# Patient Record
Sex: FEMALE | Race: WHITE | URBAN - METROPOLITAN AREA
[De-identification: names, ages, dates, MRNs, and addresses within clinical notes are randomized per-mention and may not be internally consistent; named-entity substitution may affect disease eponyms.]

---

## 2018-03-01 ENCOUNTER — HOSPITAL ENCOUNTER (EMERGENCY)
Facility: CLINIC | Age: 63
Discharge: HOME | End: 2018-03-01
Attending: EMERGENCY MEDICINE | Admitting: EMERGENCY MEDICINE
Payer: COMMERCIAL

## 2018-03-01 ENCOUNTER — APPOINTMENT (OUTPATIENT)
Dept: GENERAL RADIOLOGY | Facility: CLINIC | Age: 63
End: 2018-03-01
Attending: EMERGENCY MEDICINE
Payer: COMMERCIAL

## 2018-03-01 VITALS
HEART RATE: 72 BPM | OXYGEN SATURATION: 98 % | TEMPERATURE: 97 F | DIASTOLIC BLOOD PRESSURE: 90 MMHG | SYSTOLIC BLOOD PRESSURE: 132 MMHG | RESPIRATION RATE: 20 BRPM | WEIGHT: 210 LBS | BODY MASS INDEX: 34.95 KG/M2

## 2018-03-01 DIAGNOSIS — S92.514A CLOSED NONDISPLACED FRACTURE OF PROXIMAL PHALANX OF LESSER TOE OF RIGHT FOOT, INITIAL ENCOUNTER: ICD-10-CM

## 2018-03-01 PROCEDURE — 28470 CLTX METATARSAL FX WO MNP EA: CPT | Mod: RT

## 2018-03-01 PROCEDURE — 99284 EMERGENCY DEPT VISIT MOD MDM: CPT | Mod: 25

## 2018-03-01 PROCEDURE — 73660 X-RAY EXAM OF TOE(S): CPT | Mod: RT

## 2018-03-01 PROCEDURE — 25000132 ZZH RX MED GY IP 250 OP 250 PS 637: Performed by: EMERGENCY MEDICINE

## 2018-03-01 RX ORDER — ACETAMINOPHEN 500 MG
1000 TABLET ORAL EVERY 4 HOURS PRN
Status: DISCONTINUED | OUTPATIENT
Start: 2018-03-01 | End: 2018-03-01 | Stop reason: HOSPADM

## 2018-03-01 RX ADMIN — ACETAMINOPHEN 1000 MG: 500 TABLET, FILM COATED ORAL at 06:20

## 2018-03-01 ASSESSMENT — ENCOUNTER SYMPTOMS
JOINT SWELLING: 1
ARTHRALGIAS: 1

## 2018-03-01 NOTE — DISCHARGE INSTRUCTIONS
Closed Toe Fracture  Your toe is broken (fractured). This causes local pain, swelling, and sometimes bruising. This injury usually takes about 4 to 6 weeks to heal, but can sometimes take longer. Toe injuries are often treated by taping the injured toe to the next one (buddy taping). This protects the injured toe and holds it in position.     If the toenail has been severely injured, it may fall off in 1 to 2 weeks. It takes up to 12 months for a new toenail to grow back.  Home care  Follow these guidelines when caring for yourself at home:    You may be given a cast shoe to wear to keep your toe from moving. If not, you can use a sandal or any shoe that doesn t put pressure on the injured toe until the swelling and pain go away. If using a sandal, be careful not to strike your foot against anything. Another injury could make the fracture worse. If you were given crutches, don t put full weight on the injured foot until you can do so without pain, or as directed by your healthcare provider.    Keep your foot elevated to reduce pain and swelling. When sleeping, put a pillow under the injured leg. When sitting, support the injured leg so it is above your waist. This is very important during the first 2 days (48 hours).    Put an ice pack on the injured area. Do this for 20 minutes every 1 to 2 hours the first day for pain relief. You can make an ice pack by wrapping a plastic bag of ice cubes in a thin towel. As the ice melts, be careful that any cloth or paper tape doesn t get wet. Continue using the ice pack 3 to 4 times a day for the next 2 days. Then use the ice pack as needed to ease pain and swelling.    If buddy tape was used and it becomes wet or dirty, change it. You may replace it with paper, plastic, or cloth tape. Cloth tape and paper tapes must be kept dry.    You may use acetaminophen or ibuprofen to control pain, unless another pain medicine was prescribed. If you have chronic liver or kidney disease,  talk with your healthcare provider before using these medicines. Also talk with your provider if you ve had a stomach ulcer or gastrointestinal bleeding.    You may return to sports or physical education activities after 4 weeks when you can run without pain, or as directed by your healthcare provider.  Follow-up care  Follow up with your healthcare provider in 1 week, or as advised. This is to make sure the bone is healing the way it should.  X-rays may be taken. You will be told of any new findings that may affect your care.  When to seek medical advice  Call your healthcare provider right away if any of these occur:    Pain or swelling gets worse    The cast/splint cracks    The cast and padding get wet and stays wet more than 24 hours    Bad odor from the cast/splint or wound fluid stains the cast    Tightness or pressure under the cast/splint gets worse    Toe becomes cold, blue, numb, or tingly    You can t move the toe    Signs of infection: fever, redness, warmth, swelling, or drainage from the wound or cast    Fever of 100.4 F (38 C) or higher, or as directed by your healthcare provider  Date Last Reviewed: 2/1/2017 2000-2017 [employer]. [address] [place] [address]. All rights reserved. This information is not intended as a substitute for professional medical care. Always follow your healthcare professional's instructions.

## 2018-03-01 NOTE — ED PROVIDER NOTES
History     Chief Complaint:  Toe Injury    The history is provided by the patient.      Jessie Mccollum is a 62 year old female who presents with a toe injury. Patient stubbed her right little toe on the bath tub last night. 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 Currently rates pain at 9/10 in severity. She denies numbness in the toe, other injury or other complaint.     Allergies:  Perfume  Sulfa Drugs     Medications:    Robaxin  Xopenex neb  Benzonatate   Zyrtec    Past Medical History:    Allergy  Asthma     Past Surgical History:    Back surgery  Biopsy   Cholecystectomy   Gyn surgery   Orthopedic surgery   Soft tissue surgery     Family History:    History reviewed. No pertinent family history.      Social History:  Presents alone   Occupation:   Tobacco use: Never  Alcohol use: Denies   PCP: [facility]    Marital Status:       Review of Systems   Musculoskeletal: Positive for arthralgias and joint swelling.   All other systems reviewed and are negative.    Physical Exam     Patient Vitals for the past 24 hrs:   BP Temp Temp src Heart Rate Resp SpO2 Weight   03/01/18 0538 (!) 138/115 97  F (36.1  C) Temporal 76 18 97 % 95.3 kg (210 lb)      Physical Exam  Constitutional:  Appears well-developed and well-nourished. Alert. Conversant. Non toxic.       HENT:   Head: Atraumatic.   Nose: Nose unremarkable   Mouth/Throat: Oropharynx is clear and moist.   Eyes: Conjunctivae normal. EOM are grossly normal. Pupils are equal, round, and reactive to light. No scleral icterus.   Neck: Normal range of motion. No tracheal deviation present.   Cardiovascular: Normal rate, regular rhythm. Symmetric DP artery pulses.  Pulmonary/Chest: Effort normal. No stridor. No respiratory distress.  Musculoskeletal: Unremarkable except for right little toe. No other  abnormality noted.   Swelling, and ecchymosis of the little toe of the right foot.  No crepitus.  No rotational deformity.  Wiggling and range of motion are limited by pain.  She has intact digital nerve sensory function in that toe.  No tenderness more proximally in the metatarsal, or in the midfoot, hindfoot, or ankle.  Other toes are normal  Neurological: Alert and oriented to person, place, and time. Normal strength. CN II-VII intact. No sensory deficit. GCS eye subscore is 4. GCS verbal subscore is 5. GCS motor subscore is 6. Normal coordination . Intact distal sensory and motor function.  Skin: Skin is warm and dry. No rash noted. No pallor. Normal capillary refill.  Psychiatric:  normal mood and affect.      Emergency Department Course   Imaging:  Radiographic findings were communicated with the patient who voiced understanding of the findings.    XR Toe right, 2 views:  IMPRESSION: Minimally displaced fracture involving the mid to distal  aspect of the proximal phalanx of the fifth toe. No other fractures.  Slight degenerative changes at the first MTP joint.    Imaging independently reviewed and agree with radiologist interpretation.       Interventions:  0620: Tylenol 1000 mg PO      Emergency Department Course:  Past medical records, nursing notes, and vitals reviewed.  0600: I performed an exam of the patient and obtained history, as documented above.   Above interventions provided.  The patient was sent for a XR while in the emergency department, findings above.   0630: I rechecked the patient. Findings and plan explained to the Patient. Patient's toes rickie taped and she is placed in walking shoe.   Patient discharged home with instructions regarding supportive care, medications, and reasons to return. The importance of close follow-up was reviewed.      Impression & Plan    Medical Decision Making:  Jessie Mccollum is a 62 year old female who presents with toe pain after trauma.  XR reveals fx  as noted above.  Sensation intact, cap refill intact, no need for reduction as bones are in adequate alignment. No rotational deformity. No tarsal-metatarsal pain or evidence of fx.  No significant edema or erythema.  Analgesia was provided and toe was rickie-taped and walking shoe was also given.  Instructions to f/u with PMD in 1 week.  Elevation and ice x 48 hours.    Diagnosis:    ICD-10-CM    1. Closed nondisplaced fracture of proximal phalanx of lesser toe of right foot, initial encounter S92.514A     little toe, right foot       Disposition:  Discharged to home with plan as outlined.    Discharge Medications:  New Prescriptions    No medications on file         I, Micah Sellers am serving as a scribe at 6:07 AM on 3/1/2018 to document services personally performed by Ellis Morley MD based on my observations and the provider's statements to me.    3/1/2018   [facility] EMERGENCY DEPARTMENT       Ellis Morley MD  03/02/18 0515

## 2018-03-01 NOTE — ED AVS SNAPSHOT
[facility] Emergency Department    [address]    [facility] [address]    Phone:  [phone]    Fax:  [phone]                                       Jessie Mccollum   MRN: [id number]    Department:  [facility] Emergency Department   Date of Visit:  3/1/2018           Patient Information     Date Of Birth          1955        Your diagnoses for this visit were:     Closed nondisplaced fracture of proximal phalanx of lesser toe of right foot, initial encounter little toe, right foot       You were seen by Ellis Morley MD.      Follow-up Information     Follow up with [facility] In 7 days.    Why:  As needed    Contact information:    [address], [facility] [address]  [facility] [address]  [phone]          Discharge Instructions         Closed Toe Fracture  Your toe is broken (fractured). This causes local pain, swelling, and sometimes bruising. This injury usually takes about 4 to 6 weeks to heal, but can sometimes take longer. Toe injuries are often treated by taping the injured toe to the next one (buddy taping). This protects the injured toe and holds it in position.     If the toenail has been severely injured, it may fall off in 1 to 2 weeks. It takes up to 12 months for a new toenail to grow back.  Home care  Follow these guidelines when caring for yourself at home:    You may be given a cast shoe to wear to keep your toe from moving. If not, you can use a sandal or any shoe that doesn t put pressure on the injured toe until the swelling and pain go away. If using a sandal, be careful not to strike your foot against anything. Another injury could make the fracture worse. If you were given crutches, don t put full weight on the injured foot until you can do so without pain, or as directed by your healthcare provider.    Keep your foot elevated to reduce pain and swelling. When sleeping, put a pillow under the injured  leg. When sitting, support the injured leg so it is above your waist. This is very important during the first 2 days (48 hours).    Put an ice pack on the injured area. Do this for 20 minutes every 1 to 2 hours the first day for pain relief. You can make an ice pack by wrapping a plastic bag of ice cubes in a thin towel. As the ice melts, be careful that any cloth or paper tape doesn t get wet. Continue using the ice pack 3 to 4 times a day for the next 2 days. Then use the ice pack as needed to ease pain and swelling.    If buddy tape was used and it becomes wet or dirty, change it. You may replace it with paper, plastic, or cloth tape. Cloth tape and paper tapes must be kept dry.    You may use acetaminophen or ibuprofen to control pain, unless another pain medicine was prescribed. If you have chronic liver or kidney disease, talk with your healthcare provider before using these medicines. Also talk with your provider if you ve had a stomach ulcer or gastrointestinal bleeding.    You may return to sports or physical education activities after 4 weeks when you can run without pain, or as directed by your healthcare provider.  Follow-up care  Follow up with your healthcare provider in 1 week, or as advised. This is to make sure the bone is healing the way it should.  X-rays may be taken. You will be told of any new findings that may affect your care.  When to seek medical advice  Call your healthcare provider right away if any of these occur:    Pain or swelling gets worse    The cast/splint cracks    The cast and padding get wet and stays wet more than 24 hours    Bad odor from the cast/splint or wound fluid stains the cast    Tightness or pressure under the cast/splint gets worse    Toe becomes cold, blue, numb, or tingly    You can t move the toe    Signs of infection: fever, redness, warmth, swelling, or drainage from the wound or cast    Fever of 100.4 F (38 C) or higher, or as directed by your healthcare  provider  Date Last Reviewed: 2/1/2017 2000-2017 [employer]. [address] [place] [address], [place] [address]. All rights reserved. This information is not intended as a substitute for professional medical care. Always follow your healthcare professional's instructions.          24 Hour Appointment Hotline       To make an appointment at any [facility], call [phone] ([phone]). If you don't have a family doctor or clinic, we will help you find one. [facility] are conveniently located to serve the needs of you and your family.             Review of your medicines      Our records show that you are taking the medicines listed below. If these are incorrect, please call your family doctor or clinic.        Dose / Directions Last dose taken    BENADRYL PO        Refills:  0        BENZONATATE PO        Refills:  0        betamethasone dipropionate 0.05 % cream   Commonly known as:  DIPROSONE        Apply topically 2 times daily   Refills:  0        cetirizine 10 MG tablet   Commonly known as:  zyrTEC   Dose:  10 mg        Take 10 mg by mouth daily   Refills:  0        EPINEPHrine 0.15 MG/0.15ML Cristy        Inject  as directed.   Refills:  0        * levalbuterol 0.31 MG/3ML neb solution   Commonly known as:  XOPENEX   Dose:  1 ampule   Quantity:  225 mL        Take 3 mLs by nebulization every 6 hours as needed for shortness of breath / dyspnea.   Refills:  0        * levalbuterol 0.31 MG/3ML neb solution   Commonly known as:  XOPENEX   Dose:  1 ampule        Take 1 ampule by nebulization every 4 hours as needed.   Refills:  0        miconazole 2 % Aerp powder   Commonly known as:  MICATIN        Apply topically 2 times daily   Refills:  0        ROBAXIN PO        Refills:  0        * Notice:  This list has 2 medication(s) that are the same as other medications prescribed for you. Read the directions carefully, and ask your doctor or other care provider to review them with you.             Procedures and tests performed during your visit     XR Toe Right G/E 2 Views      Orders Needing Specimen Collection     None      Pending Results     No orders found from 2/27/2018 to 3/2/2018.            Pending Culture Results     No orders found from 2/27/2018 to 3/2/2018.            Pending Results Instructions     If you had any lab results that were not finalized at the time of your Discharge, you can call the ED Lab Result RN at [phone]. You will be contacted by this team for any positive Lab results or changes in treatment. The nurses are available 7 days a week from 10A to 6:30P.  You can leave a message 24 hours per day and they will return your call.        Test Results From Your Hospital Stay        3/1/2018  6:42 AM      Narrative     XR TOE RIGHT G/E 2 VIEWS   3/1/2018 6:22 AM     INDICATION: Right fifth toe pain after trauma.    COMPARISON: None.        Impression     IMPRESSION: Minimally displaced fracture involving the mid to distal  aspect of the proximal phalanx of the fifth toe. No other fractures.  Slight degenerative changes at the first MTP joint.    NIKIA LEROY MD                Clinical Quality Measure: Blood Pressure Screening     Your blood pressure was checked while you were in the emergency department today. The last reading we obtained was  BP: (!) 138/115 . Please read the guidelines below about what these numbers mean and what you should do about them.  If your systolic blood pressure (the top number) is less than 120 and your diastolic blood pressure (the bottom number) is less than 80, then your blood pressure is normal. There is nothing more that you need to do about it.  If your systolic blood pressure (the top number) is 120-139 or your diastolic blood pressure (the bottom number) is 80-89, your blood pressure may be higher than it should be. You should have your blood pressure rechecked within a year by a primary care provider.  If your systolic blood pressure  "(the top number) is 140 or greater or your diastolic blood pressure (the bottom number) is 90 or greater, you may have high blood pressure. High blood pressure is treatable, but if left untreated over time it can put you at risk for heart attack, stroke, or kidney failure. You should have your blood pressure rechecked by a primary care provider within the next 4 weeks.  If your provider in the emergency department today gave you specific instructions to follow-up with your doctor or provider even sooner than that, you should follow that instruction and not wait for up to 4 weeks for your follow-up visit.        Thank you for choosing [facility]       Thank you for choosing [facility] for your care. Our goal is always to provide you with excellent care. Hearing back from our patients is one way we can continue to improve our services. Please take a few minutes to complete the written survey that you may receive in the mail after you visit with us. Thank you!        [employer] Information     [employer] lets you send messages to your doctor, view your test results, renew your prescriptions, schedule appointments and more. To sign up, go to [email or web address] . Click on \"Log in\" on the left side of the screen, which will take you to the Welcome page. Then click on \"Sign up Now\" on the right side of the page.     You will be asked to enter the access code listed below, as well as some personal information. Please follow the directions to create your username and password.     Your access code is: [email or web address]  Expires: 2018  6:48 AM     Your access code will  in 90 days. If you need help or a new code, please call your [facility] clinic or [phone].        Care EveryWhere ID     This is your Care EveryWhere ID. This could be used by other organizations to access your [facility] medical records  [id number]        Equal Access to Services     CANDICE BRITO : yvonne Sainz qaybta " venus taylor ah. So [facility] [phone].    ATENCIÓN: Si habla español, tiene a caban disposición servicios gratuitos de asistencia lingüística. Llame al [phone].    We comply with applicable federal civil rights laws and [place] laws. We do not discriminate on the basis of race, color, national origin, age, disability, sex, sexual orientation, or gender identity.            After Visit Summary       This is your record. Keep this with you and show to your community pharmacist(s) and doctor(s) at your next visit.

## 2018-03-01 NOTE — ED NOTES
Alert and oriented x 3 airway,breathing and circulation intact, rt little toe injury from stubbing toe on bath tub last pm

## 2018-03-01 NOTE — ED AVS SNAPSHOT
[facility] Emergency Department    [address]    [facility] [address]    Phone:  [phone]    Fax:  [phone]                                       Jessie Mccollum   MRN: [id number]    Department:  [facility] Emergency Department   Date of Visit:  3/1/2018           After Visit Summary Signature Page     I have received my discharge instructions, and my questions have been answered. I have discussed any challenges I see with this plan with the nurse or doctor.    ..........................................................................................................................................  Patient/Patient Representative Signature      ..........................................................................................................................................  Patient Representative Print Name and Relationship to Patient    ..................................................               ................................................  Date                                            Time    ..........................................................................................................................................  Reviewed by Signature/Title    ...................................................              ..............................................  Date                                                            Time

## 2022-10-25 ENCOUNTER — OFFICE VISIT (OUTPATIENT)
Dept: FAMILY MEDICINE CLINIC | Facility: CLINIC | Age: 67
End: 2022-10-25

## 2022-10-25 VITALS
WEIGHT: 206.6 LBS | OXYGEN SATURATION: 96 % | BODY MASS INDEX: 34.42 KG/M2 | HEART RATE: 78 BPM | HEIGHT: 65 IN | TEMPERATURE: 97.7 F | DIASTOLIC BLOOD PRESSURE: 78 MMHG | SYSTOLIC BLOOD PRESSURE: 126 MMHG

## 2022-10-25 DIAGNOSIS — L02.91 ABSCESS: ICD-10-CM

## 2022-10-25 DIAGNOSIS — R53.83 FATIGUE, UNSPECIFIED TYPE: ICD-10-CM

## 2022-10-25 DIAGNOSIS — J45.40 MODERATE PERSISTENT ASTHMA WITHOUT COMPLICATION: ICD-10-CM

## 2022-10-25 DIAGNOSIS — Z51.81 MEDICATION MONITORING ENCOUNTER: ICD-10-CM

## 2022-10-25 DIAGNOSIS — C34.90 NON-SMALL CELL LUNG CANCER, UNSPECIFIED LATERALITY: Primary | ICD-10-CM

## 2022-10-25 PROCEDURE — 83735 ASSAY OF MAGNESIUM: CPT | Performed by: INTERNAL MEDICINE

## 2022-10-25 PROCEDURE — 99204 OFFICE O/P NEW MOD 45 MIN: CPT | Performed by: INTERNAL MEDICINE

## 2022-10-25 PROCEDURE — 93005 ELECTROCARDIOGRAM TRACING: CPT | Performed by: INTERNAL MEDICINE

## 2022-10-25 PROCEDURE — 80053 COMPREHEN METABOLIC PANEL: CPT | Performed by: INTERNAL MEDICINE

## 2022-10-25 PROCEDURE — 84443 ASSAY THYROID STIM HORMONE: CPT | Performed by: INTERNAL MEDICINE

## 2022-10-25 PROCEDURE — 85027 COMPLETE CBC AUTOMATED: CPT | Performed by: INTERNAL MEDICINE

## 2022-10-25 PROCEDURE — 36415 COLL VENOUS BLD VENIPUNCTURE: CPT | Performed by: INTERNAL MEDICINE

## 2022-10-25 RX ORDER — PHENOL 1.4 %
AEROSOL, SPRAY (ML) MUCOUS MEMBRANE
COMMUNITY
End: 2023-02-14

## 2022-10-25 RX ORDER — BENZONATATE 100 MG/1
100 CAPSULE ORAL 3 TIMES DAILY PRN
COMMUNITY

## 2022-10-25 RX ORDER — OXYCODONE HYDROCHLORIDE 5 MG/1
5 CAPSULE ORAL EVERY 4 HOURS PRN
COMMUNITY

## 2022-10-25 RX ORDER — PSEUDOEPHEDRINE HCL 120 MG/1
120 TABLET, FILM COATED, EXTENDED RELEASE ORAL DAILY PRN
COMMUNITY

## 2022-10-25 RX ORDER — SODIUM CHLORIDE/SODIUM BICARB
PACKET (EA) NASAL
COMMUNITY

## 2022-10-25 RX ORDER — SENNOSIDES 8.6 MG
1300 CAPSULE ORAL EVERY 8 HOURS PRN
COMMUNITY
End: 2022-12-19

## 2022-10-25 RX ORDER — ONDANSETRON 4 MG/1
4 TABLET, FILM COATED ORAL EVERY 8 HOURS PRN
COMMUNITY
End: 2022-11-22 | Stop reason: SDUPTHER

## 2022-10-25 RX ORDER — CEFADROXIL 500 MG/1
500 CAPSULE ORAL 2 TIMES DAILY
COMMUNITY
End: 2022-11-08

## 2022-10-25 RX ORDER — SODIUM CHLORIDE FOR INHALATION 3 %
4 VIAL, NEBULIZER (ML) INHALATION AS NEEDED
COMMUNITY

## 2022-10-25 RX ORDER — OXYCODONE HCL 10 MG/1
10 TABLET, FILM COATED, EXTENDED RELEASE ORAL EVERY 12 HOURS
COMMUNITY

## 2022-10-25 RX ORDER — POLYETHYLENE GLYCOL 3350 17 G/17G
17 POWDER, FOR SOLUTION ORAL DAILY
COMMUNITY

## 2022-10-25 RX ORDER — SALIVA STIMULANT COMB. NO.7
GEL (GRAM) MUCOUS MEMBRANE AS NEEDED
COMMUNITY

## 2022-10-25 RX ORDER — DOXYCYCLINE HYCLATE 100 MG/1
100 CAPSULE ORAL 2 TIMES DAILY
Qty: 20 CAPSULE | Refills: 0 | Status: SHIPPED | OUTPATIENT
Start: 2022-10-25 | End: 2022-11-08

## 2022-10-25 RX ORDER — GUAIFENESIN AND DEXTROMETHORPHAN HYDROBROMIDE 1200; 60 MG/1; MG/1
TABLET, EXTENDED RELEASE ORAL AS NEEDED
COMMUNITY

## 2022-10-25 RX ORDER — ALBUTEROL SULFATE 2.5 MG/3ML
2.5 SOLUTION RESPIRATORY (INHALATION) 3 TIMES WEEKLY
COMMUNITY

## 2022-10-25 RX ORDER — CHLORPHENIRAMINE MALEATE 4 MG/1
4 TABLET ORAL NIGHTLY
COMMUNITY
End: 2023-01-19

## 2022-10-25 RX ORDER — AMOXICILLIN 250 MG
1 CAPSULE ORAL 2 TIMES DAILY
COMMUNITY

## 2022-10-25 NOTE — PROGRESS NOTES
Venipuncture Blood Specimen Collection  Venipuncture performed in RIGHT HAND by Viry Maldonado RN with good hemostasis. Patient tolerated the procedure well without complications.   10/25/22   Viry Maldonado RN

## 2022-10-25 NOTE — PROGRESS NOTES
Patient Name: Savi Villalpando Today's Date: 10/26/2022   Patient MRN / CSN: [id number] / [id number] Date of Encounter: 10/25/2022   Patient Age / : 66 y.o. / 1955 Encounter Provider: Camila Soto DO   Referring Physician: No ref. provider found          Savi is a 66 y.o. female who is being seen today for Establish Care      History of Present Illness  Savi presents today to \[facility]\"" care. She has recently moved to the area from [place]. She was diagnosed with Stage IV Non-Small Cell Lung Ca in 2022. She is following with her oncologist at the [facility] and is part of a research study there. She had 6 days of radiation then started Retevmo on 22 with great results so far. She is also seeing Palliative Care at [facility] and is in a research study with Palliative Care there but she is not sure she is going to continue that study. She needs to have labs done locally and sent to her oncologist at [place].    Savi has a history of asthma for many years.  She reports shortness of air has drastically improved since she started Retevmo.  She reports doing well with albuterol nebulizers as needed.    Allergies include:Metronidazole, Other, Shellfish-derived products, Sulfa antibiotics, and Sertraline  Current Outpatient Medications   Medication Sig Dispense Refill   • acetaminophen (TYLENOL) 650 MG 8 hr tablet Take 2 tablets by mouth Every 8 (Eight) Hours As Needed for Mild Pain.     • albuterol (PROVENTIL) (2.5 MG/3ML) 0.083% nebulizer solution Take 2.5 mg by nebulization Every 4 (Four) Hours As Needed for Wheezing.     • benzonatate (TESSALON) 100 MG capsule Take 1 capsule by mouth 3 (Three) Times a Day As Needed for Cough.     • chlorpheniramine (CHLOR-TRIMETON) 4 MG tablet Take 1 tablet by mouth Every Night.     • Dextromethorphan-guaiFENesin (Mucinex DM Maximum Strength)  MG tablet sustained-release 12 hour Take  by mouth As Needed.     • dry mouth gel (BIOTENE ORALBALANCE) gel  Apply  to the mouth or throat As Needed for Dry Mouth.     • Hypertonic Nasal Wash (Sinus Rinse) pack into the nostril(s) as directed by provider.     • LEVALBUTEROL TARTRATE HFA IN Inhale.     • Melatonin 10 MG tablet Take  by mouth.     • mometasone-formoterol (DULERA 200) 200-5 MCG/ACT inhaler Inhale 2 puffs 2 (Two) Times a Day.     • ondansetron (ZOFRAN) 4 MG tablet Take 1 tablet by mouth Every 8 (Eight) Hours As Needed for Nausea or Vomiting.     • oxyCODONE (OXY-IR) 5 MG capsule Take 1 capsule by mouth Every 4 (Four) Hours As Needed for Moderate Pain.     • oxyCODONE (oxyCONTIN) 10 MG 12 hr tablet Take 1 tablet by mouth Every 12 (Twelve) Hours.     • polyethylene glycol (MiraLax Mix-In [place]) 17 g packet Take 17 g by mouth Daily.     • pseudoephedrine (SUDAFED) 120 MG 12 hr tablet Take 1 tablet by mouth Daily As Needed for Congestion.     • Selpercatinib (Retevmo) 80 MG capsule Take 2 capsules by mouth 2 (Two) Times a Day. 2 tablets, twice per day     • sennosides-docusate (senna-docusate sodium) 8.6-50 MG per tablet Take 1 tablet by mouth 2 (Two) Times a Day.     • sodium chloride 3 % nebulizer solution Take 4 mL by nebulization As Needed for Cough.     • vitamin E 10909 units external oil Apply  topically to the appropriate area as directed Daily.     • Wound Cleansers (WOUND WASH EX) Apply  topically.     • cefadroxil (DURICEF) 500 MG capsule Take 1 capsule by mouth 2 (Two) Times a Day.     • doxycycline (VIBRAMYCIN) 100 MG capsule Take 1 capsule by mouth 2 (Two) Times a Day. 20 capsule 0     No current facility-administered medications for this visit.     Past Medical History:   Diagnosis Date   • Allergies    • Lung cancer (HCC)     stage 4   • Rib fracture      History reviewed. No pertinent family history.  Past Surgical History:   Procedure Laterality Date   • BILATERAL BREAST REDUCTION      3 lb removal   • CERVICAL FUSION      C567 partial 8   • CHOLECYSTECTOMY     • HAND SURGERY      x3   •  "HYSTERECTOMY Bilateral     total   • LYMPH NODE DISSECTION      back of neck   • TRIGGER FINGER RELEASE       Social History     Substance and Sexual Activity   Alcohol Use Never     Social History     Tobacco Use   Smoking Status Never   Smokeless Tobacco Never     Social History     Substance and Sexual Activity   Drug Use Never     Review of Systems   Constitutional: Negative for fever.   Respiratory:        SOA much improved with current regimen.    Cardiovascular: Negative for chest pain.   Gastrointestinal: Positive for diarrhea.        Patient has noted diarrhea as a side effect of Retevmo but she feels that she is tolerating this well.   Skin: Positive for wound.        The mailPatient had an abscess develop on her right posterior thigh recently.  She had an ultrasound done of this while she was at the [facility] recently.  She was started on a cephalosporin which she completed.  She reports this has helped the skin lesion but it is still red and somewhat tender.  The skin lesion is opened up and is no longer draining.  She was told clinic that she would need to have a follow-up ultrasound done of this lesion.        Depression Assessment Review:  PHQ-9 Total Score: 0  Vital Signs & Measurements Taken This Encounter  /78 (BP Location: Left arm, Patient Position: Sitting, Cuff Size: Large Adult)   Pulse 78   Temp 97.7 °F (36.5 °C) (Temporal)   Ht 165.1 cm (65\")   Wt 93.7 kg (206 lb 9.6 oz)   SpO2 96%   BMI 34.38 kg/m²    SpO2 Percentage    10/25/22 1121   SpO2: 96%          Physical Exam  Vitals reviewed.   Constitutional:       General: She is not in acute distress.  HENT:      Head: Normocephalic and atraumatic.   Eyes:      General: No scleral icterus.     Extraocular Movements: Extraocular movements intact.      Conjunctiva/sclera: Conjunctivae normal.      Pupils: Pupils are equal, round, and reactive to light.   Cardiovascular:      Rate and Rhythm: Normal rate and regular rhythm. "   Pulmonary:      Effort: Pulmonary effort is normal. No respiratory distress.      Breath sounds: Normal breath sounds. No wheezing or rhonchi.   Abdominal:      Palpations: Abdomen is soft.      Tenderness: There is no abdominal tenderness. There is no guarding or rebound.   Musculoskeletal:         General: No swelling.      Cervical back: Neck supple. No tenderness.   Lymphadenopathy:      Cervical: No cervical adenopathy.   Skin:     General: Skin is warm and dry.      Coloration: Skin is not jaundiced.      Comments: Annular abscess with open center on the right posterior thigh.  There is no drainage or odor from this wound.  There is surrounding erythema and warmth.   Neurological:      Mental Status: She is alert.   Psychiatric:         Mood and Affect: Mood normal.         Behavior: Behavior normal.              Assessment & Plan  There is no problem list on file for this patient.      ICD-10-CM ICD-9-CM   1. Non-small cell lung cancer, unspecified laterality (HCC)  C34.90 162.9   2. Moderate persistent asthma without complication  J45.40 493.90   3. Abscess  L02.91 682.9   4. Fatigue, unspecified type  R53.83 780.79   5. Medication monitoring encounter  Z51.81 V58.83     Orders Placed This Encounter   Procedures   • US soft tissue     Standing Status:   Future     Standing Expiration Date:   10/25/2023     Scheduling Instructions:      Schedule in 1-2 weeks     Order Specific Question:   Reason for Exam:     Answer:   Abscess     Order Specific Question:   Release to patient     Answer:   Routine Release   • Comprehensive Metabolic Panel     Order Specific Question:   Release to patient     Answer:   Routine Release   • TSH     Order Specific Question:   Release to patient     Answer:   Routine Release   • CBC (No Diff)     Order Specific Question:   Release to patient     Answer:   Routine Release   • Magnesium   • ECG 12 Lead     Order Specific Question:   Reason for Exam:     Answer:   medicine  monitoring       Meds Ordered During Visit:  New Medications Ordered This Visit   Medications   • doxycycline (VIBRAMYCIN) 100 MG capsule     Sig: Take 1 capsule by mouth 2 (Two) Times a Day.     Dispense:  20 capsule     Refill:  0     ECG in office today shows sinus rhythm, rate at 82 bpm, normal intervals, minimal voltage criteria met for LVH, without evidence of strain pattern.  This could be a normal variant for patient.  There were no acute ST findings.  There was no previous ECG available for comparison.    We will update labs today as above.  We will also request records from the [facility].  I encouraged patient to continue her specialty follow-ups as planned.  Also, I encouraged her to continue her current medicine regimen.  I discussed doxycycline therapy with patient today.  We will follow-up with an ultrasound of the soft tissue of the right thigh as requested.    Return in about 2 weeks (around 11/8/2022), or if symptoms worsen or fail to improve, for Please obtain records from [facility].          Referring Provider (if known): No ref. provider found      This document has been electronically signed by Camila Soto DO  October 26, 2022 10:13 EDT    Camila Soto DO, FACOI  [address] [place] [address]  [phone] (office)    Part of this note may be an electronic transcription/translation of spoken language to printed text using the Dragon Dictation System.

## 2022-10-26 LAB
ALBUMIN SERPL-MCNC: 4.1 G/DL (ref 3.5–5.2)
ALBUMIN/GLOB SERPL: 1.4 G/DL
ALP SERPL-CCNC: 133 U/L (ref 39–117)
ALT SERPL W P-5'-P-CCNC: 117 U/L (ref 1–33)
ANION GAP SERPL CALCULATED.3IONS-SCNC: 14.7 MMOL/L (ref 5–15)
AST SERPL-CCNC: 104 U/L (ref 1–32)
BILIRUB SERPL-MCNC: 0.7 MG/DL (ref 0–1.2)
BUN SERPL-MCNC: 8 MG/DL (ref 8–23)
BUN/CREAT SERPL: 9.5 (ref 7–25)
CALCIUM SPEC-SCNC: 9.7 MG/DL (ref 8.6–10.5)
CHLORIDE SERPL-SCNC: 103 MMOL/L (ref 98–107)
CO2 SERPL-SCNC: 20.3 MMOL/L (ref 22–29)
CREAT SERPL-MCNC: 0.84 MG/DL (ref 0.57–1)
DEPRECATED RDW RBC AUTO: 45.6 FL (ref 37–54)
EGFRCR SERPLBLD CKD-EPI 2021: 77.2 ML/MIN/1.73
ERYTHROCYTE [DISTWIDTH] IN BLOOD BY AUTOMATED COUNT: 15.2 % (ref 12.3–15.4)
GLOBULIN UR ELPH-MCNC: 3 GM/DL
GLUCOSE SERPL-MCNC: 82 MG/DL (ref 65–99)
HCT VFR BLD AUTO: 41.4 % (ref 34–46.6)
HGB BLD-MCNC: 13.2 G/DL (ref 12–15.9)
MAGNESIUM SERPL-MCNC: 2.4 MG/DL (ref 1.6–2.4)
MCH RBC QN AUTO: 26.4 PG (ref 26.6–33)
MCHC RBC AUTO-ENTMCNC: 31.9 G/DL (ref 31.5–35.7)
MCV RBC AUTO: 82.8 FL (ref 79–97)
PLATELET # BLD AUTO: 308 10*3/MM3 (ref 140–450)
PMV BLD AUTO: 9.6 FL (ref 6–12)
POTASSIUM SERPL-SCNC: 4 MMOL/L (ref 3.5–5.2)
PROT SERPL-MCNC: 7.1 G/DL (ref 6–8.5)
RBC # BLD AUTO: 5 10*6/MM3 (ref 3.77–5.28)
SODIUM SERPL-SCNC: 138 MMOL/L (ref 136–145)
TSH SERPL DL<=0.05 MIU/L-ACNC: 0.8 UIU/ML (ref 0.27–4.2)
WBC NRBC COR # BLD: 5.14 10*3/MM3 (ref 3.4–10.8)

## 2022-10-27 ENCOUNTER — PATIENT ROUNDING (BHMG ONLY) (OUTPATIENT)
Dept: FAMILY MEDICINE CLINIC | Facility: CLINIC | Age: 67
End: 2022-10-27

## 2022-10-31 ENCOUNTER — TELEPHONE (OUTPATIENT)
Dept: FAMILY MEDICINE CLINIC | Facility: CLINIC | Age: 67
End: 2022-10-31

## 2022-10-31 NOTE — TELEPHONE ENCOUNTER
Pt has requested to have her most recent labs sent to [facility] Oncology department, the patient sees Dr. Raheel Guillen.   She has a tele health visit tomorrow and needs these labs sent ASAP.

## 2022-11-02 ENCOUNTER — TELEPHONE (OUTPATIENT)
Dept: FAMILY MEDICINE CLINIC | Facility: CLINIC | Age: 67
End: 2022-11-02

## 2022-11-02 NOTE — TELEPHONE ENCOUNTER
Called pt and she states that this provider was suppose to fax an order for these labs. Please let me know when these are received. Once received, please call the patient to set her up for lab appointment

## 2022-11-02 NOTE — TELEPHONE ENCOUNTER
Caller: Savi Villalpando    Relationship: Self    Best call back number:[phone]      What orders are you requesting (i.e. lab or imaging): LAB TO HAVE LIVER  BLOOD DRAW    In what timeframe would the patient need to come in: ASAP  Where will you receive your lab/imaging services:OFFICE    Additional notes: PT STATED THAT HER ONCOLOGIST  REQUEST FOR PT TO HAVE LAB WORK DOWN BECAUSE LAST TIME LIVER NUMBERS WERE HIGH

## 2022-11-03 ENCOUNTER — CLINICAL SUPPORT (OUTPATIENT)
Dept: FAMILY MEDICINE CLINIC | Facility: CLINIC | Age: 67
End: 2022-11-03

## 2022-11-03 DIAGNOSIS — C34.92 MALIGNANT NEOPLASM OF LEFT LUNG, UNSPECIFIED PART OF LUNG: Primary | ICD-10-CM

## 2022-11-03 LAB
ALBUMIN SERPL-MCNC: 4.3 G/DL (ref 3.5–5.2)
ALBUMIN/GLOB SERPL: 2 G/DL
ALP SERPL-CCNC: 190 U/L (ref 39–117)
ALT SERPL W P-5'-P-CCNC: 384 U/L (ref 1–33)
ANION GAP SERPL CALCULATED.3IONS-SCNC: 10 MMOL/L (ref 5–15)
AST SERPL-CCNC: 262 U/L (ref 1–32)
BASOPHILS # BLD AUTO: 0.07 10*3/MM3 (ref 0–0.2)
BASOPHILS NFR BLD AUTO: 1.7 % (ref 0–1.5)
BILIRUB CONJ SERPL-MCNC: <0.2 MG/DL (ref 0–0.3)
BILIRUB SERPL-MCNC: 0.7 MG/DL (ref 0–1.2)
BUN SERPL-MCNC: 15 MG/DL (ref 8–23)
BUN/CREAT SERPL: 18.3 (ref 7–25)
CALCIUM SPEC-SCNC: 10.1 MG/DL (ref 8.6–10.5)
CHLORIDE SERPL-SCNC: 102 MMOL/L (ref 98–107)
CO2 SERPL-SCNC: 29 MMOL/L (ref 22–29)
CREAT SERPL-MCNC: 0.82 MG/DL (ref 0.57–1)
DEPRECATED RDW RBC AUTO: 44.5 FL (ref 37–54)
EGFRCR SERPLBLD CKD-EPI 2021: 79 ML/MIN/1.73
EOSINOPHIL # BLD AUTO: 0.3 10*3/MM3 (ref 0–0.4)
EOSINOPHIL NFR BLD AUTO: 7.5 % (ref 0.3–6.2)
ERYTHROCYTE [DISTWIDTH] IN BLOOD BY AUTOMATED COUNT: 15.5 % (ref 12.3–15.4)
GLOBULIN UR ELPH-MCNC: 2.1 GM/DL
GLUCOSE SERPL-MCNC: 87 MG/DL (ref 65–99)
HCT VFR BLD AUTO: 43.3 % (ref 34–46.6)
HGB BLD-MCNC: 14.1 G/DL (ref 12–15.9)
IMM GRANULOCYTES # BLD AUTO: 0.01 10*3/MM3 (ref 0–0.05)
IMM GRANULOCYTES NFR BLD AUTO: 0.2 % (ref 0–0.5)
LYMPHOCYTES # BLD AUTO: 1.12 10*3/MM3 (ref 0.7–3.1)
LYMPHOCYTES NFR BLD AUTO: 27.9 % (ref 19.6–45.3)
MCH RBC QN AUTO: 26.4 PG (ref 26.6–33)
MCHC RBC AUTO-ENTMCNC: 32.6 G/DL (ref 31.5–35.7)
MCV RBC AUTO: 81.1 FL (ref 79–97)
MONOCYTES # BLD AUTO: 0.41 10*3/MM3 (ref 0.1–0.9)
MONOCYTES NFR BLD AUTO: 10.2 % (ref 5–12)
NEUTROPHILS NFR BLD AUTO: 2.1 10*3/MM3 (ref 1.7–7)
NEUTROPHILS NFR BLD AUTO: 52.5 % (ref 42.7–76)
NRBC BLD AUTO-RTO: 0 /100 WBC (ref 0–0.2)
PLATELET # BLD AUTO: 242 10*3/MM3 (ref 140–450)
PMV BLD AUTO: 9.3 FL (ref 6–12)
POTASSIUM SERPL-SCNC: 4.2 MMOL/L (ref 3.5–5.2)
PROT SERPL-MCNC: 6.4 G/DL (ref 6–8.5)
RBC # BLD AUTO: 5.34 10*6/MM3 (ref 3.77–5.28)
SODIUM SERPL-SCNC: 141 MMOL/L (ref 136–145)
WBC NRBC COR # BLD: 4.01 10*3/MM3 (ref 3.4–10.8)

## 2022-11-03 PROCEDURE — 36415 COLL VENOUS BLD VENIPUNCTURE: CPT | Performed by: INTERNAL MEDICINE

## 2022-11-03 PROCEDURE — 82248 BILIRUBIN DIRECT: CPT | Performed by: INTERNAL MEDICINE

## 2022-11-03 PROCEDURE — 80053 COMPREHEN METABOLIC PANEL: CPT | Performed by: INTERNAL MEDICINE

## 2022-11-03 PROCEDURE — 85025 COMPLETE CBC W/AUTO DIFF WBC: CPT | Performed by: INTERNAL MEDICINE

## 2022-11-03 NOTE — PROGRESS NOTES
I will order the labs requested from [facility] physician and we can send results to them once available.   Answers for HPI/ROS submitted by the patient on 11/2/2022  Please describe your symptoms.: Blood draw.  Have you had these symptoms before?: Yes  How long have you been having these symptoms?: Greater than 2 weeks  Please list any medications you are currently taking for this condition.: Oncology patient  What is the primary reason for your visit?: Other

## 2022-11-03 NOTE — PROGRESS NOTES
Venipuncture Blood Specimen Collection  Venipuncture performed in LEFT ARM by Viry Maldonado RN with good hemostasis. Patient tolerated the procedure well without complications.   11/03/22   Viry Maldonado RN

## 2022-11-07 ENCOUNTER — HOSPITAL ENCOUNTER (EMERGENCY)
Facility: HOSPITAL | Age: 67
Discharge: HOME | End: 2022-11-08
Attending: STUDENT IN AN ORGANIZED HEALTH CARE EDUCATION/TRAINING PROGRAM | Admitting: STUDENT IN AN ORGANIZED HEALTH CARE EDUCATION/TRAINING PROGRAM

## 2022-11-07 ENCOUNTER — APPOINTMENT (OUTPATIENT)
Dept: CT IMAGING | Facility: HOSPITAL | Age: 67
End: 2022-11-07

## 2022-11-07 DIAGNOSIS — K57.92 ACUTE DIVERTICULITIS: ICD-10-CM

## 2022-11-07 DIAGNOSIS — T50.905A DRUG-INDUCED HEPATOTOXICITY: Primary | ICD-10-CM

## 2022-11-07 DIAGNOSIS — K71.6 DRUG-INDUCED HEPATOTOXICITY: Primary | ICD-10-CM

## 2022-11-07 LAB
ALBUMIN SERPL-MCNC: 4.07 G/DL (ref 3.5–5.2)
ALBUMIN/GLOB SERPL: 1.3 G/DL
ALP SERPL-CCNC: 182 U/L (ref 39–117)
ALT SERPL W P-5'-P-CCNC: 317 U/L (ref 1–33)
ANION GAP SERPL CALCULATED.3IONS-SCNC: 12.6 MMOL/L (ref 5–15)
APTT PPP: 33.1 SECONDS (ref 26.5–34.5)
AST SERPL-CCNC: 256 U/L (ref 1–32)
BACTERIA UR QL AUTO: ABNORMAL /HPF
BASOPHILS # BLD AUTO: 0.07 10*3/MM3 (ref 0–0.2)
BASOPHILS NFR BLD AUTO: 1 % (ref 0–1.5)
BILIRUB SERPL-MCNC: 0.8 MG/DL (ref 0–1.2)
BILIRUB UR QL STRIP: NEGATIVE
BUN SERPL-MCNC: 14 MG/DL (ref 8–23)
BUN/CREAT SERPL: 14.4 (ref 7–25)
CALCIUM SPEC-SCNC: 9.7 MG/DL (ref 8.6–10.5)
CHLORIDE SERPL-SCNC: 97 MMOL/L (ref 98–107)
CLARITY UR: CLEAR
CO2 SERPL-SCNC: 27.4 MMOL/L (ref 22–29)
COLOR UR: YELLOW
CREAT SERPL-MCNC: 0.97 MG/DL (ref 0.57–1)
CRP SERPL-MCNC: 8.31 MG/DL (ref 0–0.5)
DEPRECATED RDW RBC AUTO: 46.5 FL (ref 37–54)
EGFRCR SERPLBLD CKD-EPI 2021: 64.6 ML/MIN/1.73
EOSINOPHIL # BLD AUTO: 0.24 10*3/MM3 (ref 0–0.4)
EOSINOPHIL NFR BLD AUTO: 3.5 % (ref 0.3–6.2)
ERYTHROCYTE [DISTWIDTH] IN BLOOD BY AUTOMATED COUNT: 15.4 % (ref 12.3–15.4)
FLUAV RNA RESP QL NAA+PROBE: NOT DETECTED
FLUBV RNA RESP QL NAA+PROBE: NOT DETECTED
GLOBULIN UR ELPH-MCNC: 3 GM/DL
GLUCOSE SERPL-MCNC: 103 MG/DL (ref 65–99)
GLUCOSE UR STRIP-MCNC: NEGATIVE MG/DL
HAV IGM SERPL QL IA: NORMAL
HBV CORE IGM SERPL QL IA: NORMAL
HBV SURFACE AG SERPL QL IA: NORMAL
HCT VFR BLD AUTO: 45.1 % (ref 34–46.6)
HCV AB SER DONR QL: NORMAL
HGB BLD-MCNC: 14.3 G/DL (ref 12–15.9)
HGB UR QL STRIP.AUTO: NEGATIVE
HOLD SPECIMEN: NORMAL
HOLD SPECIMEN: NORMAL
HYALINE CASTS UR QL AUTO: ABNORMAL /LPF
IMM GRANULOCYTES # BLD AUTO: 0.02 10*3/MM3 (ref 0–0.05)
IMM GRANULOCYTES NFR BLD AUTO: 0.3 % (ref 0–0.5)
INR PPP: 1.01 (ref 0.9–1.1)
KETONES UR QL STRIP: NEGATIVE
LDH SERPL-CCNC: 313 U/L (ref 135–214)
LEUKOCYTE ESTERASE UR QL STRIP.AUTO: ABNORMAL
LIPASE SERPL-CCNC: 19 U/L (ref 13–60)
LYMPHOCYTES # BLD AUTO: 1.31 10*3/MM3 (ref 0.7–3.1)
LYMPHOCYTES NFR BLD AUTO: 19 % (ref 19.6–45.3)
MCH RBC QN AUTO: 26.4 PG (ref 26.6–33)
MCHC RBC AUTO-ENTMCNC: 31.7 G/DL (ref 31.5–35.7)
MCV RBC AUTO: 83.2 FL (ref 79–97)
MONOCYTES # BLD AUTO: 0.57 10*3/MM3 (ref 0.1–0.9)
MONOCYTES NFR BLD AUTO: 8.3 % (ref 5–12)
NEUTROPHILS NFR BLD AUTO: 4.68 10*3/MM3 (ref 1.7–7)
NEUTROPHILS NFR BLD AUTO: 67.9 % (ref 42.7–76)
NITRITE UR QL STRIP: NEGATIVE
NRBC BLD AUTO-RTO: 0 /100 WBC (ref 0–0.2)
PH UR STRIP.AUTO: 6 [PH] (ref 5–8)
PLATELET # BLD AUTO: 257 10*3/MM3 (ref 140–450)
PMV BLD AUTO: 9.5 FL (ref 6–12)
POTASSIUM SERPL-SCNC: 4 MMOL/L (ref 3.5–5.2)
PROT SERPL-MCNC: 7.1 G/DL (ref 6–8.5)
PROT UR QL STRIP: NEGATIVE
PROTHROMBIN TIME: 13.5 SECONDS (ref 12.1–14.7)
RBC # BLD AUTO: 5.42 10*6/MM3 (ref 3.77–5.28)
RBC # UR STRIP: ABNORMAL /HPF
REF LAB TEST METHOD: ABNORMAL
SARS-COV-2 RNA RESP QL NAA+PROBE: NOT DETECTED
SODIUM SERPL-SCNC: 137 MMOL/L (ref 136–145)
SP GR UR STRIP: 1.02 (ref 1–1.03)
SQUAMOUS #/AREA URNS HPF: ABNORMAL /HPF
URATE SERPL-MCNC: 5.3 MG/DL (ref 2.4–5.7)
UROBILINOGEN UR QL STRIP: ABNORMAL
WBC # UR STRIP: ABNORMAL /HPF
WBC NRBC COR # BLD: 6.89 10*3/MM3 (ref 3.4–10.8)
WHOLE BLOOD HOLD COAG: NORMAL
WHOLE BLOOD HOLD SPECIMEN: NORMAL

## 2022-11-07 PROCEDURE — 85025 COMPLETE CBC W/AUTO DIFF WBC: CPT | Performed by: PHYSICIAN ASSISTANT

## 2022-11-07 PROCEDURE — 74176 CT ABD & PELVIS W/O CONTRAST: CPT

## 2022-11-07 PROCEDURE — C9803 HOPD COVID-19 SPEC COLLECT: HCPCS | Performed by: PHYSICIAN ASSISTANT

## 2022-11-07 PROCEDURE — 83690 ASSAY OF LIPASE: CPT | Performed by: PHYSICIAN ASSISTANT

## 2022-11-07 PROCEDURE — 36415 COLL VENOUS BLD VENIPUNCTURE: CPT

## 2022-11-07 PROCEDURE — 80053 COMPREHEN METABOLIC PANEL: CPT | Performed by: PHYSICIAN ASSISTANT

## 2022-11-07 PROCEDURE — 80074 ACUTE HEPATITIS PANEL: CPT | Performed by: PHYSICIAN ASSISTANT

## 2022-11-07 PROCEDURE — 83615 LACTATE (LD) (LDH) ENZYME: CPT | Performed by: PHYSICIAN ASSISTANT

## 2022-11-07 PROCEDURE — 86140 C-REACTIVE PROTEIN: CPT | Performed by: PHYSICIAN ASSISTANT

## 2022-11-07 PROCEDURE — 87636 SARSCOV2 & INF A&B AMP PRB: CPT | Performed by: PHYSICIAN ASSISTANT

## 2022-11-07 PROCEDURE — 81003 URINALYSIS AUTO W/O SCOPE: CPT | Performed by: PHYSICIAN ASSISTANT

## 2022-11-07 PROCEDURE — 85610 PROTHROMBIN TIME: CPT | Performed by: PHYSICIAN ASSISTANT

## 2022-11-07 PROCEDURE — 84550 ASSAY OF BLOOD/URIC ACID: CPT | Performed by: PHYSICIAN ASSISTANT

## 2022-11-07 PROCEDURE — 81001 URINALYSIS AUTO W/SCOPE: CPT | Performed by: PHYSICIAN ASSISTANT

## 2022-11-07 PROCEDURE — 99283 EMERGENCY DEPT VISIT LOW MDM: CPT

## 2022-11-07 PROCEDURE — 85730 THROMBOPLASTIN TIME PARTIAL: CPT | Performed by: PHYSICIAN ASSISTANT

## 2022-11-08 ENCOUNTER — OFFICE VISIT (OUTPATIENT)
Dept: FAMILY MEDICINE CLINIC | Facility: CLINIC | Age: 67
End: 2022-11-08

## 2022-11-08 VITALS
HEIGHT: 65 IN | SYSTOLIC BLOOD PRESSURE: 102 MMHG | WEIGHT: 204 LBS | BODY MASS INDEX: 33.99 KG/M2 | TEMPERATURE: 97.8 F | HEART RATE: 98 BPM | DIASTOLIC BLOOD PRESSURE: 82 MMHG | OXYGEN SATURATION: 96 %

## 2022-11-08 VITALS
WEIGHT: 200 LBS | TEMPERATURE: 97.8 F | DIASTOLIC BLOOD PRESSURE: 80 MMHG | BODY MASS INDEX: 33.32 KG/M2 | RESPIRATION RATE: 14 BRPM | OXYGEN SATURATION: 98 % | SYSTOLIC BLOOD PRESSURE: 138 MMHG | HEIGHT: 65 IN | HEART RATE: 84 BPM

## 2022-11-08 DIAGNOSIS — R79.89 ELEVATED LFTS: ICD-10-CM

## 2022-11-08 DIAGNOSIS — K57.92 ACUTE DIVERTICULITIS: ICD-10-CM

## 2022-11-08 DIAGNOSIS — L02.415 ABSCESS OF RIGHT LOWER EXTREMITY: ICD-10-CM

## 2022-11-08 DIAGNOSIS — C34.92 ADENOCARCINOMA OF LEFT LUNG: Primary | Chronic | ICD-10-CM

## 2022-11-08 PROBLEM — C34.90 ADENOCARCINOMA OF LUNG: Chronic | Status: ACTIVE | Noted: 2022-08-25

## 2022-11-08 PROBLEM — C34.90 ADENOCARCINOMA OF LUNG: Status: ACTIVE | Noted: 2022-08-25

## 2022-11-08 PROCEDURE — 99214 OFFICE O/P EST MOD 30 MIN: CPT | Performed by: INTERNAL MEDICINE

## 2022-11-08 RX ORDER — AMOXICILLIN AND CLAVULANATE POTASSIUM 875; 125 MG/1; MG/1
1 TABLET, FILM COATED ORAL 2 TIMES DAILY
Qty: 20 TABLET | Refills: 0 | Status: SHIPPED | OUTPATIENT
Start: 2022-11-08 | End: 2022-12-08

## 2022-11-08 RX ORDER — AMOXICILLIN AND CLAVULANATE POTASSIUM 875; 125 MG/1; MG/1
1 TABLET, FILM COATED ORAL ONCE
Status: DISCONTINUED | OUTPATIENT
Start: 2022-11-08 | End: 2022-11-08 | Stop reason: HOSPADM

## 2022-11-08 NOTE — PROGRESS NOTES
Answers for HPI/ROS submitted by the patient on 2022  Please describe your symptoms.: This is a check on the spot on the back of my right thigh behind the knee after 10 days of antibiotics & 5 days of anti bacterial? From [place].  Have you had these symptoms before?: No  How long have you been having these symptoms?: Greater than 2 weeks  Please list any medications you are currently taking for this condition.: I will have finished the prescription by the time I come in.  Please describe any probable cause for these symptoms. : No idea.  What is the primary reason for your visit?: Other      Patient Name: Savi Villalpando Today's Date: 2022   Patient MRN / CSN: [id number] / [id number] Date of Encounter: 2022   Patient Age / : 67 y.o. / 1955 Encounter Provider: Camila Soto DO   Referring Physician: No ref. provider found          Savi is a 67 y.o. female who is being seen today for Follow-up      History of Present Illness     Savi presents today for follow-up visit.  She has been taking Retevmo as prescribed by her oncologist at the [facility] for adenocarcinoma of the left lower lung.  She has had a dramatic improvement in her symptoms clinically as well as a dramatic decrease in the size of the left lung mass since starting Retevmo 6 weeks ago.  However, due to this medication, she requires close liver function monitoring.  Recently, her liver function test have been very elevated and she has developed diffuse itching.  Her oncologist received her labs yesterday that were done last week and recommended she go to the emergency department for further evaluation.  Her liver function test had improved slightly in the emergency department but ALT was still elevated at 317 U/L and AST still elevated at 256 U/L.  Her alkaline phosphatase was also elevated at 182 U/L.  Her oncologist has since contacted patient and told her to stop Retevmo.  She needs to have the liver function test repeated  weekly at this point.    While in the emergency department, patient underwent CT scan of the abdomen and pelvis due to elevated LFTs.  She was found to have acute diverticulitis.  She had noted left lower quadrant abdominal pain, worsening in nature.  She had also noted 1 day of dark stools and worsening diarrhea, which she attributed to her chemotherapy regimen.  She denies any fever.  She was prescribed Augmentin from the emergency department and reports tolerating her first dose of it well.    Allergies include:Metronidazole, Other, Sertraline, Shellfish-derived products, and Sulfa antibiotics  Current Outpatient Medications   Medication Sig Dispense Refill   • albuterol (PROVENTIL) (2.5 MG/3ML) 0.083% nebulizer solution Take 2.5 mg by nebulization Every 4 (Four) Hours As Needed for Wheezing.     • amoxicillin-clavulanate (AUGMENTIN) 875-125 MG per tablet Take 1 tablet by mouth 2 (Two) Times a Day. 20 tablet 0   • mometasone-formoterol (DULERA 200) 200-5 MCG/ACT inhaler Inhale 2 puffs 2 (Two) Times a Day. Pt currently taking once per day     • oxyCODONE (OXY-IR) 5 MG capsule Take 1 capsule by mouth Every 4 (Four) Hours As Needed for Moderate Pain. Pt taking every 8hrs     • sodium chloride 3 % nebulizer solution Take 4 mL by nebulization As Needed for Cough.     • acetaminophen (TYLENOL) 650 MG 8 hr tablet Take 2 tablets by mouth Every 8 (Eight) Hours As Needed for Mild Pain.     • benzonatate (TESSALON) 100 MG capsule Take 1 capsule by mouth 3 (Three) Times a Day As Needed for Cough.     • chlorpheniramine (CHLOR-TRIMETON) 4 MG tablet Take 1 tablet by mouth Every Night.     • Dextromethorphan-guaiFENesin (Mucinex DM Maximum Strength)  MG tablet sustained-release 12 hour Take  by mouth As Needed.     • dry mouth gel (BIOTENE ORALBALANCE) gel Apply  to the mouth or throat As Needed for Dry Mouth.     • Hypertonic Nasal Wash (Sinus Rinse) pack into the nostril(s) as directed by provider.     • LEVALBUTEROL  TARTRATE HFA IN Inhale.     • Melatonin 10 MG tablet Take  by mouth.     • ondansetron (ZOFRAN) 4 MG tablet Take 1 tablet by mouth Every 8 (Eight) Hours As Needed for Nausea or Vomiting.     • oxyCODONE (oxyCONTIN) 10 MG 12 hr tablet Take 1 tablet by mouth Every 12 (Twelve) Hours.     • polyethylene glycol (MIRALAX) 17 g packet Take 17 g by mouth Daily.     • pseudoephedrine (SUDAFED) 120 MG 12 hr tablet Take 1 tablet by mouth Daily As Needed for Congestion.     • Selpercatinib (RETEVMO) 80 MG capsule Take 2 capsules by mouth 2 (Two) Times a Day. 2 tablets, twice per day     • sennosides-docusate (PERICOLACE) 8.6-50 MG per tablet Take 1 tablet by mouth 2 (Two) Times a Day.     • vitamin E 20488 units external oil Apply  topically to the appropriate area as directed Daily.     • Wound Cleansers (WOUND WASH EX) Apply  topically.       No current facility-administered medications for this visit.     Past Medical History:   Diagnosis Date   • Allergies    • Diverticulitis    • Elevated liver enzymes    • Lung cancer (HCC)     stage 4   • Rib fracture      History reviewed. No pertinent family history.  Past Surgical History:   Procedure Laterality Date   • BILATERAL BREAST REDUCTION      3 lb removal   • CERVICAL FUSION      C567 partial 8   • CHOLECYSTECTOMY     • HAND SURGERY      x3   • HYSTERECTOMY Bilateral     total   • LYMPH NODE DISSECTION      back of neck   • TRIGGER FINGER RELEASE       Social History     Substance and Sexual Activity   Alcohol Use Never     Social History     Tobacco Use   Smoking Status Never   Smokeless Tobacco Never     Social History     Substance and Sexual Activity   Drug Use Never     Review of Systems   Constitutional: Negative for fever.   Respiratory: Negative for shortness of breath.    Cardiovascular: Negative for chest pain.   Gastrointestinal: Positive for abdominal pain and diarrhea.   Skin:        Small abscess on right posterior thigh which patient reports has improved, is  "no longer red or draining.  She reports some itching around this wound.        Depression Assessment Review:  PHQ-9 Total Score:    Vital Signs & Measurements Taken This Encounter  /82 (BP Location: Left arm, Patient Position: Sitting, Cuff Size: Large Adult)   Pulse 98   Temp 97.8 °F (36.6 °C) (Temporal)   Ht 165.1 cm (65\")   Wt 92.5 kg (204 lb)   SpO2 96%   BMI 33.95 kg/m²    SpO2 Percentage    11/08/22 1514   SpO2: 96%          Physical Exam  Vitals reviewed.   Constitutional:       General: She is not in acute distress.  HENT:      Head: Normocephalic and atraumatic.   Eyes:      General: No scleral icterus.     Extraocular Movements: Extraocular movements intact.      Conjunctiva/sclera: Conjunctivae normal.      Pupils: Pupils are equal, round, and reactive to light.   Cardiovascular:      Rate and Rhythm: Normal rate and regular rhythm.   Pulmonary:      Effort: Pulmonary effort is normal. No respiratory distress.      Breath sounds: Normal breath sounds.   Abdominal:      Palpations: Abdomen is soft.      Tenderness: There is abdominal tenderness.      Comments: Tenderness to palpation in left lower quadrant without guarding or rebound.   Musculoskeletal:         General: No swelling.      Cervical back: Neck supple. No tenderness.   Lymphadenopathy:      Cervical: No cervical adenopathy.   Skin:     General: Skin is warm and dry.      Coloration: Skin is not jaundiced.   Neurological:      Mental Status: She is alert.   Psychiatric:         Mood and Affect: Mood normal.         Behavior: Behavior normal.              Assessment & Plan  Patient Active Problem List   Diagnosis   • Adenocarcinoma of lung (HCC)   • Acute diverticulitis   • Elevated LFTs   • Abscess of right lower extremity       ICD-10-CM ICD-9-CM   1. Adenocarcinoma of left lung (HCC)  C34.92 162.9   2. Acute diverticulitis  K57.92 562.11   3. Elevated LFTs  R79.89 790.6   4. Abscess of right lower extremity  L02.415 682.6     No " orders of the defined types were placed in this encounter.      Meds Ordered During Visit:  No orders of the defined types were placed in this encounter.      I reviewed emergency department records and discussed with patient today.  I also reviewed her CT scan report and images with her today.  I encouraged her to continue her current regimen, including Augmentin therapy and Tylenol avoidance.  She is also going to continue off Retevmo at this time.  I encouraged her to follow-up closely with oncology, as she is planning to do.  I encouraged patient to have her ultrasound of the right lower extremity tomorrow as planned.    Return in about 2 weeks (around 11/22/2022), or if symptoms worsen or fail to improve, for Recheck.          Referring Provider (if known): No ref. provider found      This document has been electronically signed by Camila Soto DO  November 8, 2022 16:57 EST    Camila Soto DO, FACOI  [address] [place] [address]  [phone] (office)    Part of this note may be an electronic transcription/translation of spoken language to printed text using the Dragon Dictation System.

## 2022-11-08 NOTE — ED NOTES
MEDICAL SCREENING:    Reason for Visit: stage 4 lung cancer; elevated LFTs; itching, abdominal pain     Patient initially seen in triage.  The patient was advised further evaluation and diagnostic testing will be needed, some of the treatment and testing will be initiated in the lobby in order to begin the process.  The patient will be returned to the waiting area for the time being and possibly be re-assessed by a subsequent ED provider.  The patient will be brought back to the treatment area in as timely manner as possible.       Chastity Yarbrough PA  11/07/22 1945

## 2022-11-08 NOTE — ED PROVIDER NOTES
Subjective   History of Present Illness  67-year-old female presents to the ER with complaints of abnormal lab work.  Patient verbalized that she had labs obtained and was noted to have elevated liver enzymes.  Patient's care team told her to come to the ER for further work-up.  Patient does have a history of stage IV lung cancer in which she is on treatment right now of Retevmo.  Patient has been having her liver enzymes monitored secondary to hepatotoxicity and elevated AST and ALT that can be caused by her treatment.  Patient also states that she has been using acetaminophen for pain however she has stopped this secondary to the rise of her liver enzymes.  Patient also complains that she has been itching, and has been having some generalized abdominal pain as well.        Review of Systems   Constitutional: Negative.  Negative for fever.   HENT: Negative.    Respiratory: Negative.    Cardiovascular: Negative.  Negative for chest pain.   Gastrointestinal: Positive for abdominal pain.   Endocrine: Negative.    Genitourinary: Negative.  Negative for dysuria.   Skin: Negative.    Neurological: Negative.    Psychiatric/Behavioral: Negative.    All other systems reviewed and are negative.      Past Medical History:   Diagnosis Date   • Allergies    • Lung cancer (HCC)     stage 4   • Rib fracture        Allergies   Allergen Reactions   • Metronidazole Cough   • Other Anaphylaxis     Cough uncontrollably, all fragrances   • Shellfish-Derived Products Anaphylaxis   • Sulfa Antibiotics Anaphylaxis   • Sertraline GI Intolerance       Past Surgical History:   Procedure Laterality Date   • BILATERAL BREAST REDUCTION      3 lb removal   • CERVICAL FUSION      C567 partial 8   • CHOLECYSTECTOMY     • HAND SURGERY      x3   • HYSTERECTOMY Bilateral     total   • LYMPH NODE DISSECTION      back of neck   • TRIGGER FINGER RELEASE         No family history on file.    Social History     Socioeconomic History   • Marital status:     Tobacco Use   • Smoking status: Never   • Smokeless tobacco: Never   Vaping Use   • Vaping Use: Never used   Substance and Sexual Activity   • Alcohol use: Never   • Drug use: Never   • Sexual activity: Defer           Objective   Physical Exam  Vitals and nursing note reviewed.   Constitutional:       General: She is not in acute distress.     Appearance: She is well-developed. She is not diaphoretic.   HENT:      Head: Normocephalic and atraumatic.      Right Ear: External ear normal.      Left Ear: External ear normal.      Nose: Nose normal.   Eyes:      Conjunctiva/sclera: Conjunctivae normal.   Neck:      Vascular: No JVD.      Trachea: No tracheal deviation.   Cardiovascular:      Rate and Rhythm: Normal rate.      Heart sounds: No murmur heard.  Pulmonary:      Effort: Pulmonary effort is normal. No respiratory distress.      Breath sounds: No wheezing.   Abdominal:      Palpations: Abdomen is soft.      Tenderness: There is abdominal tenderness (generalized).   Musculoskeletal:         General: No deformity. Normal range of motion.      Cervical back: Normal range of motion and neck supple.   Skin:     General: Skin is warm and dry.      Coloration: Skin is not pale.      Findings: No erythema or rash.   Neurological:      Mental Status: She is alert and oriented to person, place, and time.      Cranial Nerves: No cranial nerve deficit.   Psychiatric:         Behavior: Behavior normal.         Thought Content: Thought content normal.         Procedures           ED Course  ED Course as of 11/08/22 0123   Mon Nov 07, 2022   2332 CT abd pelvis rad interpreted: 1. Acute diverticulitis. No drainable fluid collection bowel obstruction or free air.  2. Surgical absence of the gallbladder with dilatation of the extrahepatic and intrahepatic biliary tree, favored to be related to reservoir effect.  3. Noncalcified nodule in the right lower lobe measuring 4 mm. Indeterminate masslike consolidation in the  left lower lobe with both benign and malignant etiologies in the differential. Short-term follow-up CT in 3 months recommended for reassessment.  4. Postoperative changes. [RB]   Tue Nov 08, 2022   0112 Discussed patient with attending Dr. Gregg.  Went over findings of laboratory values.  Patient is trending downward with her liver enzymes, lipase is normal.  White count is normal very low concerns for cholangitis.  Patient is on chemotherapy Retevmo.  Hepatotoxicity and elevated AST and ALT are common reaction to this medication.  Patient's oncology team did tell her to stop the medication today.  Patient does have strict follow-up with her oncology team.  Patient also has stopped her acetaminophen use.  Incidental findings of acute diverticulitis will be treated with Augmentin. [RB]      ED Course User Index  [RB] Patrice Degroot II, PA                                           [facility]  Number of Diagnoses or Management Options  Acute diverticulitis: new and requires workup  Drug-induced hepatotoxicity: new and requires workup     Amount and/or Complexity of Data Reviewed  Clinical lab tests: ordered and reviewed  Tests in the radiology section of CPT®: ordered and reviewed  Decide to obtain previous medical records or to obtain history from someone other than the patient: yes  Discuss the patient with other providers: yes    Risk of Complications, Morbidity, and/or Mortality  Presenting problems: moderate  Diagnostic procedures: moderate  Management options: low    Patient Progress  Patient progress: stable      Final diagnoses:   Drug-induced hepatotoxicity   Acute diverticulitis       ED Disposition  ED Disposition     ED Disposition   Discharge    Condition   Stable    Comment   --             Camila Soto DO  [address]  [facility] [address]  [phone]    Schedule an appointment as soon as possible for a visit       Dr. Raheel Guillen  [address] [place] [address]  [id number]  [phone]  Schedule an appointment as soon as possible for a visit            Medication List      New Prescriptions    amoxicillin-clavulanate 875-125 MG per tablet  Commonly known as: AUGMENTIN  Take 1 tablet by mouth 2 (Two) Times a Day.        Stop    cefadroxil 500 MG capsule  Commonly known as: DURICEF           Where to Get Your Medications      These medications were sent to [facility] [address] [place] [address] - [phone] [place] [phone] [address] [address]    Phone: [phone]   · amoxicillin-clavulanate 875-125 MG per tablet          Patrice Degroot II, PA  11/08/22 0123

## 2022-11-08 NOTE — ED NOTES
Pt requested her scan results to be sent by fax to her oncologist. I spoke with radiology, and they said her doctor can call our medical records and have the scan faxed. I relayed the information to the patient and provided her a scanned disc of her CT image.

## 2022-11-09 ENCOUNTER — HOSPITAL ENCOUNTER (OUTPATIENT)
Dept: ULTRASOUND IMAGING | Facility: HOSPITAL | Age: 67
Discharge: HOME | End: 2022-11-09
Admitting: INTERNAL MEDICINE

## 2022-11-09 DIAGNOSIS — L02.91 ABSCESS: ICD-10-CM

## 2022-11-09 PROCEDURE — 76882 US LMTD JT/FCL EVL NVASC XTR: CPT | Performed by: RADIOLOGY

## 2022-11-09 PROCEDURE — 76999 ECHO EXAMINATION PROCEDURE: CPT

## 2022-11-14 ENCOUNTER — CLINICAL SUPPORT (OUTPATIENT)
Dept: FAMILY MEDICINE CLINIC | Facility: CLINIC | Age: 67
End: 2022-11-14

## 2022-11-14 ENCOUNTER — TRANSCRIBE ORDERS (OUTPATIENT)
Dept: FAMILY MEDICINE CLINIC | Facility: CLINIC | Age: 67
End: 2022-11-14

## 2022-11-14 DIAGNOSIS — C34.92 MALIGNANT NEOPLASM OF LEFT LUNG, UNSPECIFIED PART OF LUNG: Primary | ICD-10-CM

## 2022-11-14 PROCEDURE — 80053 COMPREHEN METABOLIC PANEL: CPT | Performed by: STUDENT IN AN ORGANIZED HEALTH CARE EDUCATION/TRAINING PROGRAM

## 2022-11-14 PROCEDURE — 36415 COLL VENOUS BLD VENIPUNCTURE: CPT | Performed by: INTERNAL MEDICINE

## 2022-11-14 NOTE — PROGRESS NOTES
Venipuncture Blood Specimen Collection  Venipuncture performed in RIGHT ARM by Viry Maldonado RN with good hemostasis. Patient tolerated the procedure well without complications.   11/14/22   Viry Maldonado RN

## 2022-11-15 LAB
ALBUMIN SERPL-MCNC: 3.9 G/DL (ref 3.5–5.2)
ALBUMIN/GLOB SERPL: 1.3 G/DL
ALP SERPL-CCNC: 136 U/L (ref 39–117)
ALT SERPL W P-5'-P-CCNC: 162 U/L (ref 1–33)
ANION GAP SERPL CALCULATED.3IONS-SCNC: 10.2 MMOL/L (ref 5–15)
AST SERPL-CCNC: 126 U/L (ref 1–32)
BILIRUB SERPL-MCNC: 0.6 MG/DL (ref 0–1.2)
BUN SERPL-MCNC: 11 MG/DL (ref 8–23)
BUN/CREAT SERPL: 13.6 (ref 7–25)
CALCIUM SPEC-SCNC: 9.8 MG/DL (ref 8.6–10.5)
CHLORIDE SERPL-SCNC: 102 MMOL/L (ref 98–107)
CO2 SERPL-SCNC: 27.8 MMOL/L (ref 22–29)
CREAT SERPL-MCNC: 0.81 MG/DL (ref 0.57–1)
EGFRCR SERPLBLD CKD-EPI 2021: 80.2 ML/MIN/1.73
GLOBULIN UR ELPH-MCNC: 3 GM/DL
GLUCOSE SERPL-MCNC: 83 MG/DL (ref 65–99)
POTASSIUM SERPL-SCNC: 4.3 MMOL/L (ref 3.5–5.2)
PROT SERPL-MCNC: 6.9 G/DL (ref 6–8.5)
SODIUM SERPL-SCNC: 140 MMOL/L (ref 136–145)

## 2022-11-21 ENCOUNTER — CLINICAL SUPPORT (OUTPATIENT)
Dept: FAMILY MEDICINE CLINIC | Facility: CLINIC | Age: 67
End: 2022-11-21

## 2022-11-21 DIAGNOSIS — C34.92 MALIGNANT NEOPLASM OF LEFT LUNG, UNSPECIFIED PART OF LUNG: ICD-10-CM

## 2022-11-21 PROCEDURE — 36415 COLL VENOUS BLD VENIPUNCTURE: CPT | Performed by: INTERNAL MEDICINE

## 2022-11-21 NOTE — PROGRESS NOTES
Venipuncture Blood Specimen Collection  Venipuncture performed in LEFT ARM by Viry Maldonado RN with good hemostasis. Patient tolerated the procedure well without complications.   11/21/22   Viry Maldoando RN

## 2022-11-22 ENCOUNTER — OFFICE VISIT (OUTPATIENT)
Dept: FAMILY MEDICINE CLINIC | Facility: CLINIC | Age: 67
End: 2022-11-22

## 2022-11-22 VITALS
DIASTOLIC BLOOD PRESSURE: 82 MMHG | SYSTOLIC BLOOD PRESSURE: 138 MMHG | BODY MASS INDEX: 34.61 KG/M2 | WEIGHT: 208 LBS | TEMPERATURE: 98 F | HEART RATE: 90 BPM | OXYGEN SATURATION: 99 %

## 2022-11-22 DIAGNOSIS — C34.92 ADENOCARCINOMA OF LEFT LUNG: Primary | Chronic | ICD-10-CM

## 2022-11-22 DIAGNOSIS — R11.0 NAUSEA: ICD-10-CM

## 2022-11-22 DIAGNOSIS — C34.92 MALIGNANT NEOPLASM OF LEFT LUNG, UNSPECIFIED PART OF LUNG: ICD-10-CM

## 2022-11-22 PROCEDURE — 80053 COMPREHEN METABOLIC PANEL: CPT | Performed by: STUDENT IN AN ORGANIZED HEALTH CARE EDUCATION/TRAINING PROGRAM

## 2022-11-22 PROCEDURE — 36415 COLL VENOUS BLD VENIPUNCTURE: CPT | Performed by: INTERNAL MEDICINE

## 2022-11-22 PROCEDURE — 99214 OFFICE O/P EST MOD 30 MIN: CPT | Performed by: INTERNAL MEDICINE

## 2022-11-22 RX ORDER — ONDANSETRON 4 MG/1
4 TABLET, FILM COATED ORAL EVERY 8 HOURS PRN
Qty: 40 TABLET | Refills: 2 | Status: SHIPPED | OUTPATIENT
Start: 2022-11-22 | End: 2023-01-11 | Stop reason: SDUPTHER

## 2022-11-22 NOTE — PROGRESS NOTES
Patient Name: Savi Villalpando Today's Date: 2022   Patient MRN / CSN: [id number] / [id number] Date of Encounter: 2022   Patient Age / : 67 y.o. / 1955 Encounter Provider: Camila Soto DO   Referring Physician: No ref. provider found          Savi is a 67 y.o. female who is being seen today for Follow-up      History of Present Illness    Savi presents today for a follow-up on adenocarcinoma of the left lower lung.  She had a wonderful response to treatment but has been off Retevmo for 2 weeks due to elevated LFTs.  Her liver function test have improved since being off of the medication.  She is planning to have labs redrawn today.  She is concerned that the left lung mass is growing since being off Retevmo.  She has noted more pain in the left lower lung/epigastric region.  She is also noticed an increase in coughing.  She denies any recent fever.  She takes oxycodone as prescribed by palliative care as needed and recalls taking it twice over the past 5 days with some improvement in her pain.  She is also noted increased nausea recently.  She currently is out of Zofran but would like to have some on hand.    Savi had a recent episode of acute diverticulitis.  She has finished Augmentin therapy since her last visit with me.  She reports the left lower quadrant pain has resolved and she is feeling much better in that regard.    Savi has plans to go back to the [facility] for repeat scans and oncology appointments next week.    Allergies include:Metronidazole, Other, Sertraline, Shellfish-derived products, and Sulfa antibiotics  Current Outpatient Medications   Medication Sig Dispense Refill   • oxyCODONE (OXY-IR) 5 MG capsule Take 1 capsule by mouth Every 4 (Four) Hours As Needed for Moderate Pain. Pt taking every 8hrs     • acetaminophen (TYLENOL) 650 MG 8 hr tablet Take 2 tablets by mouth Every 8 (Eight) Hours As Needed for Mild Pain.     • albuterol (PROVENTIL) (2.5 MG/3ML) 0.083%  nebulizer solution Take 2.5 mg by nebulization Every 4 (Four) Hours As Needed for Wheezing.     • amoxicillin-clavulanate (AUGMENTIN) 875-125 MG per tablet Take 1 tablet by mouth 2 (Two) Times a Day. 20 tablet 0   • benzonatate (TESSALON) 100 MG capsule Take 1 capsule by mouth 3 (Three) Times a Day As Needed for Cough.     • chlorpheniramine (CHLOR-TRIMETON) 4 MG tablet Take 1 tablet by mouth Every Night.     • Dextromethorphan-guaiFENesin (Mucinex DM Maximum Strength)  MG tablet sustained-release 12 hour Take  by mouth As Needed.     • dry mouth gel (BIOTENE ORALBALANCE) gel Apply  to the mouth or throat As Needed for Dry Mouth.     • Hypertonic Nasal Wash (Sinus Rinse) pack into the nostril(s) as directed by provider.     • LEVALBUTEROL TARTRATE HFA IN Inhale.     • Melatonin 10 MG tablet Take  by mouth.     • mometasone-formoterol (DULERA 200) 200-5 MCG/ACT inhaler Inhale 2 puffs 2 (Two) Times a Day. Pt currently taking once per day     • ondansetron (Zofran) 4 MG tablet Take 1 tablet by mouth Every 8 (Eight) Hours As Needed for Nausea or Vomiting. 40 tablet 2   • oxyCODONE (oxyCONTIN) 10 MG 12 hr tablet Take 1 tablet by mouth Every 12 (Twelve) Hours.     • polyethylene glycol (MIRALAX) 17 g packet Take 17 g by mouth Daily.     • pseudoephedrine (SUDAFED) 120 MG 12 hr tablet Take 1 tablet by mouth Daily As Needed for Congestion.     • Selpercatinib (RETEVMO) 80 MG capsule Take 2 capsules by mouth 2 (Two) Times a Day. 2 tablets, twice per day     • sennosides-docusate (PERICOLACE) 8.6-50 MG per tablet Take 1 tablet by mouth 2 (Two) Times a Day.     • sodium chloride 3 % nebulizer solution Take 4 mL by nebulization As Needed for Cough.     • vitamin E 09716 units external oil Apply  topically to the appropriate area as directed Daily.     • Wound Cleansers (WOUND WASH EX) Apply  topically.       No current facility-administered medications for this visit.     Past Medical History:   Diagnosis Date   •  Allergies    • Diverticulitis    • Elevated liver enzymes    • Lung cancer (HCC)     stage 4   • Rib fracture      History reviewed. No pertinent family history.  Past Surgical History:   Procedure Laterality Date   • BILATERAL BREAST REDUCTION      3 lb removal   • CERVICAL FUSION      C567 partial 8   • CHOLECYSTECTOMY     • HAND SURGERY      x3   • HYSTERECTOMY Bilateral     total   • LYMPH NODE DISSECTION      back of neck   • TRIGGER FINGER RELEASE       Social History     Substance and Sexual Activity   Alcohol Use Never     Social History     Tobacco Use   Smoking Status Never   Smokeless Tobacco Never     Social History     Substance and Sexual Activity   Drug Use Never     Review of Systems   Constitutional: Negative for fever.   Respiratory: Positive for cough.    Gastrointestinal: Positive for nausea.   Musculoskeletal:        Left lower lung/epigastric pain. Patient reports this is where the lung cancer is and she is concerned the tumor is growing. She has scans planned for next week and then oncology follow up at the [facility].         Depression Assessment Review:  PHQ-9 Total Score:    Vital Signs & Measurements Taken This Encounter  /82 (BP Location: Left arm, Patient Position: Sitting, Cuff Size: Adult)   Pulse 90   Temp 98 °F (36.7 °C) (Temporal)   Wt 94.3 kg (208 lb)   SpO2 99%   BMI 34.61 kg/m²    SpO2 Percentage    11/22/22 1330   SpO2: 99%            Physical Exam  Vitals reviewed.   Constitutional:       General: She is not in acute distress.  HENT:      Head: Normocephalic and atraumatic.   Eyes:      General: No scleral icterus.     Extraocular Movements: Extraocular movements intact.      Conjunctiva/sclera: Conjunctivae normal.      Pupils: Pupils are equal, round, and reactive to light.   Cardiovascular:      Rate and Rhythm: Normal rate and regular rhythm.   Pulmonary:      Effort: Pulmonary effort is normal. No respiratory distress.      Breath sounds: Normal breath sounds.  No wheezing or rhonchi.   Abdominal:      Palpations: Abdomen is soft.      Tenderness: There is no abdominal tenderness. There is no guarding or rebound.   Musculoskeletal:         General: No swelling.      Cervical back: Neck supple. No tenderness.   Lymphadenopathy:      Cervical: No cervical adenopathy.   Skin:     General: Skin is warm and dry.      Coloration: Skin is not jaundiced.      Comments: Right posterior thigh wound healing without erythema, drainage or odor.    Neurological:      Mental Status: She is alert.   Psychiatric:         Mood and Affect: Mood normal.         Behavior: Behavior normal.              Assessment & Plan  Patient Active Problem List   Diagnosis   • Adenocarcinoma of lung (HCC)   • Acute diverticulitis   • Elevated LFTs   • Abscess of right lower extremity       ICD-10-CM ICD-9-CM   1. Adenocarcinoma of left lung (HCC)  C34.92 162.9   2. Malignant neoplasm of left lung, unspecified part of lung (HCC)  C34.92 162.9   3. Nausea  R11.0 787.02     No orders of the defined types were placed in this encounter.      Meds Ordered During Visit:  New Medications Ordered This Visit   Medications   • ondansetron (Zofran) 4 MG tablet     Sig: Take 1 tablet by mouth Every 8 (Eight) Hours As Needed for Nausea or Vomiting.     Dispense:  40 tablet     Refill:  2     I discussed Zofran therapy with patient today.  She is up-to-date on her other prescriptions at this time.  We will repeat CMP today as ordered by patient's oncologist.  I encouraged patient to follow-up with oncology next week as planned.    Return in about 2 weeks (around 12/6/2022), or if symptoms worsen or fail to improve, for Recheck.          Referring Provider (if known): No ref. provider found      This document has been electronically signed by Camila Soto DO  November 22, 2022 14:25 EST    Camila Soto DO, FACOI  [address] [place] [address]  [phone] (office)    Part of this note may be an  electronic transcription/translation of spoken language to printed text using the Dragon Dictation System.  Answers for HPI/ROS submitted by the patient on 11/15/2022  Please describe your symptoms.: Recheck of diverticulitis and and of antibiotics.  Have you had these symptoms before?: Yes  How long have you been having these symptoms?: 1-2 weeks  Please list any medications you are currently taking for this condition.: November 18, 2022 is the last day of the prescription.  Please describe any probable cause for these symptoms. : Diverticulitis as diagnosed at emergency room on November 7th, 2022.  What is the primary reason for your visit?: Other

## 2022-11-22 NOTE — PROGRESS NOTES
Venipuncture Blood Specimen Collection  Venipuncture performed in RIGHT ARM by Viry Maldonado RN with good hemostasis. Patient tolerated the procedure well without complications. This lab had to be redrawn due to hemolysis of specimen collected yesterday.    11/22/22   Viry Maldonado RN

## 2022-11-23 LAB
ALBUMIN SERPL-MCNC: 4.1 G/DL (ref 3.5–5.2)
ALBUMIN/GLOB SERPL: 1.8 G/DL
ALP SERPL-CCNC: 95 U/L (ref 39–117)
ALT SERPL W P-5'-P-CCNC: 69 U/L (ref 1–33)
ANION GAP SERPL CALCULATED.3IONS-SCNC: 10.5 MMOL/L (ref 5–15)
AST SERPL-CCNC: 54 U/L (ref 1–32)
BILIRUB SERPL-MCNC: 0.3 MG/DL (ref 0–1.2)
BUN SERPL-MCNC: 15 MG/DL (ref 8–23)
BUN/CREAT SERPL: 19.5 (ref 7–25)
CALCIUM SPEC-SCNC: 9.2 MG/DL (ref 8.6–10.5)
CHLORIDE SERPL-SCNC: 104 MMOL/L (ref 98–107)
CO2 SERPL-SCNC: 25.5 MMOL/L (ref 22–29)
CREAT SERPL-MCNC: 0.77 MG/DL (ref 0.57–1)
EGFRCR SERPLBLD CKD-EPI 2021: 85.2 ML/MIN/1.73
GLOBULIN UR ELPH-MCNC: 2.3 GM/DL
GLUCOSE SERPL-MCNC: 80 MG/DL (ref 65–99)
POTASSIUM SERPL-SCNC: 4.2 MMOL/L (ref 3.5–5.2)
PROT SERPL-MCNC: 6.4 G/DL (ref 6–8.5)
SODIUM SERPL-SCNC: 140 MMOL/L (ref 136–145)

## 2022-12-05 ENCOUNTER — CLINICAL SUPPORT (OUTPATIENT)
Dept: FAMILY MEDICINE CLINIC | Facility: CLINIC | Age: 67
End: 2022-12-05

## 2022-12-05 DIAGNOSIS — C34.92 MALIGNANT NEOPLASM OF LEFT LUNG, UNSPECIFIED PART OF LUNG: ICD-10-CM

## 2022-12-05 PROCEDURE — 80053 COMPREHEN METABOLIC PANEL: CPT | Performed by: STUDENT IN AN ORGANIZED HEALTH CARE EDUCATION/TRAINING PROGRAM

## 2022-12-05 PROCEDURE — 36415 COLL VENOUS BLD VENIPUNCTURE: CPT | Performed by: INTERNAL MEDICINE

## 2022-12-05 NOTE — PROGRESS NOTES
Venipuncture Blood Specimen Collection  Venipuncture performed in RIGHT ARM by Viry Maldonado RN with good hemostasis. Patient tolerated the procedure well without complications.   12/05/22   Viry Maldonado RN

## 2022-12-06 LAB
ALBUMIN SERPL-MCNC: 4.2 G/DL (ref 3.5–5.2)
ALBUMIN/GLOB SERPL: 1.7 G/DL
ALP SERPL-CCNC: 109 U/L (ref 39–117)
ALT SERPL W P-5'-P-CCNC: 77 U/L (ref 1–33)
ANION GAP SERPL CALCULATED.3IONS-SCNC: 10.4 MMOL/L (ref 5–15)
AST SERPL-CCNC: 61 U/L (ref 1–32)
BILIRUB SERPL-MCNC: 0.7 MG/DL (ref 0–1.2)
BUN SERPL-MCNC: 12 MG/DL (ref 8–23)
BUN/CREAT SERPL: 13.3 (ref 7–25)
CALCIUM SPEC-SCNC: 9.4 MG/DL (ref 8.6–10.5)
CHLORIDE SERPL-SCNC: 101 MMOL/L (ref 98–107)
CO2 SERPL-SCNC: 27.6 MMOL/L (ref 22–29)
CREAT SERPL-MCNC: 0.9 MG/DL (ref 0.57–1)
EGFRCR SERPLBLD CKD-EPI 2021: 70.7 ML/MIN/1.73
GLOBULIN UR ELPH-MCNC: 2.5 GM/DL
GLUCOSE SERPL-MCNC: 80 MG/DL (ref 65–99)
POTASSIUM SERPL-SCNC: 4.2 MMOL/L (ref 3.5–5.2)
PROT SERPL-MCNC: 6.7 G/DL (ref 6–8.5)
SODIUM SERPL-SCNC: 139 MMOL/L (ref 136–145)

## 2022-12-08 ENCOUNTER — OFFICE VISIT (OUTPATIENT)
Dept: FAMILY MEDICINE CLINIC | Facility: CLINIC | Age: 67
End: 2022-12-08

## 2022-12-08 VITALS
DIASTOLIC BLOOD PRESSURE: 88 MMHG | BODY MASS INDEX: 34.58 KG/M2 | OXYGEN SATURATION: 97 % | TEMPERATURE: 97.7 F | WEIGHT: 207.8 LBS | HEART RATE: 84 BPM | SYSTOLIC BLOOD PRESSURE: 122 MMHG

## 2022-12-08 DIAGNOSIS — Z12.31 ENCOUNTER FOR SCREENING MAMMOGRAM FOR MALIGNANT NEOPLASM OF BREAST: ICD-10-CM

## 2022-12-08 DIAGNOSIS — C34.92 ADENOCARCINOMA OF LEFT LUNG: Primary | Chronic | ICD-10-CM

## 2022-12-08 DIAGNOSIS — Z78.0 POSTMENOPAUSAL: ICD-10-CM

## 2022-12-08 PROCEDURE — 99214 OFFICE O/P EST MOD 30 MIN: CPT | Performed by: INTERNAL MEDICINE

## 2022-12-08 RX ORDER — DOXYCYCLINE HYCLATE 100 MG/1
100 CAPSULE ORAL
COMMUNITY
Start: 2022-10-25 | End: 2022-12-08

## 2022-12-08 NOTE — PROGRESS NOTES
Patient Name: Savi Villalpando Today's Date: 2022   Patient MRN / CSN: [id number] / [id number] Date of Encounter: 2022   Patient Age / : 67 y.o. / 1955 Encounter Provider: Camila Soto DO   Referring Physician: No ref. provider found          Savi is a 67 y.o. female who is being seen today for Follow-up      History of Present Illness     Savi presents today for follow-up on adenocarcinoma of the lung.  Since last visit with me, she followed up with her oncologist and palliative care specialist at the [facility].  She was restarted on Retevmo at a lower dose and reports tolerating this dose well.  She is still planning to have weekly labs drawn here for her oncologist.  She reports her pain is well controlled with the current regimen but she would like to establish with palliative care locally.    aSvi reports having rib fracture since last visit.  She has had 3 rib fractures within the last year that she knows of with minimal trauma.  She has not had a DEXA scan in many years but is agreeable to updating this soon.  She is also due for mammogram and agrees to updating this as well.    Allergies include:Fluad quadrivalent [influenza vac a&b sa adj quad], Metronidazole, Multi complete-iron [anti-oxidant], Other, Sertraline, Shellfish-derived products, and Sulfa antibiotics  Current Outpatient Medications   Medication Sig Dispense Refill   • ondansetron (Zofran) 4 MG tablet Take 1 tablet by mouth Every 8 (Eight) Hours As Needed for Nausea or Vomiting. 40 tablet 2   • oxyCODONE (OXY-IR) 5 MG capsule Take 1 capsule by mouth Every 4 (Four) Hours As Needed for Moderate Pain. Pt taking every 8hrs     • polyethylene glycol (MIRALAX) 17 g packet Take 17 g by mouth Daily.     • Selpercatinib (RETEVMO) 80 MG capsule Take 80 mg by mouth 2 (Two) Times a Day.     • sennosides-docusate (PERICOLACE) 8.6-50 MG per tablet Take 1 tablet by mouth 2 (Two) Times a Day.     • acetaminophen (TYLENOL) 650 MG 8  hr tablet Take 2 tablets by mouth Every 8 (Eight) Hours As Needed for Mild Pain.     • albuterol (PROVENTIL) (2.5 MG/3ML) 0.083% nebulizer solution Take 2.5 mg by nebulization Every 4 (Four) Hours As Needed for Wheezing.     • benzonatate (TESSALON) 100 MG capsule Take 1 capsule by mouth 3 (Three) Times a Day As Needed for Cough.     • chlorpheniramine (CHLOR-TRIMETON) 4 MG tablet Take 1 tablet by mouth Every Night.     • Dextromethorphan-guaiFENesin (Mucinex DM Maximum Strength)  MG tablet sustained-release 12 hour Take  by mouth As Needed.     • dry mouth gel (BIOTENE ORALBALANCE) gel Apply  to the mouth or throat As Needed for Dry Mouth.     • Hypertonic Nasal Wash (Sinus Rinse) pack into the nostril(s) as directed by provider.     • LEVALBUTEROL TARTRATE HFA IN Inhale.     • Melatonin 10 MG tablet Take  by mouth.     • mometasone-formoterol (DULERA 200) 200-5 MCG/ACT inhaler Inhale 2 puffs 2 (Two) Times a Day. Pt currently taking once per day     • oxyCODONE (oxyCONTIN) 10 MG 12 hr tablet Take 1 tablet by mouth Every 12 (Twelve) Hours.     • pseudoephedrine (SUDAFED) 120 MG 12 hr tablet Take 1 tablet by mouth Daily As Needed for Congestion.     • sodium chloride 3 % nebulizer solution Take 4 mL by nebulization As Needed for Cough.     • vitamin E 27300 units external oil Apply  topically to the appropriate area as directed Daily.     • Wound Cleansers (WOUND WASH EX) Apply  topically.       No current facility-administered medications for this visit.     Past Medical History:   Diagnosis Date   • Allergic 1956   • Allergies    • Anxiety 1970   • Arthritis 2010   • Asthma 1956   • Cholelithiasis 1976   • Diverticulitis    • Elevated liver enzymes    • HL (hearing loss) 2011   • Lung cancer (HCC)     stage 4   • Obesity 1997   • Rib fracture      Family History   Problem Relation Age of Onset   • Arthritis Mother    • Mental illness Mother    • Heart disease Father    • Asthma Maternal Grandmother    •  Hearing loss Maternal Grandmother    • Alcohol abuse Sister    • Cancer Maternal Uncle    • Miscarriages / Stillbirths Daughter      Past Surgical History:   Procedure Laterality Date   • BILATERAL BREAST REDUCTION      3 lb removal   • BREAST SURGERY  2021   • CERVICAL FUSION      C567 partial 8   • CHOLECYSTECTOMY     • COSMETIC SURGERY  2021   • EYE SURGERY  2000   • HAND SURGERY      x3   • HYSTERECTOMY Bilateral     total   • LYMPH NODE DISSECTION      back of neck   • TRIGGER FINGER RELEASE       Social History     Substance and Sexual Activity   Alcohol Use Never     Social History     Tobacco Use   Smoking Status Never   Smokeless Tobacco Never   Tobacco Comments    Both parents smoked     Social History     Substance and Sexual Activity   Drug Use Never     Review of Systems   Constitutional: Negative for fever.   Gastrointestinal: Negative for abdominal pain.   Musculoskeletal: Positive for arthralgias.        Depression Assessment Review:  PHQ-9 Total Score:    Vital Signs & Measurements Taken This Encounter  /88 (BP Location: Right arm, Patient Position: Sitting, Cuff Size: Adult)   Pulse 84   Temp 97.7 °F (36.5 °C) (Temporal)   Wt 94.3 kg (207 lb 12.8 oz)   SpO2 97%   BMI 34.58 kg/m²    SpO2 Percentage    12/08/22 0920   SpO2: 97%            Physical Exam  Vitals reviewed.   Constitutional:       General: She is not in acute distress.  HENT:      Head: Normocephalic and atraumatic.   Eyes:      General: No scleral icterus.     Extraocular Movements: Extraocular movements intact.      Conjunctiva/sclera: Conjunctivae normal.      Pupils: Pupils are equal, round, and reactive to light.   Cardiovascular:      Rate and Rhythm: Normal rate and regular rhythm.   Pulmonary:      Effort: Pulmonary effort is normal. No respiratory distress.      Breath sounds: Normal breath sounds. No wheezing or rhonchi.   Abdominal:      Palpations: Abdomen is soft.      Tenderness: There is no abdominal tenderness.  There is no guarding or rebound.   Musculoskeletal:         General: No swelling.      Cervical back: Neck supple. No tenderness.   Lymphadenopathy:      Cervical: No cervical adenopathy.   Skin:     General: Skin is warm and dry.      Coloration: Skin is not jaundiced.   Neurological:      Mental Status: She is alert.   Psychiatric:         Mood and Affect: Mood normal.         Behavior: Behavior normal.              Assessment & Plan  Patient Active Problem List   Diagnosis   • Adenocarcinoma of lung (HCC)   • Acute diverticulitis   • Elevated LFTs   • Abscess of right lower extremity       ICD-10-CM ICD-9-CM   1. Adenocarcinoma of left lung (HCC)  C34.92 162.9   2. Encounter for screening mammogram for malignant neoplasm of breast  Z12.31 V76.12   3. Postmenopausal  Z78.0 V49.81     Orders Placed This Encounter   Procedures   • Mammo Screening Digital Tomosynthesis Bilateral With CAD     Standing Status:   Future     Standing Expiration Date:   12/8/2023     Order Specific Question:   Reason for Exam:     Answer:   screening for breast ca   • DEXA Bone Density Axial     Standing Status:   Future     Standing Expiration Date:   12/8/2023     Scheduling Instructions:      Schedule with mammogram     Order Specific Question:   Reason for Exam:     Answer:   postmenopausal   • Ambulatory Referral to Palliative Care     Referral Priority:   Routine     Referral Type:   Hospice     Referral Reason:   Specialty Services Required     Requested Specialty:   Hospice and Palliative Medicine     Number of Visits Requested:   1       Meds Ordered During Visit:  No orders of the defined types were placed in this encounter.    I reviewed oncology and palliative care notes from patient's recent visits to [facility]. She is planning to follow up with oncology in 3 months in person. She is planning to continue weekly labs. She is interested in establishing with Palliative Care locally and I will order this referral today.  We will also  update mammogram and bone density test soon.  Patient is going to check with her oncologist in regards to getting the pneumonia vaccine soon.    Return in about 1 month (around 1/8/2023), or if symptoms worsen or fail to improve, for Recheck.          Referring Provider (if known): No ref. provider found      This document has been electronically signed by Camila Soto DO  December 8, 2022 10:38 EST    Camila Soto DO, FACOI  [address] [place] [address]  [phone] (office)    Part of this note may be an electronic transcription/translation of spoken language to printed text using the Dragon Dictation System.  Answers for HPI/ROS submitted by the patient on 12/1/2022  Please describe your symptoms.: Weekly blood draw  Have you had these symptoms before?: Yes  How long have you been having these symptoms?: Greater than 2 weeks  Please list any medications you are currently taking for this condition.: Back on Retevmo, half dose.  What is the primary reason for your visit?: Other

## 2022-12-12 ENCOUNTER — CLINICAL SUPPORT (OUTPATIENT)
Dept: FAMILY MEDICINE CLINIC | Facility: CLINIC | Age: 67
End: 2022-12-12

## 2022-12-12 DIAGNOSIS — C34.92 MALIGNANT NEOPLASM OF LEFT LUNG, UNSPECIFIED PART OF LUNG: ICD-10-CM

## 2022-12-12 PROCEDURE — 80053 COMPREHEN METABOLIC PANEL: CPT | Performed by: STUDENT IN AN ORGANIZED HEALTH CARE EDUCATION/TRAINING PROGRAM

## 2022-12-12 PROCEDURE — 36415 COLL VENOUS BLD VENIPUNCTURE: CPT | Performed by: INTERNAL MEDICINE

## 2022-12-12 NOTE — PROGRESS NOTES
Venipuncture Blood Specimen Collection  Venipuncture performed in LEFT ARM by Viry Maldonado RN with good hemostasis. Patient tolerated the procedure well without complications.   12/12/22   Viry Maldonado RN

## 2022-12-13 LAB
ALBUMIN SERPL-MCNC: 4.2 G/DL (ref 3.5–5.2)
ALBUMIN/GLOB SERPL: 1.7 G/DL
ALP SERPL-CCNC: 100 U/L (ref 39–117)
ALT SERPL W P-5'-P-CCNC: 50 U/L (ref 1–33)
ANION GAP SERPL CALCULATED.3IONS-SCNC: 9 MMOL/L (ref 5–15)
AST SERPL-CCNC: 43 U/L (ref 1–32)
BILIRUB SERPL-MCNC: 0.6 MG/DL (ref 0–1.2)
BUN SERPL-MCNC: 8 MG/DL (ref 8–23)
BUN/CREAT SERPL: 8.6 (ref 7–25)
CALCIUM SPEC-SCNC: 9.7 MG/DL (ref 8.6–10.5)
CHLORIDE SERPL-SCNC: 104 MMOL/L (ref 98–107)
CO2 SERPL-SCNC: 28 MMOL/L (ref 22–29)
CREAT SERPL-MCNC: 0.93 MG/DL (ref 0.57–1)
EGFRCR SERPLBLD CKD-EPI 2021: 67.9 ML/MIN/1.73
GLOBULIN UR ELPH-MCNC: 2.5 GM/DL
GLUCOSE SERPL-MCNC: 83 MG/DL (ref 65–99)
POTASSIUM SERPL-SCNC: 4.4 MMOL/L (ref 3.5–5.2)
PROT SERPL-MCNC: 6.7 G/DL (ref 6–8.5)
SODIUM SERPL-SCNC: 141 MMOL/L (ref 136–145)

## 2022-12-19 ENCOUNTER — OFFICE VISIT (OUTPATIENT)
Dept: FAMILY MEDICINE CLINIC | Facility: CLINIC | Age: 67
End: 2022-12-19

## 2022-12-19 ENCOUNTER — CLINICAL SUPPORT (OUTPATIENT)
Dept: FAMILY MEDICINE CLINIC | Facility: CLINIC | Age: 67
End: 2022-12-19

## 2022-12-19 VITALS
OXYGEN SATURATION: 95 % | DIASTOLIC BLOOD PRESSURE: 82 MMHG | SYSTOLIC BLOOD PRESSURE: 110 MMHG | HEART RATE: 95 BPM | BODY MASS INDEX: 35.12 KG/M2 | HEIGHT: 65 IN | WEIGHT: 210.8 LBS | TEMPERATURE: 97.5 F

## 2022-12-19 DIAGNOSIS — K59.00 CONSTIPATION, UNSPECIFIED CONSTIPATION TYPE: ICD-10-CM

## 2022-12-19 DIAGNOSIS — C34.92 MALIGNANT NEOPLASM OF LEFT LUNG, UNSPECIFIED PART OF LUNG: ICD-10-CM

## 2022-12-19 DIAGNOSIS — K57.92 ACUTE DIVERTICULITIS: Primary | ICD-10-CM

## 2022-12-19 PROCEDURE — 36415 COLL VENOUS BLD VENIPUNCTURE: CPT | Performed by: NURSE PRACTITIONER

## 2022-12-19 PROCEDURE — 80053 COMPREHEN METABOLIC PANEL: CPT | Performed by: STUDENT IN AN ORGANIZED HEALTH CARE EDUCATION/TRAINING PROGRAM

## 2022-12-19 PROCEDURE — 99213 OFFICE O/P EST LOW 20 MIN: CPT | Performed by: NURSE PRACTITIONER

## 2022-12-19 RX ORDER — LACTULOSE 10 G/15ML
20 SOLUTION ORAL 2 TIMES DAILY PRN
Qty: 150 ML | Refills: 0 | Status: SHIPPED | OUTPATIENT
Start: 2022-12-19 | End: 2023-01-11

## 2022-12-19 RX ORDER — AMOXICILLIN AND CLAVULANATE POTASSIUM 875; 125 MG/1; MG/1
1 TABLET, FILM COATED ORAL 2 TIMES DAILY
Qty: 20 TABLET | Refills: 0 | Status: SHIPPED | OUTPATIENT
Start: 2022-12-19 | End: 2022-12-29

## 2022-12-19 NOTE — PROGRESS NOTES
Patient Name: Savi Villalpando Today's Date: 2022   Patient MRN / CSN: [id number] / [id number] Date of Encounter: 2022   Patient Age / : 67 y.o. / 1955 Encounter Provider: CHEIKH Mcgarry   Referring Physician: No ref. provider found          Savi is a 67 y.o. female who is being seen today for GI Problem      GI Problem  The primary symptoms include abdominal pain and nausea. The illness began 2 days ago. The onset was sudden.   The illness is also significant for constipation. Significant associated medical issues include diverticulitis.       Allergies include:Fluad quadrivalent [influenza vac a&b sa adj quad], Metronidazole, Multi complete-iron [anti-oxidant], Other, Sertraline, Shellfish-derived products, and Sulfa antibiotics  Current Outpatient Medications   Medication Sig Dispense Refill   • benzonatate (TESSALON) 100 MG capsule Take 1 capsule by mouth 3 (Three) Times a Day As Needed for Cough.     • mometasone-formoterol (DULERA 200) 200-5 MCG/ACT inhaler Inhale 2 puffs 2 (Two) Times a Day. Pt currently taking once per day     • ondansetron (Zofran) 4 MG tablet Take 1 tablet by mouth Every 8 (Eight) Hours As Needed for Nausea or Vomiting. 40 tablet 2   • oxyCODONE (OXY-IR) 5 MG capsule Take 1 capsule by mouth Every 4 (Four) Hours As Needed for Moderate Pain. Pt taking every 8hrs     • oxyCODONE (oxyCONTIN) 10 MG 12 hr tablet Take 1 tablet by mouth Every 12 (Twelve) Hours.     • Selpercatinib (RETEVMO) 80 MG capsule Take 80 mg by mouth 2 (Two) Times a Day.     • sennosides-docusate (PERICOLACE) 8.6-50 MG per tablet Take 1 tablet by mouth 2 (Two) Times a Day.     • acetaminophen (TYLENOL) 650 MG 8 hr tablet Take 2 tablets by mouth Every 8 (Eight) Hours As Needed for Mild Pain.     • albuterol (PROVENTIL) (2.5 MG/3ML) 0.083% nebulizer solution Take 2.5 mg by nebulization Every 4 (Four) Hours As Needed for Wheezing.     • amoxicillin-clavulanate (Augmentin) 875-125 MG per tablet Take 1  tablet by mouth 2 (Two) Times a Day for 10 days. 20 tablet 0   • chlorpheniramine (CHLOR-TRIMETON) 4 MG tablet Take 1 tablet by mouth Every Night.     • Dextromethorphan-guaiFENesin (Mucinex DM Maximum Strength)  MG tablet sustained-release 12 hour Take  by mouth As Needed.     • dry mouth gel (BIOTENE ORALBALANCE) gel Apply  to the mouth or throat As Needed for Dry Mouth.     • Hypertonic Nasal Wash (Sinus Rinse) pack into the nostril(s) as directed by provider.     • lactulose (CHRONULAC) 10 GM/15ML solution Take 30 mL by mouth 2 (Two) Times a Day As Needed (CONSTIPATION). 150 mL 0   • LEVALBUTEROL TARTRATE HFA IN Inhale.     • Melatonin 10 MG tablet Take  by mouth.     • polyethylene glycol (MIRALAX) 17 g packet Take 17 g by mouth Daily.     • pseudoephedrine (SUDAFED) 120 MG 12 hr tablet Take 1 tablet by mouth Daily As Needed for Congestion.     • sodium chloride 3 % nebulizer solution Take 4 mL by nebulization As Needed for Cough.     • vitamin E 49029 units external oil Apply  topically to the appropriate area as directed Daily.     • Wound Cleansers (WOUND WASH EX) Apply  topically.       No current facility-administered medications for this visit.     Past Medical History:   Diagnosis Date   • Allergic 1956   • Allergies    • Anxiety 1970   • Arthritis 2010   • Asthma 1956   • Cholelithiasis 1976   • Diverticulitis    • Elevated liver enzymes    • HL (hearing loss) 2011   • Lung cancer (HCC)     stage 4   • Obesity 1997   • Rib fracture      Family History   Problem Relation Age of Onset   • Arthritis Mother    • Mental illness Mother    • Heart disease Father    • Asthma Maternal Grandmother    • Hearing loss Maternal Grandmother    • Alcohol abuse Sister    • Cancer Maternal Uncle    • Miscarriages / Stillbirths Daughter      Past Surgical History:   Procedure Laterality Date   • BILATERAL BREAST REDUCTION      3 lb removal   • BREAST SURGERY  2021   • CERVICAL FUSION      C567 partial 8   •  "CHOLECYSTECTOMY     • COSMETIC SURGERY  2021   • EYE SURGERY  2000   • HAND SURGERY      x3   • HYSTERECTOMY Bilateral     total   • LYMPH NODE DISSECTION      back of neck   • TRIGGER FINGER RELEASE       Social History     Substance and Sexual Activity   Alcohol Use Never     Social History     Tobacco Use   Smoking Status Never   Smokeless Tobacco Never   Tobacco Comments    Both parents smoked     Social History     Substance and Sexual Activity   Drug Use Never     Review of Systems   Constitutional: Negative.    HENT: Negative.    Eyes: Negative.    Respiratory: Negative.    Cardiovascular: Negative.    Gastrointestinal: Positive for abdominal pain, constipation and nausea.   Endocrine: Negative.    Genitourinary: Negative.    Musculoskeletal: Negative.    Skin: Negative.    Allergic/Immunologic: Negative.    Neurological: Negative.    Hematological: Negative.    Psychiatric/Behavioral: Negative.         Depression Assessment Review:  PHQ-9 Total Score:    Vital Signs & Measurements Taken This Encounter  /82 (BP Location: Left arm, Patient Position: Sitting, Cuff Size: Adult)   Pulse 95   Temp 97.5 °F (36.4 °C) (Temporal)   Ht 165.1 cm (65\")   Wt 95.6 kg (210 lb 12.8 oz)   SpO2 95%   BMI 35.08 kg/m²    SpO2 Percentage    12/19/22 0820   SpO2: 95%              Physical Exam  Constitutional:       Appearance: Normal appearance.   HENT:      Nose: Nose normal.      Mouth/Throat:      Mouth: Mucous membranes are moist.   Cardiovascular:      Rate and Rhythm: Normal rate and regular rhythm.      Pulses: Normal pulses.      Heart sounds: Normal heart sounds.   Pulmonary:      Effort: Pulmonary effort is normal.      Breath sounds: Normal breath sounds.   Abdominal:      General: Bowel sounds are normal.      Palpations: Abdomen is soft.      Tenderness: There is abdominal tenderness. There is guarding.   Musculoskeletal:         General: Normal range of motion.      Cervical back: Normal range of motion. "   Skin:     General: Skin is warm and dry.   Neurological:      General: No focal deficit present.      Mental Status: She is alert and oriented to person, place, and time.   Psychiatric:         Mood and Affect: Mood normal.         Behavior: Behavior normal.          Fall Risk Assessment:  Fall Risk Assessment was completed, and patient is at low risk for falls.    Assessment & Plan    --She presents today with complaints of abdominal pain as well as constipation.  She states she started about 4 days.  She has a history of cancer and chemotherapy that she is currently taking.  She states she periodically has constipation anyway.  She is also recently been diagnosed with some diverticulitis.  She states she ate a meal of ground beef that she thinks triggered this episode of diverticulitis.  I will start her on a regimen of Augmentin twice daily for the next 10 days.  I will also send in some lactulose in case her constipation continues.  She currently takes Senokot as well as MiraLAX.  We also will draw labs for her oncologist at [facility] today's office.      Patient Active Problem List   Diagnosis   • Adenocarcinoma of lung (HCC)   • Acute diverticulitis   • Elevated LFTs   • Abscess of right lower extremity       ICD-10-CM ICD-9-CM   1. Acute diverticulitis  K57.92 562.11   2. Constipation, unspecified constipation type  K59.00 564.00     No orders of the defined types were placed in this encounter.      Meds Ordered During Visit:  New Medications Ordered This Visit   Medications   • amoxicillin-clavulanate (Augmentin) 875-125 MG per tablet     Sig: Take 1 tablet by mouth 2 (Two) Times a Day for 10 days.     Dispense:  20 tablet     Refill:  0   • lactulose (CHRONULAC) 10 GM/15ML solution     Sig: Take 30 mL by mouth 2 (Two) Times a Day As Needed (CONSTIPATION).     Dispense:  150 mL     Refill:  0       Follow up on file with Dr. Soto.             This document has been electronically signed by Amrit  CHEIKH Fontaine  December 19, 2022 09:25 EST    Amrit Fontaine, CHEIKH  [address] [place] [address] [phone] (office)    Part of this note may be an electronic transcription/translation of spoken language to printed text using the Dragon Dictation System.

## 2022-12-19 NOTE — PROGRESS NOTES
Venipuncture Blood Specimen Collection  Venipuncture performed in Left arm by Toña Deluca MA with good hemostasis. Patient tolerated the procedure well without complications.   12/19/22   Toña Deluca MA

## 2022-12-20 LAB
ALBUMIN SERPL-MCNC: 4 G/DL (ref 3.5–5.2)
ALBUMIN/GLOB SERPL: 1.7 G/DL
ALP SERPL-CCNC: 108 U/L (ref 39–117)
ALT SERPL W P-5'-P-CCNC: 32 U/L (ref 1–33)
ANION GAP SERPL CALCULATED.3IONS-SCNC: 7 MMOL/L (ref 5–15)
AST SERPL-CCNC: 33 U/L (ref 1–32)
BILIRUB SERPL-MCNC: 0.6 MG/DL (ref 0–1.2)
BUN SERPL-MCNC: 12 MG/DL (ref 8–23)
BUN/CREAT SERPL: 14.6 (ref 7–25)
CALCIUM SPEC-SCNC: 9.6 MG/DL (ref 8.6–10.5)
CHLORIDE SERPL-SCNC: 101 MMOL/L (ref 98–107)
CO2 SERPL-SCNC: 29 MMOL/L (ref 22–29)
CREAT SERPL-MCNC: 0.82 MG/DL (ref 0.57–1)
EGFRCR SERPLBLD CKD-EPI 2021: 79 ML/MIN/1.73
GLOBULIN UR ELPH-MCNC: 2.4 GM/DL
GLUCOSE SERPL-MCNC: 89 MG/DL (ref 65–99)
POTASSIUM SERPL-SCNC: 4.4 MMOL/L (ref 3.5–5.2)
PROT SERPL-MCNC: 6.4 G/DL (ref 6–8.5)
SODIUM SERPL-SCNC: 137 MMOL/L (ref 136–145)

## 2022-12-21 ENCOUNTER — CONSULT (OUTPATIENT)
Dept: ONCOLOGY | Facility: CLINIC | Age: 67
End: 2022-12-21

## 2022-12-21 VITALS
SYSTOLIC BLOOD PRESSURE: 139 MMHG | DIASTOLIC BLOOD PRESSURE: 85 MMHG | HEART RATE: 86 BPM | RESPIRATION RATE: 18 BRPM | WEIGHT: 212 LBS | BODY MASS INDEX: 35.32 KG/M2 | TEMPERATURE: 97.6 F | HEIGHT: 65 IN | OXYGEN SATURATION: 97 %

## 2022-12-21 DIAGNOSIS — C79.89 NON-SMALL CELL CARCINOMA OF LEFT LUNG METASTATIC TO ABDOMEN: Primary | ICD-10-CM

## 2022-12-21 DIAGNOSIS — C34.92 NON-SMALL CELL CARCINOMA OF LEFT LUNG METASTATIC TO ABDOMEN: Primary | ICD-10-CM

## 2022-12-21 PROCEDURE — 99205 OFFICE O/P NEW HI 60 MIN: CPT | Performed by: INTERNAL MEDICINE

## 2022-12-21 NOTE — PROGRESS NOTES
Subjective     Date: 12/21/2022    Referring Provider  Camila Soto DO    Chief Complaint  Non small cell lung adenocarcinoma, stage IV    Subjective          Savi Villalpando is a 67 y.o. female who presents today to [facility]EMAT[facility] for initial evaluation .    HPI:   67 year old F with history of Denovo metastatic lung adenocarcinoma, PD-L1 100%, guardant 360 with KIF5B-RET fusion, who has been started on selpercatinib at [facility].  She presents today to discuss ongoing pain medication and management.    Oncological history: Patient with persistent severe asthma, no improvement with inhalers, subacute decline in activity since July 2021.  On June 30, 2022 chest x-ray showed suspicious left lower lobe mass.  Follow-up CT chest on July 1, 2022 showed approximately 4.5 cm left lower lobe mass with mediastinal lymphadenopathy.  PET/CT July 29 showed FDG avid left lower lobe mass with contiguous involvement of hilar and contralateral mediastinal adenopathy.  Also noted to have FDG avid pericardial lymph nodes, mesenteric lymphadenopathy and left buttock lesion.  MRI brain was without any metastatic lesions.  Underwent biopsy of the left lower lobe, revealed invasive poorly differentiated adenocarcinoma consistent with lung primary.  Immunohistochemical stains demonstrated CK7, TTF-1 and Napsin a negative for p40, CK20, calretinin and WT1.  PD-L1 100%.  Guardant 360 was sent September 1, resulted in KIF5B-RET fusion postivity.  She was started on selpercatinib September 20, 2022 at the dose of 160 mg twice daily.  In the interim she has also undergone radiation therapy to the left lower lobe lung.  Her symptoms improved significantly, improvement in respiratory standard and breathing.  Medication was held for grade 3 LFT elevation. Dose reduced to 80 mg twice daily on 11/25/22.  In the interim she has moved to [place] farm.    She has also developed left lower quadrant  pain, CT abdomen pelvis which is done which shows acute diverticulitis.  She has been started on Augmentin and takes chronic pain medication to help tolerate with the abdominal pain.  Currently taking OxyContin 10 mg twice daily and oxycodone 5 mg every 4 hours.  She presents today for the management of pain medication, monthly refills.  She plans on following up with [facility] every 3 months, [facility] health appointments in between.    The following portions of the patient's history were reviewed and updated as appropriate: allergies, current medications, past family history, past medical history, past social history, past surgical history and problem list.    Objective     Objective     Allergy:   Allergies   Allergen Reactions   • Fluad Quadrivalent [Influenza Vac A&B Sa Adj Quad] Anaphylaxis   • Metronidazole Cough and Hives   • Multi Complete-Iron [Anti-Oxidant] Anaphylaxis     Cough uncontrollably, all fragrances   • Other Anaphylaxis     Cough uncontrollably, all fragrances   • Sertraline GI Intolerance, Nausea Only and Other (See Comments)     Severe diarrhea  Severe diarrhea  Severe diarrhea  Severe diarrhea     • Shellfish-Derived Products Anaphylaxis   • Sulfa Antibiotics Anaphylaxis        Current Medications:   Current Outpatient Medications   Medication Sig Dispense Refill   • albuterol (PROVENTIL) (2.5 MG/3ML) 0.083% nebulizer solution Take 2.5 mg by nebulization Every 4 (Four) Hours As Needed for Wheezing.     • amoxicillin-clavulanate (Augmentin) 875-125 MG per tablet Take 1 tablet by mouth 2 (Two) Times a Day for 10 days. 20 tablet 0   • benzonatate (TESSALON) 100 MG capsule Take 1 capsule by mouth 3 (Three) Times a Day As Needed for Cough.     • chlorpheniramine (CHLOR-TRIMETON) 4 MG tablet Take 1 tablet by mouth Every Night.     • Dextromethorphan-guaiFENesin (Mucinex DM Maximum Strength)  MG tablet sustained-release 12 hour Take  by mouth As Needed.     • dry mouth  gel (BIOTENE ORALBALANCE) gel Apply  to the mouth or throat As Needed for Dry Mouth.     • Hypertonic Nasal Wash (Sinus Rinse) pack into the nostril(s) as directed by provider.     • lactulose (CHRONULAC) 10 GM/15ML solution Take 30 mL by mouth 2 (Two) Times a Day As Needed (CONSTIPATION). 150 mL 0   • LEVALBUTEROL TARTRATE HFA IN Inhale.     • Melatonin 10 MG tablet Take  by mouth.     • mometasone-formoterol (DULERA 200) 200-5 MCG/ACT inhaler Inhale 2 puffs 2 (Two) Times a Day. Pt currently taking once per day     • ondansetron (Zofran) 4 MG tablet Take 1 tablet by mouth Every 8 (Eight) Hours As Needed for Nausea or Vomiting. 40 tablet 2   • oxyCODONE (OXY-IR) 5 MG capsule Take 1 capsule by mouth Every 4 (Four) Hours As Needed for Moderate Pain. Pt taking every 8hrs     • oxyCODONE (oxyCONTIN) 10 MG 12 hr tablet Take 1 tablet by mouth Every 12 (Twelve) Hours.     • polyethylene glycol (MIRALAX) 17 g packet Take 17 g by mouth Daily.     • pseudoephedrine (SUDAFED) 120 MG 12 hr tablet Take 1 tablet by mouth Daily As Needed for Congestion.     • Selpercatinib (RETEVMO) 80 MG capsule Take 80 mg by mouth 2 (Two) Times a Day.     • sennosides-docusate (PERICOLACE) 8.6-50 MG per tablet Take 1 tablet by mouth 2 (Two) Times a Day.     • sodium chloride 3 % nebulizer solution Take 4 mL by nebulization As Needed for Cough.     • vitamin E 72279 units external oil Apply  topically to the appropriate area as directed Daily.     • Wound Cleansers (WOUND WASH EX) Apply  topically.       No current facility-administered medications for this visit.       Past Medical History:  Past Medical History:   Diagnosis Date   • Allergic 1956   • Allergies    • Anxiety 1970   • Arthritis 2010   • Asthma 1956   • Cholelithiasis 1976   • Diverticulitis    • Elevated liver enzymes    • HL (hearing loss) 2011   • Lung cancer (HCC)     stage 4   • Obesity 1997   • Rib fracture        Past Surgical History:  Past Surgical History:   Procedure  "Laterality Date   • BILATERAL BREAST REDUCTION      3 lb removal   • BREAST SURGERY  2021   • CERVICAL FUSION      C567 partial 8   • CHOLECYSTECTOMY     • COSMETIC SURGERY  2021   • EYE SURGERY  2000   • HAND SURGERY      x3   • HYSTERECTOMY Bilateral     total   • LYMPH NODE DISSECTION      back of neck   • TRIGGER FINGER RELEASE         Social History:  Social History     Socioeconomic History   • Marital status:    Tobacco Use   • Smoking status: Never   • Smokeless tobacco: Never   • Tobacco comments:     Both parents smoked   Vaping Use   • Vaping Use: Never used   Substance and Sexual Activity   • Alcohol use: Never   • Drug use: Never   • Sexual activity: Not Currently     Partners: Male     Birth control/protection: Post-menopausal, Hysterectomy     Saiv Villalpando  reports that she has never smoked. She has never used smokeless tobacco.      Family History:  Family History   Problem Relation Age of Onset   • Arthritis Mother    • Mental illness Mother    • Heart disease Father    • Asthma Maternal Grandmother    • Hearing loss Maternal Grandmother    • Alcohol abuse Sister    • Cancer Maternal Uncle    • Miscarriages / Stillbirths Daughter        Review of Systems:  Review of Systems   Constitutional: Positive for fatigue.   HENT: Negative.    Eyes: Negative.    Respiratory: Negative.    Cardiovascular: Negative.    Gastrointestinal: Negative.    Endocrine: Negative.    Genitourinary: Negative.    Musculoskeletal: Negative.    Skin: Negative.    Neurological: Negative.    Hematological: Negative.    Psychiatric/Behavioral: Negative.        Vital Signs:   /85   Pulse 86   Temp 97.6 °F (36.4 °C)   Resp 18   Ht 165.1 cm (65\")   Wt 96.2 kg (212 lb)   SpO2 97%   BMI 35.28 kg/m²      Physical Exam:  Physical Exam  Constitutional:       Appearance: Normal appearance.   HENT:      Head: Normocephalic and atraumatic.      Mouth/Throat:      Mouth: Mucous membranes are moist.      Pharynx: " "Oropharynx is clear.   Eyes:      Extraocular Movements: Extraocular movements intact.      Pupils: Pupils are equal, round, and reactive to light.   Cardiovascular:      Rate and Rhythm: Normal rate and regular rhythm.   Pulmonary:      Effort: Pulmonary effort is normal. No respiratory distress.      Breath sounds: Normal breath sounds. No wheezing.   Abdominal:      General: Abdomen is flat.      Palpations: Abdomen is soft.   Musculoskeletal:         General: Normal range of motion.   Skin:     General: Skin is warm and dry.   Neurological:      General: No focal deficit present.      Mental Status: She is alert and oriented to person, place, and time.   Psychiatric:         Thought Content: Thought content normal.         Judgment: Judgment normal.      Comments: +intermittently teary during the exam          PHQ-9 Score  PHQ-9 Total Score:       Pain Score  Vitals:    12/21/22 0856   BP: 139/85   Pulse: 86   Resp: 18   Temp: 97.6 °F (36.4 °C)   SpO2: 97%   Weight: 96.2 kg (212 lb)   Height: 165.1 cm (65\")   PainSc: 0-No pain       ECOG score: 0             Lab Review  Lab Results   Component Value Date    WBC 7 11/28/2022    HGB 13.1 11/28/2022    HCT 42.6 11/28/2022    MCV 86.1 11/28/2022    RDW 14.8 11/28/2022     11/28/2022    NEUTRORELPCT 67.9 11/07/2022    LYMPHORELPCT 19.0 (L) 11/07/2022    MONORELPCT 8.3 11/07/2022    EOSRELPCT 3.5 11/07/2022    BASORELPCT 1.0 11/07/2022    NEUTROABS 5.04 11/28/2022    LYMPHSABS 1.21 11/28/2022       Lab Results   Component Value Date     12/19/2022    K 4.4 12/19/2022    CO2 29.0 12/19/2022     12/19/2022    BUN 12 12/19/2022    CREATININE 0.82 12/19/2022    GLUCOSE 89 12/19/2022    CALCIUM 9.6 12/19/2022    ALKPHOS 108 12/19/2022    AST 33 (H) 12/19/2022    ALT 32 12/19/2022    BILITOT 0.6 12/19/2022    ALBUMIN 4.00 12/19/2022    PROTEINTOT 6.4 12/19/2022    MG 2.4 10/25/2022       Radiology Results   PET/CT 11/28/2022:  FINDINGS:       Significant " interval response to therapy. Primary lesion in the left lower lobe has markedly   decreased in size measuring approximately 2.2 cm in greatest dimension compared to 5.3 cm   previously. There is also decreased FDG uptake with SUV max 7.1, previously 28.9, and decreased   metabolic volume.     Left hilar lymph nodes have essentially completely resolved. There is trace uptake within the left   hilum SUV max 4.5, and minimal FDG uptake within a right paratracheal node (image 87) with SUV max   3.7, not previously FDG avid.     New acute fracture of the left lateral 7th rib with a small adjacent hematoma and trace expected FDG   uptake. Healing fracture of the right lateral 5th rib.     Presumed inflammatory subcutaneous nodule in the left posterior soft tissues is no longer present.     Previously seen upper retroperitoneal lymph node has resolved.     No new FDG evidence of more distant metastatic disease.     Incidental findings on low-dose noncontrast CT: Cervical fusion. Fat-containing umbilical hernia.   Colonic diverticulosis. Hysterectomy. Degenerative changes of the spine. Lipoma in the left   abdominal wall.      Assessment / Plan         Assessment and Plan   67-year-old female with metastatic non-small cell lung adenocarcinoma,  PD-L1 100% and guardant 360 with KIF5B-RET fusion protein, who has been on selpercatinib since September 2022.  Currently being cared for and treated at [facility] in [place], [place].  She presented today to discuss pain management.    We discussed referring patient to pain management to handle the chronic pain medications and narcotics, including oxycodone and OxyContin.  If in the future patient requires additional assistant with oncological management, I would be happy to assist.  Patient is in agreement; I will refer patient to our local pain management clinic.    Discussed possible differential diagnoses, testing, treatment, recommended non-pharmacological interventions,  risks, warning signs to monitor for that would indicate need for follow-up in clinic or ER. If no improvement with these regimens or you have new or worsening symptoms follow-up. Patient verbalizes understanding and agreement with plan of care. Denies further needs or concerns.     Patient was given instructions and counseling regarding her condition and for health maintenance advised.    I spent 60 minutes with Savi Villalpando today.  In the office today, more than 50% of this time was spent face-to-face with her  in counseling / coordination of care, reviewing her interim medical history and counseling on the current treatment plan.  All questions were answered to her satisfaction.      Meds ordered during this visit  No orders of the defined types were placed in this encounter.      Visit Diagnoses    ICD-10-CM ICD-9-CM   1. Non-small cell carcinoma of left lung metastatic to abdomen (HCC)  C34.92 162.9    C79.89 198.89       Follow Up   PRN      This document has been electronically signed by Jena Saucedo MD   December 21, 2022 16:43 EST    Dictated Utilizing Dragon Dictation: Part of this note may be an electronic transcription/translation of spoken language to printed text using the Dragon Dictation System.

## 2022-12-27 ENCOUNTER — PATIENT ROUNDING (BHMG ONLY) (OUTPATIENT)
Dept: ONCOLOGY | Facility: CLINIC | Age: 67
End: 2022-12-27

## 2022-12-27 ENCOUNTER — CLINICAL SUPPORT (OUTPATIENT)
Dept: FAMILY MEDICINE CLINIC | Facility: CLINIC | Age: 67
End: 2022-12-27

## 2022-12-27 DIAGNOSIS — C34.92 MALIGNANT NEOPLASM OF LEFT LUNG, UNSPECIFIED PART OF LUNG: ICD-10-CM

## 2022-12-27 PROCEDURE — 36415 COLL VENOUS BLD VENIPUNCTURE: CPT | Performed by: INTERNAL MEDICINE

## 2022-12-27 PROCEDURE — 80053 COMPREHEN METABOLIC PANEL: CPT | Performed by: STUDENT IN AN ORGANIZED HEALTH CARE EDUCATION/TRAINING PROGRAM

## 2022-12-27 NOTE — PROGRESS NOTES
Venipuncture Blood Specimen Collection  Venipuncture performed in RIGHT ARM by Viry Maldonado RN with good hemostasis. Patient tolerated the procedure well without complications.   12/27/22   Viry Maldonado RN

## 2022-12-28 LAB
ALBUMIN SERPL-MCNC: 4.3 G/DL (ref 3.5–5.2)
ALBUMIN/GLOB SERPL: 1.7 G/DL
ALP SERPL-CCNC: 109 U/L (ref 39–117)
ALT SERPL W P-5'-P-CCNC: 27 U/L (ref 1–33)
ANION GAP SERPL CALCULATED.3IONS-SCNC: 9 MMOL/L (ref 5–15)
AST SERPL-CCNC: 29 U/L (ref 1–32)
BILIRUB SERPL-MCNC: 0.2 MG/DL (ref 0–1.2)
BUN SERPL-MCNC: 12 MG/DL (ref 8–23)
BUN/CREAT SERPL: 14.3 (ref 7–25)
CALCIUM SPEC-SCNC: 10 MG/DL (ref 8.6–10.5)
CHLORIDE SERPL-SCNC: 102 MMOL/L (ref 98–107)
CO2 SERPL-SCNC: 30 MMOL/L (ref 22–29)
CREAT SERPL-MCNC: 0.84 MG/DL (ref 0.57–1)
EGFRCR SERPLBLD CKD-EPI 2021: 76.8 ML/MIN/1.73
GLOBULIN UR ELPH-MCNC: 2.6 GM/DL
GLUCOSE SERPL-MCNC: 82 MG/DL (ref 65–99)
POTASSIUM SERPL-SCNC: 4.6 MMOL/L (ref 3.5–5.2)
PROT SERPL-MCNC: 6.9 G/DL (ref 6–8.5)
SODIUM SERPL-SCNC: 141 MMOL/L (ref 136–145)

## 2022-12-30 ENCOUNTER — HOSPITAL ENCOUNTER (OUTPATIENT)
Dept: MAMMOGRAPHY | Facility: HOSPITAL | Age: 67
Discharge: HOME | End: 2022-12-30

## 2022-12-30 ENCOUNTER — HOSPITAL ENCOUNTER (OUTPATIENT)
Dept: BONE DENSITY | Facility: HOSPITAL | Age: 67
Discharge: HOME | End: 2022-12-30

## 2022-12-30 DIAGNOSIS — Z12.31 ENCOUNTER FOR SCREENING MAMMOGRAM FOR MALIGNANT NEOPLASM OF BREAST: ICD-10-CM

## 2022-12-30 DIAGNOSIS — Z78.0 POSTMENOPAUSAL: ICD-10-CM

## 2022-12-30 PROCEDURE — 77063 BREAST TOMOSYNTHESIS BI: CPT

## 2022-12-30 PROCEDURE — 77080 DXA BONE DENSITY AXIAL: CPT | Performed by: RADIOLOGY

## 2022-12-30 PROCEDURE — 77067 SCR MAMMO BI INCL CAD: CPT

## 2022-12-30 PROCEDURE — 77067 SCR MAMMO BI INCL CAD: CPT | Performed by: RADIOLOGY

## 2022-12-30 PROCEDURE — 77080 DXA BONE DENSITY AXIAL: CPT

## 2022-12-30 PROCEDURE — 77063 BREAST TOMOSYNTHESIS BI: CPT | Performed by: RADIOLOGY

## 2023-01-03 ENCOUNTER — CLINICAL SUPPORT (OUTPATIENT)
Dept: FAMILY MEDICINE CLINIC | Facility: CLINIC | Age: 68
End: 2023-01-03
Payer: MEDICARE

## 2023-01-03 DIAGNOSIS — C34.92 MALIGNANT NEOPLASM OF LEFT LUNG, UNSPECIFIED PART OF LUNG: ICD-10-CM

## 2023-01-03 PROCEDURE — 36415 COLL VENOUS BLD VENIPUNCTURE: CPT | Performed by: INTERNAL MEDICINE

## 2023-01-03 PROCEDURE — 80053 COMPREHEN METABOLIC PANEL: CPT | Performed by: STUDENT IN AN ORGANIZED HEALTH CARE EDUCATION/TRAINING PROGRAM

## 2023-01-03 NOTE — PROGRESS NOTES
Venipuncture Blood Specimen Collection  Venipuncture performed in Right arm by Toña Deluca MA with good hemostasis. Patient tolerated the procedure well without complications.   01/03/23   Toña Deluca MA

## 2023-01-04 LAB
ALBUMIN SERPL-MCNC: 4 G/DL (ref 3.5–5.2)
ALBUMIN/GLOB SERPL: 1.4 G/DL
ALP SERPL-CCNC: 107 U/L (ref 39–117)
ALT SERPL W P-5'-P-CCNC: 26 U/L (ref 1–33)
ANION GAP SERPL CALCULATED.3IONS-SCNC: 11.6 MMOL/L (ref 5–15)
AST SERPL-CCNC: 29 U/L (ref 1–32)
BILIRUB SERPL-MCNC: 0.3 MG/DL (ref 0–1.2)
BUN SERPL-MCNC: 10 MG/DL (ref 8–23)
BUN/CREAT SERPL: 11.9 (ref 7–25)
CALCIUM SPEC-SCNC: 9.9 MG/DL (ref 8.6–10.5)
CHLORIDE SERPL-SCNC: 102 MMOL/L (ref 98–107)
CO2 SERPL-SCNC: 27.4 MMOL/L (ref 22–29)
CREAT SERPL-MCNC: 0.84 MG/DL (ref 0.57–1)
EGFRCR SERPLBLD CKD-EPI 2021: 76.8 ML/MIN/1.73
GLOBULIN UR ELPH-MCNC: 2.9 GM/DL
GLUCOSE SERPL-MCNC: 91 MG/DL (ref 65–99)
POTASSIUM SERPL-SCNC: 4.2 MMOL/L (ref 3.5–5.2)
PROT SERPL-MCNC: 6.9 G/DL (ref 6–8.5)
SODIUM SERPL-SCNC: 141 MMOL/L (ref 136–145)

## 2023-01-11 ENCOUNTER — OFFICE VISIT (OUTPATIENT)
Dept: FAMILY MEDICINE CLINIC | Facility: CLINIC | Age: 68
End: 2023-01-11
Payer: MEDICARE

## 2023-01-11 VITALS
BODY MASS INDEX: 35.06 KG/M2 | HEART RATE: 82 BPM | HEIGHT: 65 IN | WEIGHT: 210.4 LBS | TEMPERATURE: 97.7 F | SYSTOLIC BLOOD PRESSURE: 154 MMHG | DIASTOLIC BLOOD PRESSURE: 92 MMHG | OXYGEN SATURATION: 98 %

## 2023-01-11 DIAGNOSIS — Z00.00 HEALTH CARE MAINTENANCE: ICD-10-CM

## 2023-01-11 DIAGNOSIS — Z00.00 ENCOUNTER FOR WELLNESS EXAMINATION: Primary | ICD-10-CM

## 2023-01-11 DIAGNOSIS — R11.0 NAUSEA: ICD-10-CM

## 2023-01-11 DIAGNOSIS — C34.92 ADENOCARCINOMA OF LEFT LUNG: Chronic | ICD-10-CM

## 2023-01-11 DIAGNOSIS — R10.32 LLQ ABDOMINAL PAIN: ICD-10-CM

## 2023-01-11 PROCEDURE — 36415 COLL VENOUS BLD VENIPUNCTURE: CPT | Performed by: INTERNAL MEDICINE

## 2023-01-11 PROCEDURE — 90677 PCV20 VACCINE IM: CPT | Performed by: INTERNAL MEDICINE

## 2023-01-11 PROCEDURE — 80061 LIPID PANEL: CPT | Performed by: INTERNAL MEDICINE

## 2023-01-11 PROCEDURE — G0439 PPPS, SUBSEQ VISIT: HCPCS | Performed by: INTERNAL MEDICINE

## 2023-01-11 PROCEDURE — G0009 ADMIN PNEUMOCOCCAL VACCINE: HCPCS | Performed by: INTERNAL MEDICINE

## 2023-01-11 PROCEDURE — 85027 COMPLETE CBC AUTOMATED: CPT | Performed by: INTERNAL MEDICINE

## 2023-01-11 PROCEDURE — 99214 OFFICE O/P EST MOD 30 MIN: CPT | Performed by: INTERNAL MEDICINE

## 2023-01-11 PROCEDURE — 80053 COMPREHEN METABOLIC PANEL: CPT | Performed by: INTERNAL MEDICINE

## 2023-01-11 PROCEDURE — 1159F MED LIST DOCD IN RCRD: CPT | Performed by: INTERNAL MEDICINE

## 2023-01-11 PROCEDURE — 1170F FXNL STATUS ASSESSED: CPT | Performed by: INTERNAL MEDICINE

## 2023-01-11 PROCEDURE — 83735 ASSAY OF MAGNESIUM: CPT | Performed by: INTERNAL MEDICINE

## 2023-01-11 RX ORDER — SENNA PLUS 8.6 MG/1
8.6 TABLET ORAL
COMMUNITY

## 2023-01-11 RX ORDER — FLUTICASONE PROPIONATE AND SALMETEROL 250; 50 UG/1; UG/1
1 POWDER RESPIRATORY (INHALATION)
COMMUNITY
Start: 2022-12-29 | End: 2023-01-11

## 2023-01-11 RX ORDER — FLUTICASONE PROPIONATE AND SALMETEROL 250; 50 UG/1; UG/1
POWDER RESPIRATORY (INHALATION)
COMMUNITY
Start: 2022-12-30

## 2023-01-11 RX ORDER — ONDANSETRON 4 MG/1
4 TABLET, FILM COATED ORAL 4 TIMES DAILY PRN
Qty: 120 TABLET | Refills: 2 | Status: SHIPPED | OUTPATIENT
Start: 2023-01-11

## 2023-01-11 NOTE — PROGRESS NOTES
"The ABCs of the Annual Wellness Visit  Subsequent Medicare Wellness Visit    Subjective    {Wrapup  Review (Popup)  Advance Care Planning  Labs  CC  Problem List  Visit Diagnosis  Medications  Result Review  Imaging  [facility]  [facility]  [facility]  SnapShot  Encounters  Notes  Media  Procedures :23}  Savi Villalpando is a 67 y.o. female who presents for a Subsequent Medicare Wellness Visit.    The following portions of the patient's history were reviewed and   updated as appropriate: {history reviewed:20406::\"allergies\",\"current medications\",\"past family history\",\"past medical history\",\"past social history\",\"past surgical history\",\"problem list\"}.    Compared to one year ago, the patient feels her physical   health is {better worse same:90816}.    Compared to one year ago, the patient feels her mental   health is {better worse same:24702}.    Recent Hospitalizations:  {Hospital Admission Status in the last 365 days:43295}      Current Medical Providers:  Patient Care Team:  Camila Soto DO as PCP - General (Family Medicine)    Outpatient Medications Prior to Visit   Medication Sig Dispense Refill   • albuterol (PROVENTIL) (2.5 MG/3ML) 0.083% nebulizer solution Take 2.5 mg by nebulization 3 (Three) Times a Week.     • Fluticasone-Salmeterol (ADVAIR/WIXELA) 250-50 MCG/ACT DISKUS INHALE 1 PUFF BY MOUTH TWICE DAILY, RINSE MOUTH WITH WATER AFTER USE TO REDUCE AFTERTASTE AND INCIDENCE OF CANDIDIASIS, DO NOT SWALLOW     • Fluticasone-Salmeterol (ADVAIR/WIXELA) 250-50 MCG/ACT DISKUS Inhale 1 puff.     • ondansetron (Zofran) 4 MG tablet Take 1 tablet by mouth Every 8 (Eight) Hours As Needed for Nausea or Vomiting. 40 tablet 2   • oxyCODONE (OXY-IR) 5 MG capsule Take 1 capsule by mouth Every 4 (Four) Hours As Needed for Moderate Pain. Pt taking every 8hrs     • polyethylene glycol (MIRALAX) 17 g packet Take 17 g by mouth Daily.     • Selpercatinib (RETEVMO) 80 MG capsule Take 80 mg by " mouth 2 (Two) Times a Day.     • senna (SENOKOT) 8.6 MG tablet Take 8.6 mg by mouth.     • benzonatate (TESSALON) 100 MG capsule Take 1 capsule by mouth 3 (Three) Times a Day As Needed for Cough.     • chlorpheniramine (CHLOR-TRIMETON) 4 MG tablet Take 1 tablet by mouth Every Night.     • Dextromethorphan-guaiFENesin (Mucinex DM Maximum Strength)  MG tablet sustained-release 12 hour Take  by mouth As Needed.     • dry mouth gel (BIOTENE ORALBALANCE) gel Apply  to the mouth or throat As Needed for Dry Mouth.     • Hypertonic Nasal Wash (Sinus Rinse) pack into the nostril(s) as directed by provider.     • lactulose (CHRONULAC) 10 GM/15ML solution Take 30 mL by mouth 2 (Two) Times a Day As Needed (CONSTIPATION). 150 mL 0   • LEVALBUTEROL TARTRATE HFA IN Inhale.     • Melatonin 10 MG tablet Take  by mouth.     • mometasone-formoterol (DULERA 200) 200-5 MCG/ACT inhaler Inhale 2 puffs 2 (Two) Times a Day. Pt currently taking once per day     • oxyCODONE (oxyCONTIN) 10 MG 12 hr tablet Take 1 tablet by mouth Every 12 (Twelve) Hours.     • pseudoephedrine (SUDAFED) 120 MG 12 hr tablet Take 1 tablet by mouth Daily As Needed for Congestion.     • sennosides-docusate (PERICOLACE) 8.6-50 MG per tablet Take 1 tablet by mouth 2 (Two) Times a Day.     • sodium chloride 3 % nebulizer solution Take 4 mL by nebulization As Needed for Cough.     • vitamin E 99515 units external oil Apply  topically to the appropriate area as directed Daily.     • Wound Cleansers (WOUND WASH EX) Apply  topically.       No facility-administered medications prior to visit.       Opioid medication/s are on active medication list.  and I have evaluated her active treatment plan and pain score trends (see table).  Vitals:    01/11/23 0958   PainSc:   3   PainLoc: Abdomen     I have reviewed the chart for potential of high risk medication and harmful drug interactions in the elderly.            Aspirin is not on active medication list.  {ASPIRIN NOT ON  "MEDICATION LIST INDICATED/NOT INDICATED:17176}.    Patient Active Problem List   Diagnosis   • Adenocarcinoma of lung (HCC)   • Acute diverticulitis   • Elevated LFTs   • Abscess of right lower extremity     Advance Care Planning  Advance Directive is not on file.  {ACP Discussion, Advance Directive not in EMR:18651}     Objective    Vitals:    01/11/23 0958   BP: 154/92   BP Location: Right arm   Patient Position: Sitting   Cuff Size: Adult   Pulse: 82   Temp: 97.7 °F (36.5 °C)   TempSrc: Temporal   SpO2: 98%   Weight: 95.4 kg (210 lb 6.4 oz)   Height: 165.1 cm (65\")   PainSc:   3   PainLoc: Abdomen     Estimated body mass index is 35.01 kg/m² as calculated from the following:    Height as of this encounter: 165.1 cm (65\").    Weight as of this encounter: 95.4 kg (210 lb 6.4 oz).    {Class 2 Severe Obesity (BMI >=35 and <=39.9.:9770546672}      Does the patient have evidence of cognitive impairment?   {Yes/No:71020}            HEALTH RISK ASSESSMENT    Smoking Status:  Social History     Tobacco Use   Smoking Status Never   Smokeless Tobacco Never   Tobacco Comments    Both parents smoked     Alcohol Consumption:  Social History     Substance and Sexual Activity   Alcohol Use Never     Fall Risk Screen:    STEADI Fall Risk Assessment was completed, and patient is at LOW risk for falls.Assessment completed on:1/11/2023    Depression Screening:  PHQ-2/PHQ-9 Depression Screening 1/11/2023   Little Interest or Pleasure in Doing Things 0-->not at all   Feeling Down, Depressed or Hopeless 0-->not at all   PHQ-9: Brief Depression Severity Measure Score 0       Health Habits and Functional and Cognitive Screening:  Functional & Cognitive Status 1/11/2023   Do you have difficulty preparing food and eating? No   Do you have difficulty bathing yourself, getting dressed or grooming yourself? No   Do you have difficulty using the toilet? No   Do you have difficulty moving around from place to place? No   Do you have trouble " with steps or getting out of a bed or a chair? No   Current Diet Well Balanced Diet   Dental Exam Not up to date   Eye Exam Up to date   Exercise (times per week) 7 times per week   Current Exercises Include Walking   Do you need help using the phone?  No   Are you deaf or do you have serious difficulty hearing?  No   Do you need help with transportation? No   Do you need help shopping? No   Do you need help preparing meals?  No   Do you need help with housework?  No   Do you need help with laundry? No   Do you need help taking your medications? No   Do you need help managing money? No   Do you ever drive or ride in a car without wearing a seat belt? No   Have you felt unusual stress, anger or loneliness in the last month? Yes   Who do you live with? Spouse   If you need help, do you have trouble finding someone available to you? No   Have you been bothered in the last four weeks by sexual problems? No   Do you have difficulty concentrating, remembering or making decisions? No       Age-appropriate Screening Schedule:  Refer to the list below for future screening recommendations based on patient's age, sex and/or medical conditions. Orders for these recommended tests are listed in the plan section. The patient has been provided with a written plan.    Health Maintenance   Topic Date Due   • ZOSTER VACCINE (3 of 3) 12/20/2018   • TDAP/TD VACCINES (2 - Td or Tdap) 02/17/2021   • MAMMOGRAM  12/30/2024   • DXA SCAN  12/30/2024   • INFLUENZA VACCINE  Discontinued                CMS Preventative Services Quick Reference  Risk Factors Identified During Encounter:    {Medicare Wellness Risk Factors:11164}    The above risks/problems have been discussed with the patient.  Pertinent information has been shared with the patient in the After Visit Summary.    There are no diagnoses linked to this encounter.    Follow Up:   Next Medicare Wellness visit to be scheduled in 1 year.      An After Visit Summary and PPPS were made  available to the patient.

## 2023-01-11 NOTE — PROGRESS NOTES
The ABCs of the Annual Wellness Visit  Subsequent Medicare Wellness Visit    Subjective    Savi Villalpando is a 67 y.o. female who presents for a Subsequent Medicare Wellness Visit.    The following portions of the patient's history were reviewed and   updated as appropriate: allergies, current medications, past family history, past medical history, past social history, past surgical history and problem list.    Compared to one year ago, the patient feels her physical   health is better.    Compared to one year ago, the patient feels her mental   health is better.    Recent Hospitalizations:  She was not admitted to the hospital during the last year.       Current Medical Providers:  Patient Care Team:  Camila Soto DO as PCP - General (Family Medicine)    Outpatient Medications Prior to Visit   Medication Sig Dispense Refill   • albuterol (PROVENTIL) (2.5 MG/3ML) 0.083% nebulizer solution Take 2.5 mg by nebulization 3 (Three) Times a Week.     • Fluticasone-Salmeterol (ADVAIR/WIXELA) 250-50 MCG/ACT DISKUS INHALE 1 PUFF BY MOUTH TWICE DAILY, RINSE MOUTH WITH WATER AFTER USE TO REDUCE AFTERTASTE AND INCIDENCE OF CANDIDIASIS, DO NOT SWALLOW     • oxyCODONE (OXY-IR) 5 MG capsule Take 1 capsule by mouth Every 4 (Four) Hours As Needed for Moderate Pain. Pt taking every 8hrs     • polyethylene glycol (MIRALAX) 17 g packet Take 17 g by mouth Daily.     • Selpercatinib (RETEVMO) 80 MG capsule Take 80 mg by mouth 2 (Two) Times a Day.     • senna (SENOKOT) 8.6 MG tablet Take 8.6 mg by mouth.     • Fluticasone-Salmeterol (ADVAIR/WIXELA) 250-50 MCG/ACT DISKUS Inhale 1 puff.     • ondansetron (Zofran) 4 MG tablet Take 1 tablet by mouth Every 8 (Eight) Hours As Needed for Nausea or Vomiting. 40 tablet 2   • benzonatate (TESSALON) 100 MG capsule Take 1 capsule by mouth 3 (Three) Times a Day As Needed for Cough.     • chlorpheniramine (CHLOR-TRIMETON) 4 MG tablet Take 1 tablet by mouth Every Night.     •  Dextromethorphan-guaiFENesin (Mucinex DM Maximum Strength)  MG tablet sustained-release 12 hour Take  by mouth As Needed.     • dry mouth gel (BIOTENE ORALBALANCE) gel Apply  to the mouth or throat As Needed for Dry Mouth.     • Hypertonic Nasal Wash (Sinus Rinse) pack into the nostril(s) as directed by provider.     • LEVALBUTEROL TARTRATE HFA IN Inhale.     • Melatonin 10 MG tablet Take  by mouth.     • oxyCODONE (oxyCONTIN) 10 MG 12 hr tablet Take 1 tablet by mouth Every 12 (Twelve) Hours.     • pseudoephedrine (SUDAFED) 120 MG 12 hr tablet Take 1 tablet by mouth Daily As Needed for Congestion.     • sennosides-docusate (PERICOLACE) 8.6-50 MG per tablet Take 1 tablet by mouth 2 (Two) Times a Day.     • sodium chloride 3 % nebulizer solution Take 4 mL by nebulization As Needed for Cough.     • vitamin E 99835 units external oil Apply  topically to the appropriate area as directed Daily.     • Wound Cleansers (WOUND WASH EX) Apply  topically.     • lactulose (CHRONULAC) 10 GM/15ML solution Take 30 mL by mouth 2 (Two) Times a Day As Needed (CONSTIPATION). 150 mL 0   • mometasone-formoterol (DULERA 200) 200-5 MCG/ACT inhaler Inhale 2 puffs 2 (Two) Times a Day. Pt currently taking once per day       No facility-administered medications prior to visit.       Opioid medication/s are on active medication list.  and I have evaluated her active treatment plan and pain score trends (see table).  Vitals:    01/11/23 0958   PainSc:   3   PainLoc: Abdomen     I have reviewed the chart for potential of high risk medication and harmful drug interactions in the elderly.            Aspirin is not on active medication list.  Aspirin use is not indicated based on review of current medical condition/s. Risk of harm outweighs potential benefits.      Patient Active Problem List   Diagnosis   • Adenocarcinoma of lung (HCC)   • Acute diverticulitis   • Elevated LFTs   • Abscess of right lower extremity   • LLQ abdominal pain  "    Advance Care Planning  Advance Directive is not on file.  ACP discussion was held with the patient during this visit. Patient has an advance directive (not in EMR), copy requested.     Objective    Vitals:    01/11/23 0958   BP: 154/92   BP Location: Right arm   Patient Position: Sitting   Cuff Size: Adult   Pulse: 82   Temp: 97.7 °F (36.5 °C)   TempSrc: Temporal   SpO2: 98%   Weight: 95.4 kg (210 lb 6.4 oz)   Height: 165.1 cm (65\")   PainSc:   3   PainLoc: Abdomen     Estimated body mass index is 35.01 kg/m² as calculated from the following:    Height as of this encounter: 165.1 cm (65\").    Weight as of this encounter: 95.4 kg (210 lb 6.4 oz).      Does the patient have evidence of cognitive impairment? No          HEALTH RISK ASSESSMENT    Smoking Status:  Social History     Tobacco Use   Smoking Status Never   Smokeless Tobacco Never   Tobacco Comments    Both parents smoked     Alcohol Consumption:  Social History     Substance and Sexual Activity   Alcohol Use Never     Fall Risk Screen:    STEADI Fall Risk Assessment was completed, and patient is at LOW risk for falls.Assessment completed on:1/11/2023    Depression Screening:  PHQ-2/PHQ-9 Depression Screening 1/11/2023   Little Interest or Pleasure in Doing Things 0-->not at all   Feeling Down, Depressed or Hopeless 0-->not at all   PHQ-9: Brief Depression Severity Measure Score 0       Health Habits and Functional and Cognitive Screening:  Functional & Cognitive Status 1/11/2023   Do you have difficulty preparing food and eating? No   Do you have difficulty bathing yourself, getting dressed or grooming yourself? No   Do you have difficulty using the toilet? No   Do you have difficulty moving around from place to place? No   Do you have trouble with steps or getting out of a bed or a chair? No   Current Diet Well Balanced Diet   Dental Exam Not up to date   Eye Exam Up to date   Exercise (times per week) 7 times per week   Current Exercises Include " Walking   Do you need help using the phone?  No   Are you deaf or do you have serious difficulty hearing?  No   Do you need help with transportation? No   Do you need help shopping? No   Do you need help preparing meals?  No   Do you need help with housework?  No   Do you need help with laundry? No   Do you need help taking your medications? No   Do you need help managing money? No   Do you ever drive or ride in a car without wearing a seat belt? No   Have you felt unusual stress, anger or loneliness in the last month? Yes   Who do you live with? Spouse   If you need help, do you have trouble finding someone available to you? No   Have you been bothered in the last four weeks by sexual problems? No   Do you have difficulty concentrating, remembering or making decisions? No       Age-appropriate Screening Schedule:  Refer to the list below for future screening recommendations based on patient's age, sex and/or medical conditions. Orders for these recommended tests are listed in the plan section. The patient has been provided with a written plan.    Health Maintenance   Topic Date Due   • ZOSTER VACCINE (3 of 3) 12/20/2018   • TDAP/TD VACCINES (2 - Td or Tdap) 02/17/2021   • MAMMOGRAM  12/30/2024   • DXA SCAN  12/30/2024   • INFLUENZA VACCINE  Discontinued                CMS Preventative Services Quick Reference  Risk Factors Identified During Encounter  Immunizations Discussed/Encouraged: Tdap, Prevnar 20 (Pneumococcal 20-valent conjugate), Shingrix and COVID19. We will update Prevnar today. She will discuss getting Shingrix at the pharmacy. She had shortness of air with Covid vaccines.     Age appropriate screenings were discussed with patient today. She is up to date on mammogram. We will update metabolic screenings today. She is up to date on cologuard. She is continuing to follow closely with her oncologist at [facility].     The above risks/problems have been discussed with the patient.  Pertinent information has been  "shared with the patient in the After Visit Summary.  An After Visit Summary and PPPS were made available to the patient.    Follow Up:   Next Medicare Wellness visit to be scheduled in 1 year. We will plan on a 1 month office follow up.      Additional E&M Note during same encounter follows:  Patient has multiple medical problems which are significant and separately identifiable that require additional work above and beyond the Medicare Wellness Visit.      Chief Complaint  Follow-up and Medicare Wellness-subsequent    Subjective        HPI  Savi Villalpando is also being seen today for persistent diverticulitis.  Patient had an initial flareup of this in November 2022 and recurrent symptoms developed in December.  She saw Amrit and was treated with Augmentin.  She reports some relief with the Augmentin therapy but has continued to have left lower quadrant abdominal pain.  Her last CT scan was done November 7, 2022.  She denies fever but reports nausea and GI upset, worse with eating.  Patient is concerned that diverticulitis may be due to her current Retevmo treatment for adenocarcinoma of the lung.  She has seen reports of other patients describing recurrent diverticulitis with this medicine.  She is following closely with her oncologist at the [facility] and plans to discuss this with them at her next visit.    Review of Systems   Constitutional: Negative for fever.   Gastrointestinal: Positive for abdominal pain and nausea.       Objective   Vital Signs:  /92 (BP Location: Right arm, Patient Position: Sitting, Cuff Size: Adult)   Pulse 82   Temp 97.7 °F (36.5 °C) (Temporal)   Ht 165.1 cm (65\")   Wt 95.4 kg (210 lb 6.4 oz)   SpO2 98%   BMI 35.01 kg/m²     Physical Exam  Vitals reviewed.   Constitutional:       General: She is not in acute distress.  HENT:      Head: Normocephalic and atraumatic.   Eyes:      General: No scleral icterus.     Extraocular Movements: Extraocular movements intact.      " Conjunctiva/sclera: Conjunctivae normal.      Pupils: Pupils are equal, round, and reactive to light.   Cardiovascular:      Rate and Rhythm: Normal rate and regular rhythm.   Pulmonary:      Effort: Pulmonary effort is normal. No respiratory distress.      Breath sounds: Normal breath sounds. No wheezing or rhonchi.   Abdominal:      Palpations: Abdomen is soft.      Tenderness: There is abdominal tenderness. There is guarding. There is no rebound.      Comments: Tenderness to palpation in the left lower quadrant with mild guarding but no rebound or rigidity.   Musculoskeletal:         General: No swelling.      Cervical back: Neck supple. No tenderness.   Lymphadenopathy:      Cervical: No cervical adenopathy.   Skin:     General: Skin is warm and dry.      Coloration: Skin is not jaundiced.   Neurological:      Mental Status: She is alert.   Psychiatric:         Mood and Affect: Mood normal.         Behavior: Behavior normal.                     Assessment and Plan   Diagnoses and all orders for this visit:    1. Encounter for wellness examination (Primary)    2. Health care maintenance  -     Lipid Panel    3. LLQ abdominal pain  -     CT Abdomen Pelvis With Contrast; Future  -     ondansetron (Zofran) 4 MG tablet; Take 1 tablet by mouth 4 (Four) Times a Day As Needed for Nausea or Vomiting.  Dispense: 120 tablet; Refill: 2  -     CBC (No Diff)  -     Comprehensive Metabolic Panel  -     Magnesium    4. Nausea  -     CT Abdomen Pelvis With Contrast; Future  -     ondansetron (Zofran) 4 MG tablet; Take 1 tablet by mouth 4 (Four) Times a Day As Needed for Nausea or Vomiting.  Dispense: 120 tablet; Refill: 2  -     CBC (No Diff)  -     Comprehensive Metabolic Panel  -     Magnesium    5. Adenocarcinoma of left lung (HCC)  -     ondansetron (Zofran) 4 MG tablet; Take 1 tablet by mouth 4 (Four) Times a Day As Needed for Nausea or Vomiting.  Dispense: 120 tablet; Refill: 2  -     CBC (No Diff)  -     Comprehensive  Metabolic Panel    Other orders  -     Pneumococcal Conjugate Vaccine 20-Valent All      I recommended checking CBC, CMP, magnesium level today.  Also, I recommended updating CT scan of the abdomen and pelvis given persistent symptoms.  Patient has a GI referral pending, scheduled for March 1, 2023 at the [facility].  I encouraged her to follow-up with GI as planned.  In the interim, we will increase Zofran to 4 times daily as needed.       Follow Up   Return in about 1 month (around 2/11/2023), or if symptoms worsen or fail to improve, for Recheck.  Patient was given instructions and counseling regarding her condition or for health maintenance advice. Please see specific information pulled into the AVS if appropriate.       This document has been electronically signed by Camila Soto DO  January 11, 2023 11:24 EST

## 2023-01-11 NOTE — PROGRESS NOTES
Venipuncture Blood Specimen Collection  Venipuncture performed in RIGHT ARM by Viry Maldonado RN with good hemostasis. Patient tolerated the procedure well without complications.   01/11/23   Viry Maldonado RN    Immunization  Immunization performed in RIGHT DELTOID by Viry Maldonado RN. Patient tolerated the procedure well without complications.  01/11/23   Viry Maldonado RN

## 2023-01-12 ENCOUNTER — HOSPITAL ENCOUNTER (OUTPATIENT)
Dept: CT IMAGING | Facility: HOSPITAL | Age: 68
Discharge: HOME | End: 2023-01-12
Payer: MEDICARE

## 2023-01-12 DIAGNOSIS — R10.32 LLQ ABDOMINAL PAIN: ICD-10-CM

## 2023-01-12 DIAGNOSIS — R11.0 NAUSEA: ICD-10-CM

## 2023-01-12 LAB
ALBUMIN SERPL-MCNC: 4.5 G/DL (ref 3.5–5.2)
ALBUMIN/GLOB SERPL: 1.7 G/DL
ALP SERPL-CCNC: 94 U/L (ref 39–117)
ALT SERPL W P-5'-P-CCNC: 26 U/L (ref 1–33)
ANION GAP SERPL CALCULATED.3IONS-SCNC: 10.9 MMOL/L (ref 5–15)
AST SERPL-CCNC: 31 U/L (ref 1–32)
BILIRUB SERPL-MCNC: 0.6 MG/DL (ref 0–1.2)
BUN SERPL-MCNC: 14 MG/DL (ref 8–23)
BUN/CREAT SERPL: 15.9 (ref 7–25)
CALCIUM SPEC-SCNC: 9.8 MG/DL (ref 8.6–10.5)
CHLORIDE SERPL-SCNC: 102 MMOL/L (ref 98–107)
CHOLEST SERPL-MCNC: 247 MG/DL (ref 0–200)
CO2 SERPL-SCNC: 28.1 MMOL/L (ref 22–29)
CREAT SERPL-MCNC: 0.88 MG/DL (ref 0.57–1)
DEPRECATED RDW RBC AUTO: 44.7 FL (ref 37–54)
EGFRCR SERPLBLD CKD-EPI 2021: 72.6 ML/MIN/1.73
ERYTHROCYTE [DISTWIDTH] IN BLOOD BY AUTOMATED COUNT: 14.6 % (ref 12.3–15.4)
GLOBULIN UR ELPH-MCNC: 2.6 GM/DL
GLUCOSE SERPL-MCNC: 82 MG/DL (ref 65–99)
HCT VFR BLD AUTO: 44.2 % (ref 34–46.6)
HDLC SERPL-MCNC: 60 MG/DL (ref 40–60)
HGB BLD-MCNC: 14 G/DL (ref 12–15.9)
LDLC SERPL CALC-MCNC: 167 MG/DL (ref 0–100)
LDLC/HDLC SERPL: 2.74 {RATIO}
MAGNESIUM SERPL-MCNC: 2.1 MG/DL (ref 1.6–2.4)
MCH RBC QN AUTO: 26.6 PG (ref 26.6–33)
MCHC RBC AUTO-ENTMCNC: 31.7 G/DL (ref 31.5–35.7)
MCV RBC AUTO: 84 FL (ref 79–97)
PLATELET # BLD AUTO: 370 10*3/MM3 (ref 140–450)
PMV BLD AUTO: 10.1 FL (ref 6–12)
POTASSIUM SERPL-SCNC: 4.4 MMOL/L (ref 3.5–5.2)
PROT SERPL-MCNC: 7.1 G/DL (ref 6–8.5)
RBC # BLD AUTO: 5.26 10*6/MM3 (ref 3.77–5.28)
SODIUM SERPL-SCNC: 141 MMOL/L (ref 136–145)
TRIGL SERPL-MCNC: 112 MG/DL (ref 0–150)
VLDLC SERPL-MCNC: 20 MG/DL (ref 5–40)
WBC NRBC COR # BLD: 6.41 10*3/MM3 (ref 3.4–10.8)

## 2023-01-13 ENCOUNTER — HOSPITAL ENCOUNTER (OUTPATIENT)
Dept: CT IMAGING | Facility: HOSPITAL | Age: 68
Discharge: HOME | End: 2023-01-13
Payer: MEDICARE

## 2023-01-13 PROCEDURE — 25010000002 IOPAMIDOL 61 % SOLUTION: Performed by: INTERNAL MEDICINE

## 2023-01-13 PROCEDURE — 74177 CT ABD & PELVIS W/CONTRAST: CPT

## 2023-01-13 PROCEDURE — 74177 CT ABD & PELVIS W/CONTRAST: CPT | Performed by: RADIOLOGY

## 2023-01-13 RX ADMIN — IOPAMIDOL 100 ML: 612 INJECTION, SOLUTION INTRAVENOUS at 09:03

## 2023-01-19 ENCOUNTER — OFFICE VISIT (OUTPATIENT)
Dept: FAMILY MEDICINE CLINIC | Facility: CLINIC | Age: 68
End: 2023-01-19
Payer: MEDICARE

## 2023-01-19 VITALS
DIASTOLIC BLOOD PRESSURE: 86 MMHG | WEIGHT: 212.2 LBS | SYSTOLIC BLOOD PRESSURE: 142 MMHG | HEART RATE: 78 BPM | OXYGEN SATURATION: 98 % | BODY MASS INDEX: 35.31 KG/M2 | TEMPERATURE: 98 F

## 2023-01-19 DIAGNOSIS — K21.9 GASTROESOPHAGEAL REFLUX DISEASE WITHOUT ESOPHAGITIS: Primary | Chronic | ICD-10-CM

## 2023-01-19 DIAGNOSIS — R11.0 NAUSEA: Chronic | ICD-10-CM

## 2023-01-19 DIAGNOSIS — K44.9 HIATAL HERNIA: Chronic | ICD-10-CM

## 2023-01-19 DIAGNOSIS — C34.92 ADENOCARCINOMA OF LEFT LUNG: Chronic | ICD-10-CM

## 2023-01-19 DIAGNOSIS — K57.90 DIVERTICULOSIS: Chronic | ICD-10-CM

## 2023-01-19 PROBLEM — R10.32 LLQ ABDOMINAL PAIN: Status: RESOLVED | Noted: 2023-01-11 | Resolved: 2023-01-19

## 2023-01-19 PROBLEM — R79.89 ELEVATED LFTS: Status: RESOLVED | Noted: 2022-11-08 | Resolved: 2023-01-19

## 2023-01-19 PROBLEM — L02.415 ABSCESS OF RIGHT LOWER EXTREMITY: Status: RESOLVED | Noted: 2022-11-08 | Resolved: 2023-01-19

## 2023-01-19 PROCEDURE — 99214 OFFICE O/P EST MOD 30 MIN: CPT | Performed by: INTERNAL MEDICINE

## 2023-01-19 NOTE — PROGRESS NOTES
Patient Name: Savi Villalpando Today's Date: 2023   Patient MRN / CSN: [id number] / [id number] Date of Encounter: 2023   Patient Age / : 67 y.o. / 1955 Encounter Provider: Camila Soto DO   Referring Physician: No ref. provider found          Savi is a 67 y.o. female who is being seen today for Results      History of Present Illness     Savi presents today to discuss her recent CT scan of the abdomen and pelvis done for persistent left lower quadrant pain with recent diverticulitis.  Her latest CT scan showed diverticulosis without diverticulitis.  There was intrahepatic and common bile duct dilatation without any pancreatic abnormalities visualized.  Patient is status postcholecystectomy in .  There was also left basilar consolidation and small right lung nodule noted which were similar to previous scans.      Savi has adenocarcinoma of the lung and is currently undergoing oncologic treatment with Retevmo as prescribed by her oncologist at the [facility].  She reports being aware of the bile duct dilatation since the .  She has changed her diet and effort to decrease diverticulitis flares and reports the left lower quadrant abdominal pain has improved.  She is having epigastric pain and frequent nausea.  Hiatal hernia was seen on this CT scan, and also seen on previous CT scans.  Patient has not been taking anything for acid suppression but denies heartburn or acid reflux.  She does note GI symptoms worsen if she lays flat.  She reports Zofran and diet changes are helping some with the nausea.    Allergies include:Fluad quadrivalent [influenza vac a&b sa adj quad], Metronidazole, Multi complete-iron [anti-oxidant], Other, Sertraline, Shellfish-derived products, and Sulfa antibiotics  Current Outpatient Medications   Medication Sig Dispense Refill   • CHARCOAL PO Take  by mouth.     • albuterol (PROVENTIL) (2.5 MG/3ML) 0.083% nebulizer solution Take 2.5 mg by nebulization 3  (Three) Times a Week.     • benzonatate (TESSALON) 100 MG capsule Take 1 capsule by mouth 3 (Three) Times a Day As Needed for Cough.     • Dextromethorphan-guaiFENesin (Mucinex DM Maximum Strength)  MG tablet sustained-release 12 hour Take  by mouth As Needed.     • dry mouth gel (BIOTENE ORALBALANCE) gel Apply  to the mouth or throat As Needed for Dry Mouth.     • Fluticasone-Salmeterol (ADVAIR/WIXELA) 250-50 MCG/ACT DISKUS INHALE 1 PUFF BY MOUTH TWICE DAILY, RINSE MOUTH WITH WATER AFTER USE TO REDUCE AFTERTASTE AND INCIDENCE OF CANDIDIASIS, DO NOT SWALLOW     • Hypertonic Nasal Wash (Sinus Rinse) pack into the nostril(s) as directed by provider.     • LEVALBUTEROL TARTRATE HFA IN Inhale.     • Melatonin 10 MG tablet Take  by mouth.     • ondansetron (Zofran) 4 MG tablet Take 1 tablet by mouth 4 (Four) Times a Day As Needed for Nausea or Vomiting. 120 tablet 2   • oxyCODONE (OXY-IR) 5 MG capsule Take 1 capsule by mouth Every 4 (Four) Hours As Needed for Moderate Pain. Pt taking every 8hrs     • oxyCODONE (oxyCONTIN) 10 MG 12 hr tablet Take 1 tablet by mouth Every 12 (Twelve) Hours.     • polyethylene glycol (MIRALAX) 17 g packet Take 17 g by mouth Daily.     • pseudoephedrine (SUDAFED) 120 MG 12 hr tablet Take 1 tablet by mouth Daily As Needed for Congestion.     • Selpercatinib (RETEVMO) 80 MG capsule Take 80 mg by mouth 2 (Two) Times a Day.     • senna (SENOKOT) 8.6 MG tablet Take 8.6 mg by mouth.     • sennosides-docusate (PERICOLACE) 8.6-50 MG per tablet Take 1 tablet by mouth 2 (Two) Times a Day.     • sodium chloride 3 % nebulizer solution Take 4 mL by nebulization As Needed for Cough.     • vitamin E 15385 units external oil Apply  topically to the appropriate area as directed Daily.     • Wound Cleansers (WOUND WASH EX) Apply  topically.       No current facility-administered medications for this visit.     Past Medical History:   Diagnosis Date   • Allergic 1955   • Allergies    • Anxiety 1970   •  Arthritis 2010   • Asthma 1956   • Cholelithiasis 1976   • Diverticulitis    • Diverticulosis 2022   • Elevated liver enzymes    • HL (hearing loss) 2011   • Lung cancer (HCC)     stage 4   • Obesity 1997   • Rib fracture      Family History   Problem Relation Age of Onset   • Arthritis Mother    • Mental illness Mother    • Heart disease Father    • Alcohol abuse Sister    • Miscarriages / Stillbirths Daughter    • Asthma Maternal Grandmother    • Hearing loss Maternal Grandmother    • Cancer Maternal Uncle    • Breast cancer Neg Hx      Past Surgical History:   Procedure Laterality Date   • BILATERAL BREAST REDUCTION      3 lb removal   • BREAST EXCISIONAL BIOPSY Right 1989    benign   • BREAST SURGERY  2021   • CERVICAL FUSION      C567 partial 8   • CHOLECYSTECTOMY     • COSMETIC SURGERY  2021   • EYE SURGERY  2000   • HAND SURGERY      x3   • HYSTERECTOMY Bilateral 1999    total-benign   • LYMPH NODE DISSECTION      back of neck   • REDUCTION MAMMAPLASTY Bilateral 2019   • TRIGGER FINGER RELEASE       Social History     Substance and Sexual Activity   Alcohol Use Never     Social History     Tobacco Use   Smoking Status Never   Smokeless Tobacco Never   Tobacco Comments    Both parents smoked     Social History     Substance and Sexual Activity   Drug Use Never     Review of Systems   Constitutional: Negative for fever.   Respiratory:        Cough and shortness of air have drastically improved with her current Retevmo treatment.   Gastrointestinal: Positive for abdominal pain and nausea.        Depression Assessment Review:  PHQ-9 Total Score:    Vital Signs & Measurements Taken This Encounter  /86 (BP Location: Right arm, Patient Position: Sitting, Cuff Size: Adult)   Pulse 78   Temp 98 °F (36.7 °C) (Temporal)   Wt 96.3 kg (212 lb 3.2 oz)   SpO2 98%   BMI 35.31 kg/m²    SpO2 Percentage    01/19/23 1130   SpO2: 98%        Physical Exam  Vitals reviewed.   Constitutional:       General: She is not in  acute distress.  HENT:      Head: Normocephalic and atraumatic.   Eyes:      General: No scleral icterus.     Extraocular Movements: Extraocular movements intact.      Conjunctiva/sclera: Conjunctivae normal.      Pupils: Pupils are equal, round, and reactive to light.   Cardiovascular:      Rate and Rhythm: Normal rate and regular rhythm.   Pulmonary:      Effort: Pulmonary effort is normal. No respiratory distress.      Breath sounds: Normal breath sounds.   Abdominal:      Palpations: Abdomen is soft.      Tenderness: There is no guarding or rebound.      Comments: Mild tenderness to palpation in the epigastric and left lower quadrant regions.   Musculoskeletal:         General: No swelling.      Cervical back: Neck supple. No tenderness.   Lymphadenopathy:      Cervical: No cervical adenopathy.   Skin:     General: Skin is warm and dry.      Coloration: Skin is not jaundiced.   Neurological:      Mental Status: She is alert.   Psychiatric:         Mood and Affect: Mood normal.         Behavior: Behavior normal.          Assessment & Plan  Patient Active Problem List   Diagnosis   • Adenocarcinoma of lung (HCC)   • Diverticulosis   • Hiatal hernia   • Nausea   • Gastroesophageal reflux disease without esophagitis       ICD-10-CM ICD-9-CM   1. Gastroesophageal reflux disease without esophagitis  K21.9 530.81   2. Hiatal hernia  K44.9 553.3   3. Nausea  R11.0 787.02   4. Adenocarcinoma of left lung (HCC)  C34.92 162.9   5. Diverticulosis  K57.90 562.10     No orders of the defined types were placed in this encounter.      Meds Ordered During Visit:  No orders of the defined types were placed in this encounter.      I discussed starting PPI therapy to help with GI symptoms.  However, there is a drug interaction which PPI therapy may decrease Retevmo plasma concentration.  I explained this to patient and recommended that she discuss this with her oncologist prior to a starting a PPI.  Patient is agreeable to this and  has an upcoming appointment with her oncologist within the next 2 weeks.  I encouraged her to continue healthy diet changes and Zofran.  I  encouraged her to continue her current medicine regimen.  I reviewed CT scan in detail with patient today and answered all questions.  I encouraged her to follow-up with gastroenterology as she has planned in early March.    Return if symptoms worsen or fail to improve, for Next scheduled follow up.          Referring Provider (if known): No ref. provider found      This document has been electronically signed by Camila Soto DO  January 19, 2023 13:25 EST    Camila Soto DO, FACOI  [address] [place] [address]  [phone] (office)    Part of this note may be an electronic transcription/translation of spoken language to printed text using the Dragon Dictation System.  Answers for HPI/ROS submitted by the patient on 1/18/2023  Please describe your symptoms.: Visit to review scans  Have you had these symptoms before?: Yes  How long have you been having these symptoms?: Greater than 2 weeks  Please list any medications you are currently taking for this condition.: Charcoal, senekot s, anti-nausea, magnesium  Please describe any probable cause for these symptoms. : Diverticulosis  What is the primary reason for your visit?: Other

## 2023-02-14 ENCOUNTER — OFFICE VISIT (OUTPATIENT)
Dept: FAMILY MEDICINE CLINIC | Facility: CLINIC | Age: 68
End: 2023-02-14
Payer: MEDICARE

## 2023-02-14 VITALS
WEIGHT: 211.6 LBS | TEMPERATURE: 98.2 F | DIASTOLIC BLOOD PRESSURE: 78 MMHG | SYSTOLIC BLOOD PRESSURE: 116 MMHG | OXYGEN SATURATION: 98 % | BODY MASS INDEX: 35.21 KG/M2 | HEART RATE: 86 BPM

## 2023-02-14 DIAGNOSIS — R05.1 ACUTE COUGH: ICD-10-CM

## 2023-02-14 DIAGNOSIS — C34.92 ADENOCARCINOMA OF LEFT LUNG: Primary | Chronic | ICD-10-CM

## 2023-02-14 LAB
EXPIRATION DATE: NORMAL
FLUAV AG UPPER RESP QL IA.RAPID: NOT DETECTED
FLUBV AG UPPER RESP QL IA.RAPID: NOT DETECTED
INTERNAL CONTROL: NORMAL
Lab: NORMAL
RSV AG SPEC QL: NEGATIVE
S PYO AG THROAT QL: NEGATIVE
SARS-COV-2 AG UPPER RESP QL IA.RAPID: NOT DETECTED

## 2023-02-14 PROCEDURE — 99214 OFFICE O/P EST MOD 30 MIN: CPT | Performed by: INTERNAL MEDICINE

## 2023-02-14 PROCEDURE — 87880 STREP A ASSAY W/OPTIC: CPT | Performed by: INTERNAL MEDICINE

## 2023-02-14 PROCEDURE — 87807 RSV ASSAY W/OPTIC: CPT | Performed by: INTERNAL MEDICINE

## 2023-02-14 PROCEDURE — 87428 SARSCOV & INF VIR A&B AG IA: CPT | Performed by: INTERNAL MEDICINE

## 2023-02-14 NOTE — PROGRESS NOTES
"  Patient Name: Savi Villalpando Today's Date: 2023   Patient MRN / CSN: [id number] / [id number] Date of Encounter: 2023   Patient Age / : 67 y.o. / 1955 Encounter Provider: Camila Soto DO   Referring Physician: No ref. provider found          Savi is a 67 y.o. female who is being seen today for Follow-up      History of Present Illness     Savi presents today for follow up on Adenocarcinoma of the Lung. She is following with Oncology and Palliative Care at the [facility]. She is also following with Pain Management locally, with Dr. Hdez. She is only taking the pain medicine as needed and reports not needing it daily. She has continued retevmo therapy. She has noted increased cough recently. Yesterday, she also started feeling very fatigued and \"achy\". She has noted increased nausea recently as well, associated with the increased cough.     Allergies include:Fluad quadrivalent [influenza vac a&b sa adj quad], Metronidazole, Multi complete-iron [anti-oxidant], Other, Sertraline, Shellfish-derived products, and Sulfa antibiotics  Current Outpatient Medications   Medication Sig Dispense Refill   • albuterol (PROVENTIL) (2.5 MG/3ML) 0.083% nebulizer solution Take 2.5 mg by nebulization 3 (Three) Times a Week.     • CALCIUM PO Take  by mouth.     • CHARCOAL PO Take  by mouth.     • dry mouth gel (BIOTENE ORALBALANCE) gel Apply  to the mouth or throat As Needed for Dry Mouth.     • Fluticasone-Salmeterol (ADVAIR/WIXELA) 250-50 MCG/ACT DISKUS INHALE 1 PUFF BY MOUTH TWICE DAILY, RINSE MOUTH WITH WATER AFTER USE TO REDUCE AFTERTASTE AND INCIDENCE OF CANDIDIASIS, DO NOT SWALLOW     • Hypertonic Nasal Wash (Sinus Rinse) pack into the nostril(s) as directed by provider.     • LEVALBUTEROL TARTRATE HFA IN Inhale.     • ondansetron (Zofran) 4 MG tablet Take 1 tablet by mouth 4 (Four) Times a Day As Needed for Nausea or Vomiting. 120 tablet 2   • oxyCODONE (OXY-IR) 5 MG capsule Take 1 capsule by mouth " Every 4 (Four) Hours As Needed for Moderate Pain. Pt taking every 8hrs     • polyethylene glycol (MIRALAX) 17 g packet Take 17 g by mouth Daily.     • Selpercatinib (RETEVMO) 80 MG capsule Take 80 mg by mouth 2 (Two) Times a Day.     • senna (SENOKOT) 8.6 MG tablet Take 8.6 mg by mouth.     • sennosides-docusate (PERICOLACE) 8.6-50 MG per tablet Take 1 tablet by mouth 2 (Two) Times a Day.     • sodium chloride 3 % nebulizer solution Take 4 mL by nebulization As Needed for Cough.     • benzonatate (TESSALON) 100 MG capsule Take 1 capsule by mouth 3 (Three) Times a Day As Needed for Cough.     • Dextromethorphan-guaiFENesin (Mucinex DM Maximum Strength)  MG tablet sustained-release 12 hour Take  by mouth As Needed.     • oxyCODONE (oxyCONTIN) 10 MG 12 hr tablet Take 1 tablet by mouth Every 12 (Twelve) Hours.     • pseudoephedrine (SUDAFED) 120 MG 12 hr tablet Take 1 tablet by mouth Daily As Needed for Congestion.     • Wound Cleansers (WOUND WASH EX) Apply  topically.       No current facility-administered medications for this visit.     Past Medical History:   Diagnosis Date   • Allergic 1956   • Allergies    • Anxiety 1970   • Arthritis 2010   • Asthma 1956   • Cholelithiasis 1976   • Diverticulitis    • Diverticulosis 2022   • Elevated liver enzymes    • HL (hearing loss) 2011   • Lung cancer (HCC)     stage 4   • Obesity 1997   • Rib fracture      Family History   Problem Relation Age of Onset   • Arthritis Mother    • Mental illness Mother    • Heart disease Father    • Alcohol abuse Sister    • Miscarriages / Stillbirths Daughter    • Asthma Maternal Grandmother    • Hearing loss Maternal Grandmother    • Cancer Maternal Uncle    • Breast cancer Neg Hx      Past Surgical History:   Procedure Laterality Date   • BILATERAL BREAST REDUCTION      3 lb removal   • BREAST EXCISIONAL BIOPSY Right 1989    benign   • BREAST SURGERY  2021   • CERVICAL FUSION      C567 partial 8   • CHOLECYSTECTOMY     • COSMETIC  SURGERY  2021   • EYE SURGERY  2000   • HAND SURGERY      x3   • HYSTERECTOMY Bilateral 1999    total-benign   • LYMPH NODE DISSECTION      back of neck   • REDUCTION MAMMAPLASTY Bilateral 2019   • TRIGGER FINGER RELEASE       Social History     Substance and Sexual Activity   Alcohol Use Never     Social History     Tobacco Use   Smoking Status Never   Smokeless Tobacco Never   Tobacco Comments    Both parents smoked     Social History     Substance and Sexual Activity   Drug Use Never     Review of Systems   Constitutional: Positive for fatigue. Negative for fever.   HENT: Positive for congestion.    Respiratory: Positive for cough.         No hemoptysis.   Gastrointestinal: Positive for nausea.   Musculoskeletal: Positive for myalgias.        Depression Assessment Review:  PHQ-9 Total Score:    Vital Signs & Measurements Taken This Encounter  /78 (BP Location: Left arm, Patient Position: Sitting, Cuff Size: Adult)   Pulse 86   Temp 98.2 °F (36.8 °C) (Temporal)   Wt 96 kg (211 lb 9.6 oz)   SpO2 98%   BMI 35.21 kg/m²    SpO2 Percentage    02/14/23 0910   SpO2: 98%          Physical Exam  Vitals reviewed.   Constitutional:       General: She is not in acute distress.  HENT:      Head: Normocephalic and atraumatic.      Right Ear: Tympanic membrane normal.      Left Ear: Tympanic membrane normal.      Mouth/Throat:      Mouth: Mucous membranes are moist.      Pharynx: No oropharyngeal exudate or posterior oropharyngeal erythema.   Eyes:      General: No scleral icterus.     Extraocular Movements: Extraocular movements intact.      Conjunctiva/sclera: Conjunctivae normal.      Pupils: Pupils are equal, round, and reactive to light.   Cardiovascular:      Rate and Rhythm: Normal rate and regular rhythm.   Pulmonary:      Effort: Pulmonary effort is normal. No respiratory distress.      Breath sounds: Normal breath sounds.   Musculoskeletal:         General: No swelling.      Cervical back: Neck supple. No  tenderness.   Lymphadenopathy:      Cervical: No cervical adenopathy.   Skin:     General: Skin is warm and dry.      Coloration: Skin is not jaundiced.   Neurological:      Mental Status: She is alert.   Psychiatric:         Mood and Affect: Mood normal.         Behavior: Behavior normal.              Assessment & Plan  Patient Active Problem List   Diagnosis   • Adenocarcinoma of lung (HCC)   • Diverticulosis   • Hiatal hernia   • Nausea   • Gastroesophageal reflux disease without esophagitis       ICD-10-CM ICD-9-CM   1. Adenocarcinoma of left lung (HCC)  C34.92 162.9   2. Acute cough  R05.1 786.2     Orders Placed This Encounter   Procedures   • POCT SARS-CoV-2 Antigen TARYN   • POC Rapid Strep A     Order Specific Question:   Release to patient     Answer:   Routine Release   • POCT RSV     Order Specific Question:   Release to patient     Answer:   Routine Release       Meds Ordered During Visit:  No orders of the defined types were placed in this encounter.    I reviewed palliative care and oncology [facility]. I also reviewed PDMP. Patient was negative for covid, flu and strep today. I encouraged her to do a short course of sudafed, as she has done previously with good relief and tolerance. I also encouraged her to follow up at the [facility] later this month as scheduled for repeat ct scans, oncology and GI evaluations.     Return in about 2 months (around 4/14/2023), or if symptoms worsen or fail to improve, for Recheck.          Referring Provider (if known): No ref. provider found      This document has been electronically signed by Camila Soto DO  February 14, 2023 12:43 EST    Camila Soto DO, FACOI  [address] [place] [address]  [phone] (office)    Part of this note may be an electronic transcription/translation of spoken language to printed text using the Dragon Dictation System.

## 2023-03-15 ENCOUNTER — OFFICE VISIT (OUTPATIENT)
Dept: FAMILY MEDICINE CLINIC | Facility: CLINIC | Age: 68
End: 2023-03-15
Payer: MEDICARE

## 2023-03-15 VITALS
SYSTOLIC BLOOD PRESSURE: 122 MMHG | HEART RATE: 76 BPM | DIASTOLIC BLOOD PRESSURE: 80 MMHG | OXYGEN SATURATION: 96 % | TEMPERATURE: 97.5 F | BODY MASS INDEX: 35.88 KG/M2 | WEIGHT: 215.6 LBS

## 2023-03-15 DIAGNOSIS — C34.92 ADENOCARCINOMA OF LEFT LUNG: Primary | Chronic | ICD-10-CM

## 2023-03-15 DIAGNOSIS — K21.9 GASTROESOPHAGEAL REFLUX DISEASE WITHOUT ESOPHAGITIS: Chronic | ICD-10-CM

## 2023-03-15 DIAGNOSIS — M79.10 MYALGIA: ICD-10-CM

## 2023-03-15 DIAGNOSIS — R05.9 COUGH, UNSPECIFIED TYPE: ICD-10-CM

## 2023-03-15 DIAGNOSIS — R39.9 UTI SYMPTOMS: ICD-10-CM

## 2023-03-15 DIAGNOSIS — J30.2 SEASONAL ALLERGIC RHINITIS, UNSPECIFIED TRIGGER: ICD-10-CM

## 2023-03-15 DIAGNOSIS — R52 BODY ACHES: ICD-10-CM

## 2023-03-15 LAB
BILIRUB BLD-MCNC: NEGATIVE MG/DL
CLARITY, POC: CLEAR
COLOR UR: YELLOW
EXPIRATION DATE: NORMAL
FLUAV AG UPPER RESP QL IA.RAPID: NOT DETECTED
FLUBV AG UPPER RESP QL IA.RAPID: NOT DETECTED
GLUCOSE UR STRIP-MCNC: NEGATIVE MG/DL
INTERNAL CONTROL: NORMAL
KETONES UR QL: NEGATIVE
LEUKOCYTE EST, POC: NEGATIVE
Lab: NORMAL
NITRITE UR-MCNC: NEGATIVE MG/ML
PH UR: 6.5 [PH] (ref 5–8)
PROT UR STRIP-MCNC: NEGATIVE MG/DL
RBC # UR STRIP: NEGATIVE /UL
SARS-COV-2 AG UPPER RESP QL IA.RAPID: NOT DETECTED
SP GR UR: 1.01 (ref 1–1.03)
UROBILINOGEN UR QL: NORMAL

## 2023-03-15 PROCEDURE — 1159F MED LIST DOCD IN RCRD: CPT | Performed by: INTERNAL MEDICINE

## 2023-03-15 PROCEDURE — 99214 OFFICE O/P EST MOD 30 MIN: CPT | Performed by: INTERNAL MEDICINE

## 2023-03-15 PROCEDURE — 1160F RVW MEDS BY RX/DR IN RCRD: CPT | Performed by: INTERNAL MEDICINE

## 2023-03-15 PROCEDURE — 36415 COLL VENOUS BLD VENIPUNCTURE: CPT | Performed by: INTERNAL MEDICINE

## 2023-03-15 PROCEDURE — 85027 COMPLETE CBC AUTOMATED: CPT | Performed by: INTERNAL MEDICINE

## 2023-03-15 PROCEDURE — 81002 URINALYSIS NONAUTO W/O SCOPE: CPT | Performed by: INTERNAL MEDICINE

## 2023-03-15 PROCEDURE — 80053 COMPREHEN METABOLIC PANEL: CPT | Performed by: INTERNAL MEDICINE

## 2023-03-15 PROCEDURE — 82550 ASSAY OF CK (CPK): CPT | Performed by: INTERNAL MEDICINE

## 2023-03-15 PROCEDURE — 87428 SARSCOV & INF VIR A&B AG IA: CPT | Performed by: INTERNAL MEDICINE

## 2023-03-15 RX ORDER — NITROFURANTOIN 25; 75 MG/1; MG/1
100 CAPSULE ORAL 2 TIMES DAILY WITH MEALS
COMMUNITY
Start: 2023-03-04 | End: 2023-03-15

## 2023-03-15 RX ORDER — LEVOCETIRIZINE DIHYDROCHLORIDE 5 MG/1
2.5 TABLET, FILM COATED ORAL EVERY EVENING
Qty: 45 TABLET | Refills: 3 | Status: SHIPPED | OUTPATIENT
Start: 2023-03-15

## 2023-03-15 RX ORDER — LEVALBUTEROL TARTRATE 45 UG/1
AEROSOL, METERED ORAL
COMMUNITY
Start: 2023-02-28

## 2023-03-15 RX ORDER — PHENAZOPYRIDINE HYDROCHLORIDE 200 MG/1
1 TABLET, FILM COATED ORAL 3 TIMES DAILY
COMMUNITY
Start: 2023-03-04 | End: 2023-03-15

## 2023-03-15 RX ORDER — OMEPRAZOLE 20 MG/1
CAPSULE, DELAYED RELEASE ORAL
COMMUNITY
Start: 2023-03-14

## 2023-03-15 NOTE — PROGRESS NOTES
Patient Name: Savi Villalpando Today's Date: 3/15/2023   Patient MRN / CSN: [id number] / [id number] Date of Encounter: 3/15/2023   Patient Age / : 67 y.o. / 1955 Encounter Provider: Camila Soto DO   Referring Physician: No ref. provider found          Savi is a 67 y.o. female who is being seen today for Urinary Tract Infection, Cough, and Generalized Body Aches      History of Present Illness     Savi presents today to follow-up on a recent urinary tract infection.  She reports her symptoms started last week and she went to urgent care at that time.  She was treated with Macrobid, which she has completed.  She was unsure if it had resolved her symptoms.  She is not having fevers.  She is still having muscle pain which she reports this is more in her upper back and shoulders.    Savi has a persistent cough.  She has adenocarcinoma the left lung, treated at the [facility] with Retevmo therapy.  She is responded very well to Retevmo and had a recent follow-up with her oncologist at [place], who told her that the tumor has continued to shrink in size after updating CT scans.  Despite getting this good news, patient has noted this frequent cough with postnasal drainage and clear to off-white sputum at times.  She denies wheezing but does note some shortness of air with exertion.  Again she denies fever.  She was having some chest heaviness but reports that this has resolved since she started taking omeprazole as prescribed by her gastroenterologist at [facility] [place].  He told her to space the Retevmo and omeprazole at least 2 hours apart to avoid interactions.  She has felt better on omeprazole but is still having this chronic cough.  She also notices hoarseness intermittently.    Allergies include:Fluad quadrivalent [influenza vac a&b sa adj quad], Metronidazole, Multi complete-iron [anti-oxidant], Other, Sertraline, Shellfish-derived products, and Sulfa antibiotics  Current Outpatient Medications   Medication  Sig Dispense Refill   • albuterol (PROVENTIL) (2.5 MG/3ML) 0.083% nebulizer solution Take 2.5 mg by nebulization 3 (Three) Times a Week.     • benzonatate (TESSALON) 100 MG capsule Take 1 capsule by mouth 3 (Three) Times a Day As Needed for Cough.     • CALCIUM PO Take  by mouth.     • CHARCOAL PO Take  by mouth.     • Dextromethorphan-guaiFENesin (Mucinex DM Maximum Strength)  MG tablet sustained-release 12 hour Take  by mouth As Needed.     • dry mouth gel (BIOTENE ORALBALANCE) gel Apply  to the mouth or throat As Needed for Dry Mouth.     • Fluticasone-Salmeterol (ADVAIR/WIXELA) 250-50 MCG/ACT DISKUS INHALE 1 PUFF BY MOUTH TWICE DAILY, RINSE MOUTH WITH WATER AFTER USE TO REDUCE AFTERTASTE AND INCIDENCE OF CANDIDIASIS, DO NOT SWALLOW     • levalbuterol (XOPENEX HFA) 45 MCG/ACT inhaler      • omeprazole (priLOSEC) 20 MG capsule      • ondansetron (Zofran) 4 MG tablet Take 1 tablet by mouth 4 (Four) Times a Day As Needed for Nausea or Vomiting. 120 tablet 2   • oxyCODONE (OXY-IR) 5 MG capsule Take 1 capsule by mouth Every 4 (Four) Hours As Needed for Moderate Pain. Pt taking every 8hrs     • polyethylene glycol (MIRALAX) 17 g packet Take 17 g by mouth Daily.     • pseudoephedrine (SUDAFED) 120 MG 12 hr tablet Take 1 tablet by mouth Daily As Needed for Congestion.     • Selpercatinib (RETEVMO) 80 MG capsule Take 1 capsule by mouth 2 (Two) Times a Day.     • senna (SENOKOT) 8.6 MG tablet Take 1 tablet by mouth.     • sennosides-docusate (PERICOLACE) 8.6-50 MG per tablet Take 1 tablet by mouth 2 (Two) Times a Day.     • sodium chloride 3 % nebulizer solution Take 4 mL by nebulization As Needed for Cough.     • Wound Cleansers (WOUND WASH EX) Apply  topically.     • Hypertonic Nasal Wash (Sinus Rinse) pack into the nostril(s) as directed by provider.     • levocetirizine (Xyzal Allergy 24HR) 5 MG tablet Take 0.5 tablets by mouth Every Evening. 45 tablet 3   • oxyCODONE (oxyCONTIN) 10 MG 12 hr tablet Take 1 tablet  by mouth Every 12 (Twelve) Hours. (Patient not taking: Reported on 3/15/2023)       No current facility-administered medications for this visit.     Past Medical History:   Diagnosis Date   • Allergic 1955   • Allergies    • Anxiety 1970   • Arthritis 2010   • Asthma 1956   • Cholelithiasis 1976   • Diverticulitis    • Diverticulosis 2022   • Elevated liver enzymes    • HL (hearing loss) 2011   • Lung cancer (HCC)     stage 4   • Obesity 1997   • Rib fracture      Family History   Problem Relation Age of Onset   • Arthritis Mother    • Mental illness Mother    • Heart disease Father    • Alcohol abuse Sister    • Miscarriages / Stillbirths Daughter    • Asthma Maternal Grandmother    • Hearing loss Maternal Grandmother    • Cancer Maternal Uncle    • Breast cancer Neg Hx      Past Surgical History:   Procedure Laterality Date   • BILATERAL BREAST REDUCTION      3 lb removal   • BREAST EXCISIONAL BIOPSY Right 1989    benign   • BREAST SURGERY  2021   • CERVICAL FUSION      C567 partial 8   • CHOLECYSTECTOMY     • COSMETIC SURGERY  2021   • EYE SURGERY  2000   • HAND SURGERY      x3   • HYSTERECTOMY Bilateral 1999    total-benign   • LYMPH NODE DISSECTION      back of neck   • REDUCTION MAMMAPLASTY Bilateral 2019   • TRIGGER FINGER RELEASE       Social History     Substance and Sexual Activity   Alcohol Use Never     Social History     Tobacco Use   Smoking Status Never   Smokeless Tobacco Never   Tobacco Comments    Both parents smoked     Social History     Substance and Sexual Activity   Drug Use Never     Review of Systems   Constitutional: Negative for fever.   HENT: Positive for postnasal drip and voice change.    Respiratory: Positive for cough and shortness of breath.         Shortness of air with exertion   Cardiovascular:        Chest heaviness has resolved after omeprazole started.    Gastrointestinal: Negative for abdominal pain and blood in stool.   Genitourinary: Negative for hematuria.    Musculoskeletal: Positive for arthralgias and myalgias.        Depression Assessment Review:  PHQ-9 Total Score:    Vital Signs & Measurements Taken This Encounter  /80 (BP Location: Right arm, Patient Position: Sitting, Cuff Size: Adult)   Pulse 76   Temp 97.5 °F (36.4 °C) (Temporal)   Wt 97.8 kg (215 lb 9.6 oz)   SpO2 96%   BMI 35.88 kg/m²    SpO2 Percentage    03/15/23 0929   SpO2: 96%        Class 2 Severe Obesity (BMI >=35 and <=39.9). Obesity-related health conditions include the following: none. Obesity is worsening. BMI is is above average; BMI management plan is completed. We discussed Patient reports noticing an oncology nutritionist through the [facility].  I encouraged her to continue this follow-up and the recommendations.      Physical Exam  Vitals reviewed.   Constitutional:       General: She is not in acute distress.  HENT:      Head: Normocephalic and atraumatic.      Right Ear: Tympanic membrane normal.      Left Ear: Tympanic membrane normal.   Eyes:      General: No scleral icterus.     Extraocular Movements: Extraocular movements intact.      Conjunctiva/sclera: Conjunctivae normal.      Pupils: Pupils are equal, round, and reactive to light.   Cardiovascular:      Rate and Rhythm: Normal rate and regular rhythm.   Pulmonary:      Effort: Pulmonary effort is normal. No respiratory distress.      Breath sounds: No wheezing, rhonchi or rales.      Comments: Frequent, dry cough  Musculoskeletal:         General: No swelling.      Cervical back: Neck supple. No tenderness.   Lymphadenopathy:      Cervical: No cervical adenopathy.   Skin:     General: Skin is warm and dry.      Coloration: Skin is not jaundiced.   Neurological:      Mental Status: She is alert.   Psychiatric:         Mood and Affect: Mood normal.         Behavior: Behavior normal.              Assessment & Plan  Patient Active Problem List   Diagnosis   • Adenocarcinoma of lung (HCC)   • Diverticulosis   • Hiatal  hernia   • Nausea   • Gastroesophageal reflux disease without esophagitis   • Seasonal allergic rhinitis   • UTI symptoms   • Cough   • Body aches   • Myalgia       ICD-10-CM ICD-9-CM   1. Adenocarcinoma of left lung (HCC)  C34.92 162.9   2. Seasonal allergic rhinitis, unspecified trigger  J30.2 477.9   3. Gastroesophageal reflux disease without esophagitis  K21.9 530.81   4. UTI symptoms  R39.9 788.99   5. Cough, unspecified type  R05.9 786.2   6. Body aches  R52 780.96   7. Myalgia  M79.10 729.1     Orders Placed This Encounter   Procedures   • Comprehensive Metabolic Panel     Order Specific Question:   Release to patient     Answer:   Routine Release   • CBC (No Diff)     Order Specific Question:   Release to patient     Answer:   Routine Release   • CK     Order Specific Question:   Release to patient     Answer:   Routine Release   • POC Urinalysis Dipstick     Order Specific Question:   Release to patient     Answer:   Routine Release   • POCT SARS-CoV-2 Antigen TARYN + Flu     Order Specific Question:   Release to patient     Answer:   Routine Release       Meds Ordered During Visit:  New Medications Ordered This Visit   Medications   • levocetirizine (Xyzal Allergy 24HR) 5 MG tablet     Sig: Take 0.5 tablets by mouth Every Evening.     Dispense:  45 tablet     Refill:  3     I reviewed GI, palliative care and oncology notes.  I encourage patient to follow-up with her specialists as planned.  I reviewed PDMP today.  Covid and flu tests were negative today. UA was also negative today.  We will hold on antibiotic therapy at this time, as I do not believe it is warranted.  I have recommended Xyzal to take daily for allergy relief in addition to her current medicine regimen.  We will also check labs today as above given her recent myalgias.     Answers for HPI/ROS submitted by the patient on 3/13/2023  Please describe your symptoms.: UTI recheck.  Have you had these symptoms before?: Yes  How long have you been  having these symptoms?: Greater than 2 weeks  What is the primary reason for your visit?: Other        Return if symptoms worsen or fail to improve, for Next scheduled follow up.          Referring Provider (if known): No ref. provider found      This document has been electronically signed by Camila Soto DO  March 15, 2023 11:16 EDT    Camila Soto DO, FACOI  [address] [place] [address] [phone] (office)    Part of this note may be an electronic transcription/translation of spoken language to printed text using the Dragon Dictation System.

## 2023-03-15 NOTE — PROGRESS NOTES
Venipuncture Blood Specimen Collection  Venipuncture performed in RIGHT ARM by Viry Maldonado RN with good hemostasis. Patient tolerated the procedure well without complications.   03/15/23   Viry Maldonado RN

## 2023-03-16 LAB
ALBUMIN SERPL-MCNC: 4.5 G/DL (ref 3.5–5.2)
ALBUMIN/GLOB SERPL: 1.7 G/DL
ALP SERPL-CCNC: 104 U/L (ref 39–117)
ALT SERPL W P-5'-P-CCNC: 47 U/L (ref 1–33)
ANION GAP SERPL CALCULATED.3IONS-SCNC: 9.3 MMOL/L (ref 5–15)
AST SERPL-CCNC: 54 U/L (ref 1–32)
BILIRUB SERPL-MCNC: 0.5 MG/DL (ref 0–1.2)
BUN SERPL-MCNC: 10 MG/DL (ref 8–23)
BUN/CREAT SERPL: 10.6 (ref 7–25)
CALCIUM SPEC-SCNC: 9.9 MG/DL (ref 8.6–10.5)
CHLORIDE SERPL-SCNC: 103 MMOL/L (ref 98–107)
CK SERPL-CCNC: 102 U/L (ref 20–180)
CO2 SERPL-SCNC: 26.7 MMOL/L (ref 22–29)
CREAT SERPL-MCNC: 0.94 MG/DL (ref 0.57–1)
DEPRECATED RDW RBC AUTO: 42.6 FL (ref 37–54)
EGFRCR SERPLBLD CKD-EPI 2021: 67.1 ML/MIN/1.73
ERYTHROCYTE [DISTWIDTH] IN BLOOD BY AUTOMATED COUNT: 14.4 % (ref 12.3–15.4)
GLOBULIN UR ELPH-MCNC: 2.6 GM/DL
GLUCOSE SERPL-MCNC: 97 MG/DL (ref 65–99)
HCT VFR BLD AUTO: 43.3 % (ref 34–46.6)
HGB BLD-MCNC: 13.5 G/DL (ref 12–15.9)
MCH RBC QN AUTO: 25.5 PG (ref 26.6–33)
MCHC RBC AUTO-ENTMCNC: 31.2 G/DL (ref 31.5–35.7)
MCV RBC AUTO: 81.7 FL (ref 79–97)
PLATELET # BLD AUTO: 372 10*3/MM3 (ref 140–450)
PMV BLD AUTO: 9.3 FL (ref 6–12)
POTASSIUM SERPL-SCNC: 4.1 MMOL/L (ref 3.5–5.2)
PROT SERPL-MCNC: 7.1 G/DL (ref 6–8.5)
RBC # BLD AUTO: 5.3 10*6/MM3 (ref 3.77–5.28)
SODIUM SERPL-SCNC: 139 MMOL/L (ref 136–145)
WBC NRBC COR # BLD: 6.38 10*3/MM3 (ref 3.4–10.8)

## 2023-04-17 ENCOUNTER — OFFICE VISIT (OUTPATIENT)
Dept: FAMILY MEDICINE CLINIC | Facility: CLINIC | Age: 68
End: 2023-04-17
Payer: MEDICARE

## 2023-04-17 VITALS
BODY MASS INDEX: 36.24 KG/M2 | TEMPERATURE: 97.3 F | WEIGHT: 217.8 LBS | DIASTOLIC BLOOD PRESSURE: 82 MMHG | OXYGEN SATURATION: 98 % | HEART RATE: 78 BPM | SYSTOLIC BLOOD PRESSURE: 118 MMHG

## 2023-04-17 DIAGNOSIS — K21.9 GASTROESOPHAGEAL REFLUX DISEASE WITHOUT ESOPHAGITIS: Chronic | ICD-10-CM

## 2023-04-17 DIAGNOSIS — J30.2 SEASONAL ALLERGIC RHINITIS, UNSPECIFIED TRIGGER: ICD-10-CM

## 2023-04-17 DIAGNOSIS — C34.92 ADENOCARCINOMA OF LEFT LUNG: Primary | Chronic | ICD-10-CM

## 2023-04-17 PROBLEM — M79.10 MYALGIA: Status: RESOLVED | Noted: 2023-03-15 | Resolved: 2023-04-17

## 2023-04-17 PROBLEM — R39.9 UTI SYMPTOMS: Status: RESOLVED | Noted: 2023-03-15 | Resolved: 2023-04-17

## 2023-04-17 PROBLEM — R52 BODY ACHES: Status: RESOLVED | Noted: 2023-03-15 | Resolved: 2023-04-17

## 2023-04-17 PROBLEM — R05.9 COUGH: Status: RESOLVED | Noted: 2023-03-15 | Resolved: 2023-04-17

## 2023-04-17 PROCEDURE — 1160F RVW MEDS BY RX/DR IN RCRD: CPT | Performed by: INTERNAL MEDICINE

## 2023-04-17 PROCEDURE — 1159F MED LIST DOCD IN RCRD: CPT | Performed by: INTERNAL MEDICINE

## 2023-04-17 PROCEDURE — 99214 OFFICE O/P EST MOD 30 MIN: CPT | Performed by: INTERNAL MEDICINE

## 2023-04-17 RX ORDER — DOCUSATE SODIUM 100 MG/1
100 CAPSULE, LIQUID FILLED ORAL 2 TIMES DAILY
COMMUNITY

## 2023-04-17 RX ORDER — PSEUDOEPHEDRINE HCL 120 MG/1
120 TABLET, FILM COATED, EXTENDED RELEASE ORAL
COMMUNITY
End: 2023-04-17

## 2023-04-17 RX ORDER — LEVALBUTEROL TARTRATE 45 UG/1
2 AEROSOL, METERED ORAL EVERY 4 HOURS PRN
Qty: 15 G | Refills: 5 | Status: SHIPPED | OUTPATIENT
Start: 2023-04-17

## 2023-04-17 RX ORDER — CALCIUM CITRATE/VITAMIN D3 125-62.5
1000 TABLET ORAL
COMMUNITY

## 2023-04-17 RX ORDER — LEVOCETIRIZINE DIHYDROCHLORIDE 5 MG/1
0.5 TABLET, FILM COATED ORAL DAILY
COMMUNITY
Start: 2023-03-15 | End: 2023-04-17

## 2023-04-17 NOTE — PROGRESS NOTES
Patient Name: Savi Villalpando Today's Date: 2023   Patient MRN / CSN: [id number] / [id number] Date of Encounter: 2023   Patient Age / : 67 y.o. / 1955 Encounter Provider: Camila Soto DO   Referring Physician: No ref. provider found          Savi is a 67 y.o. female who is being seen today for Follow-up      History of Present Illness     Savi presents today for follow up on Adenocarcinoma of the left lung, gerd and seasonal allergies. She has seen her specialists at [facility] since last visit. She also saw a holistic provider at [facility] since last visit and has started ginseng supplements. She reports feeling pretty well recently. She has noted xyzal has helped with allergies. She still has occasional coughing episodes where she feels like she loses her breath. She has been using advair twice daily and uses xopenex as needed.  Reflux has been well controlled with her current regimen including omeprazole.  She is preparing for EGD and colonoscopy at [facility] in early May.  She is also started acupuncture therapy to help with nausea and arthritis.    Allergies include:Fluad quadrivalent [influenza vac a&b sa adj quad], Metronidazole, Multi complete-iron [anti-oxidant], Other, Sertraline, Shellfish-derived products, and Sulfa antibiotics  Current Outpatient Medications   Medication Sig Dispense Refill   • benzonatate (TESSALON) 100 MG capsule Take 1 capsule by mouth 3 (Three) Times a Day As Needed for Cough.     • CALCIUM PO Take  by mouth.     • docusate sodium (COLACE) 100 MG capsule Take 1 capsule by mouth 2 (Two) Times a Day.     • dry mouth gel (BIOTENE ORALBALANCE) gel Apply  to the mouth or throat As Needed for Dry Mouth.     • Fluticasone-Salmeterol (ADVAIR/WIXELA) 250-50 MCG/ACT DISKUS INHALE 1 PUFF BY MOUTH TWICE DAILY, RINSE MOUTH WITH WATER AFTER USE TO REDUCE AFTERTASTE AND INCIDENCE OF CANDIDIASIS, DO NOT SWALLOW     • Hypertonic Nasal Wash (Sinus Rinse) pack into the nostril(s) as  directed by provider.     • levalbuterol (XOPENEX HFA) 45 MCG/ACT inhaler Inhale 2 puffs Every 4 (Four) Hours As Needed for Shortness of Air. 15 g 5   • levocetirizine (Xyzal Allergy 24HR) 5 MG tablet Take 0.5 tablets by mouth Every Evening. 45 tablet 3   • Multi Ginseng 1000 MG capsule Take 1,000 mg by mouth.     • omeprazole (priLOSEC) 20 MG capsule      • ondansetron (Zofran) 4 MG tablet Take 1 tablet by mouth 4 (Four) Times a Day As Needed for Nausea or Vomiting. 120 tablet 2   • oxyCODONE (OXY-IR) 5 MG capsule Take 1 capsule by mouth Every 4 (Four) Hours As Needed for Moderate Pain. Pt taking every 8hrs     • polyethylene glycol (MIRALAX) 17 g packet Take 17 g by mouth Daily.     • pseudoephedrine (SUDAFED) 120 MG 12 hr tablet Take 1 tablet by mouth Daily As Needed for Congestion.     • Selpercatinib (RETEVMO) 80 MG capsule Take 1 capsule by mouth 2 (Two) Times a Day.     • sodium chloride 3 % nebulizer solution Take 4 mL by nebulization As Needed for Cough.     • Wound Cleansers (WOUND WASH EX) Apply  topically.       No current facility-administered medications for this visit.     Past Medical History:   Diagnosis Date   • Allergic 1955   • Allergies    • Anxiety 1970   • Arthritis 2010   • Asthma 1956   • Cholelithiasis 1976   • Diverticulitis    • Diverticulosis 2022   • Elevated liver enzymes    • HL (hearing loss) 2011   • Lung cancer     stage 4   • Obesity 1997   • Rib fracture      Family History   Problem Relation Age of Onset   • Arthritis Mother    • Mental illness Mother    • Heart disease Father    • Alcohol abuse Sister    • Miscarriages / Stillbirths Daughter    • Asthma Maternal Grandmother    • Hearing loss Maternal Grandmother    • Cancer Maternal Uncle    • Breast cancer Neg Hx      Past Surgical History:   Procedure Laterality Date   • BILATERAL BREAST REDUCTION      3 lb removal   • BREAST EXCISIONAL BIOPSY Right 1989    benign   • BREAST SURGERY  2021   • CERVICAL FUSION      C567 partial  8   • CHOLECYSTECTOMY     • COSMETIC SURGERY  2021   • EYE SURGERY  2000   • HAND SURGERY      x3   • HYSTERECTOMY Bilateral 1999    total-benign   • LYMPH NODE DISSECTION      back of neck   • REDUCTION MAMMAPLASTY Bilateral 2019   • TRIGGER FINGER RELEASE       Social History     Substance and Sexual Activity   Alcohol Use Never     Social History     Tobacco Use   Smoking Status Never   Smokeless Tobacco Never   Tobacco Comments    Both parents smoked     Social History     Substance and Sexual Activity   Drug Use Never     Review of Systems   Constitutional: Negative for fever.   Respiratory: Positive for cough and shortness of breath.    Cardiovascular: Negative for chest pain.   Gastrointestinal:        Some upper abdominal pain at times.  No left lower quadrant pain recently.   Musculoskeletal: Positive for arthralgias.        Depression Assessment Review:  PHQ-9 Total Score:    Vital Signs & Measurements Taken This Encounter  /82 (BP Location: Left arm, Patient Position: Sitting, Cuff Size: Adult)   Pulse 78   Temp 97.3 °F (36.3 °C) (Temporal)   Wt 98.8 kg (217 lb 12.8 oz)   SpO2 98%   BMI 36.24 kg/m²    SpO2 Percentage    04/17/23 0922   SpO2: 98%          Physical Exam  Vitals reviewed.   Constitutional:       General: She is not in acute distress.  HENT:      Head: Normocephalic and atraumatic.   Eyes:      General: No scleral icterus.     Extraocular Movements: Extraocular movements intact.      Conjunctiva/sclera: Conjunctivae normal.      Pupils: Pupils are equal, round, and reactive to light.   Cardiovascular:      Rate and Rhythm: Normal rate and regular rhythm.   Pulmonary:      Effort: Pulmonary effort is normal. No respiratory distress.      Breath sounds: Normal breath sounds. No wheezing or rhonchi.   Abdominal:      Palpations: Abdomen is soft.      Tenderness: There is no abdominal tenderness. There is no guarding or rebound.   Musculoskeletal:         General: No swelling.       Cervical back: Neck supple. No tenderness.   Lymphadenopathy:      Cervical: No cervical adenopathy.   Skin:     General: Skin is warm and dry.      Coloration: Skin is not jaundiced.   Neurological:      Mental Status: She is alert.   Psychiatric:         Mood and Affect: Mood normal.         Behavior: Behavior normal.              Assessment & Plan  Patient Active Problem List   Diagnosis   • Adenocarcinoma of lung   • Diverticulosis   • Hiatal hernia   • Nausea   • Gastroesophageal reflux disease without esophagitis   • Seasonal allergic rhinitis       ICD-10-CM ICD-9-CM   1. Adenocarcinoma of left lung  C34.92 162.9   2. Seasonal allergic rhinitis, unspecified trigger  J30.2 477.9   3. Gastroesophageal reflux disease without esophagitis  K21.9 530.81     No orders of the defined types were placed in this encounter.      Meds Ordered During Visit:  New Medications Ordered This Visit   Medications   • levalbuterol (XOPENEX HFA) 45 MCG/ACT inhaler     Sig: Inhale 2 puffs Every 4 (Four) Hours As Needed for Shortness of Air.     Dispense:  15 g     Refill:  5     I reviewed notes from [facility]. I encouraged her to follow up with her specialists as planned. I will update xopenex inhaler for patient today. I encouraged her to continue her current medications and healthy lifestyle changes.     Return in about 2 months (around 6/17/2023), or if symptoms worsen or fail to improve, for Recheck.          Referring Provider (if known): No ref. provider found      This document has been electronically signed by Camila Soto DO  April 17, 2023 10:34 EDT    Camila Soto DO, FACOI  [address] [place] [address]  [phone] (office)    Part of this note may be an electronic transcription/translation of spoken language to printed text using the Dragon Dictation System.

## 2023-05-31 ENCOUNTER — OFFICE VISIT (OUTPATIENT)
Dept: FAMILY MEDICINE CLINIC | Facility: CLINIC | Age: 68
End: 2023-05-31

## 2023-05-31 VITALS
HEART RATE: 97 BPM | TEMPERATURE: 97.3 F | DIASTOLIC BLOOD PRESSURE: 82 MMHG | BODY MASS INDEX: 36.72 KG/M2 | WEIGHT: 220.4 LBS | OXYGEN SATURATION: 95 % | SYSTOLIC BLOOD PRESSURE: 118 MMHG | HEIGHT: 65 IN

## 2023-05-31 DIAGNOSIS — R30.0 BURNING WITH URINATION: Primary | ICD-10-CM

## 2023-05-31 DIAGNOSIS — N30.01 ACUTE CYSTITIS WITH HEMATURIA: ICD-10-CM

## 2023-05-31 LAB
BILIRUB BLD-MCNC: NEGATIVE MG/DL
CLARITY, POC: ABNORMAL
COLOR UR: YELLOW
GLUCOSE UR STRIP-MCNC: NEGATIVE MG/DL
KETONES UR QL: NEGATIVE
LEUKOCYTE EST, POC: ABNORMAL
NITRITE UR-MCNC: NEGATIVE MG/ML
PH UR: 6.5 [PH] (ref 5–8)
PROT UR STRIP-MCNC: NEGATIVE MG/DL
RBC # UR STRIP: ABNORMAL /UL
SP GR UR: 1 (ref 1–1.03)
UROBILINOGEN UR QL: ABNORMAL

## 2023-05-31 PROCEDURE — 87077 CULTURE AEROBIC IDENTIFY: CPT | Performed by: NURSE PRACTITIONER

## 2023-05-31 PROCEDURE — 87086 URINE CULTURE/COLONY COUNT: CPT | Performed by: NURSE PRACTITIONER

## 2023-05-31 PROCEDURE — 87186 SC STD MICRODIL/AGAR DIL: CPT | Performed by: NURSE PRACTITIONER

## 2023-05-31 RX ORDER — CEFDINIR 300 MG/1
300 CAPSULE ORAL 2 TIMES DAILY
Qty: 14 CAPSULE | Refills: 0 | Status: SHIPPED | OUTPATIENT
Start: 2023-05-31 | End: 2023-06-07

## 2023-05-31 NOTE — PROGRESS NOTES
Patient Name: Savi Villalpando Today's Date: 2023   Patient MRN / CSN: [id number] / [id number] Date of Encounter: 2023   Patient Age / : 67 y.o. / 1955 Encounter Provider: CHEIKH Mcgarry   Referring Physician: No ref. provider found          Savi is a 67 y.o. female who is being seen today for burning with urination      Urinary Tract Infection   This is a new problem. The current episode started in the past 7 days. The problem has been unchanged. The quality of the pain is described as burning. The pain is moderate. There has been no fever. Associated symptoms include urgency. She has tried nothing for the symptoms. The treatment provided no relief.       Allergies include:Fluad quadrivalent [influenza vac a&b sa adj quad], Metronidazole, Multi complete-iron [anti-oxidant], Other, Sertraline, Shellfish-derived products, and Sulfa antibiotics  Current Outpatient Medications   Medication Sig Dispense Refill   • benzonatate (TESSALON) 100 MG capsule Take 1 capsule by mouth 3 (Three) Times a Day As Needed for Cough.     • CALCIUM PO Take  by mouth.     • docusate sodium (COLACE) 100 MG capsule Take 1 capsule by mouth 2 (Two) Times a Day.     • dry mouth gel (BIOTENE ORALBALANCE) gel Apply  to the mouth or throat As Needed for Dry Mouth.     • Fluticasone-Salmeterol (ADVAIR/WIXELA) 250-50 MCG/ACT DISKUS INHALE 1 PUFF BY MOUTH TWICE DAILY, RINSE MOUTH WITH WATER AFTER USE TO REDUCE AFTERTASTE AND INCIDENCE OF CANDIDIASIS, DO NOT SWALLOW     • Hypertonic Nasal Wash (Sinus Rinse) pack into the nostril(s) as directed by provider.     • levalbuterol (XOPENEX HFA) 45 MCG/ACT inhaler Inhale 2 puffs Every 4 (Four) Hours As Needed for Shortness of Air. 15 g 5   • levocetirizine (Xyzal Allergy 24HR) 5 MG tablet Take 0.5 tablets by mouth Every Evening. 45 tablet 3   • Multi Ginseng 1000 MG capsule Take 1,000 mg by mouth.     • omeprazole (priLOSEC) 20 MG capsule      • ondansetron (Zofran) 4 MG tablet Take  1 tablet by mouth 4 (Four) Times a Day As Needed for Nausea or Vomiting. 120 tablet 2   • oxyCODONE (OXY-IR) 5 MG capsule Take 1 capsule by mouth Every 4 (Four) Hours As Needed for Moderate Pain. Pt taking every 8hrs     • polyethylene glycol (MIRALAX) 17 g packet Take 17 g by mouth Daily.     • pseudoephedrine (SUDAFED) 120 MG 12 hr tablet Take 1 tablet by mouth Daily As Needed for Congestion.     • Selpercatinib (RETEVMO) 80 MG capsule Take 1 capsule by mouth 2 (Two) Times a Day.     • sodium chloride 3 % nebulizer solution Take 4 mL by nebulization As Needed for Cough.     • Wound Cleansers (WOUND WASH EX) Apply  topically.     • cefdinir (OMNICEF) 300 MG capsule Take 1 capsule by mouth 2 (Two) Times a Day for 7 days. 14 capsule 0     No current facility-administered medications for this visit.     Past Medical History:   Diagnosis Date   • Allergic 1955   • Allergies    • Anxiety 1970   • Arthritis 2010   • Asthma 1956   • Cholelithiasis 1976   • Diverticulitis    • Diverticulosis 2022   • Elevated liver enzymes    • HL (hearing loss) 2011   • Lung cancer     stage 4   • Obesity 1997   • Rib fracture      Family History   Problem Relation Age of Onset   • Arthritis Mother    • Mental illness Mother    • Heart disease Father    • Alcohol abuse Sister    • Miscarriages / Stillbirths Daughter    • Asthma Maternal Grandmother    • Hearing loss Maternal Grandmother    • Cancer Maternal Uncle    • Breast cancer Neg Hx      Past Surgical History:   Procedure Laterality Date   • BILATERAL BREAST REDUCTION      3 lb removal   • BREAST EXCISIONAL BIOPSY Right 1989    benign   • BREAST SURGERY  2021   • CERVICAL FUSION      C567 partial 8   • CHOLECYSTECTOMY     • COSMETIC SURGERY  2021   • EYE SURGERY  2000   • HAND SURGERY      x3   • HYSTERECTOMY Bilateral 1999    total-benign   • LYMPH NODE DISSECTION      back of neck   • REDUCTION MAMMAPLASTY Bilateral 2019   • TRIGGER FINGER RELEASE       Social History  "    Substance and Sexual Activity   Alcohol Use Never     Social History     Tobacco Use   Smoking Status Never   Smokeless Tobacco Never   Tobacco Comments    Both parents smoked     Social History     Substance and Sexual Activity   Drug Use Never     Review of Systems   Constitutional: Negative.    HENT: Negative.    Eyes: Negative.    Respiratory: Negative.    Cardiovascular: Negative.    Gastrointestinal: Negative.    Genitourinary: Positive for dysuria and urgency.   Musculoskeletal: Negative.    Skin: Negative.    Allergic/Immunologic: Negative.    Neurological: Negative.    Hematological: Negative.    Psychiatric/Behavioral: Negative.         Depression Assessment Review:  PHQ-9 Total Score:    Vital Signs & Measurements Taken This Encounter  /82 (BP Location: Left arm, Patient Position: Sitting, Cuff Size: Large Adult)   Pulse 97   Temp 97.3 °F (36.3 °C) (Temporal)   Ht 165.1 cm (65\")   Wt 100 kg (220 lb 6.4 oz)   SpO2 95%   BMI 36.68 kg/m²    SpO2 Percentage    05/31/23 1312   SpO2: 95%        Physical Exam  Constitutional:       Appearance: Normal appearance.   HENT:      Right Ear: External ear normal.      Left Ear: External ear normal.      Nose: Nose normal.      Mouth/Throat:      Mouth: Mucous membranes are moist.   Cardiovascular:      Rate and Rhythm: Normal rate.      Pulses: Normal pulses.      Heart sounds: Normal heart sounds.   Pulmonary:      Effort: Pulmonary effort is normal.      Breath sounds: Normal breath sounds.   Abdominal:      General: Abdomen is flat.      Palpations: Abdomen is soft.   Musculoskeletal:         General: Normal range of motion.   Skin:     General: Skin is warm.   Neurological:      General: No focal deficit present.      Mental Status: She is alert and oriented to person, place, and time.   Psychiatric:         Mood and Affect: Mood normal.         Behavior: Behavior normal.          Fall Risk Assessment:  Fall Risk Assessment was completed, and " patient is at low risk for falls.    Assessment & Plan    -- She presents today with complaints of increased urinary frequency as well as burning with urination. UA is positive for leukocytes and blood. I will send for culture. I will start her on cefdinir and notify her of culture results.       Patient Active Problem List   Diagnosis   • Adenocarcinoma of lung   • Diverticulosis   • Hiatal hernia   • Nausea   • Gastroesophageal reflux disease without esophagitis   • Seasonal allergic rhinitis       ICD-10-CM ICD-9-CM   1. Burning with urination  R30.0 788.1   2. Acute cystitis with hematuria  N30.01 595.0     Orders Placed This Encounter   Procedures   • Urine Culture - Urine, Urine, Clean Catch   • POCT urinalysis dipstick, manual     Order Specific Question:   Release to patient     Answer:   Routine Release       Meds Ordered During Visit:  New Medications Ordered This Visit   Medications   • cefdinir (OMNICEF) 300 MG capsule     Sig: Take 1 capsule by mouth 2 (Two) Times a Day for 7 days.     Dispense:  14 capsule     Refill:  0       Follow up on file with Dr. Soto.            This document has been electronically signed by CHEIKH Mcgarry  May 31, 2023 13:58 EDT    CHEIKH Mcgarry  [address] [place] [address] [phone] (office)    Part of this note may be an electronic transcription/translation of spoken language to printed text using the Dragon Dictation System.

## 2023-06-02 ENCOUNTER — PATIENT ROUNDING (BHMG ONLY) (OUTPATIENT)
Dept: FAMILY MEDICINE CLINIC | Facility: CLINIC | Age: 68
End: 2023-06-02

## 2023-06-02 LAB — BACTERIA SPEC AEROBE CULT: ABNORMAL

## 2023-06-02 NOTE — PROGRESS NOTES
Urine culture is positive for bacteria. Since she was symptimatic she should finish her antibiotics.

## 2023-07-19 PROBLEM — R22.2 MASS OF BUTTOCK: Status: ACTIVE | Noted: 2023-07-19

## 2023-08-22 ENCOUNTER — OFFICE VISIT (OUTPATIENT)
Dept: FAMILY MEDICINE CLINIC | Facility: CLINIC | Age: 68
End: 2023-08-22
Payer: MEDICARE

## 2023-08-22 VITALS
BODY MASS INDEX: 37.28 KG/M2 | HEART RATE: 74 BPM | WEIGHT: 224 LBS | TEMPERATURE: 97.3 F | DIASTOLIC BLOOD PRESSURE: 82 MMHG | OXYGEN SATURATION: 97 % | SYSTOLIC BLOOD PRESSURE: 134 MMHG

## 2023-08-22 DIAGNOSIS — K21.9 GASTROESOPHAGEAL REFLUX DISEASE WITHOUT ESOPHAGITIS: Chronic | ICD-10-CM

## 2023-08-22 DIAGNOSIS — K44.9 HIATAL HERNIA: ICD-10-CM

## 2023-08-22 DIAGNOSIS — C34.92 ADENOCARCINOMA OF LEFT LUNG: Primary | Chronic | ICD-10-CM

## 2023-08-22 PROBLEM — R22.2 MASS OF BUTTOCK: Status: RESOLVED | Noted: 2023-07-19 | Resolved: 2023-08-22

## 2023-08-22 PROBLEM — R05.3 CHRONIC COUGH: Chronic | Status: ACTIVE | Noted: 2023-03-15

## 2023-08-22 PROBLEM — R05.3 CHRONIC COUGH: Status: ACTIVE | Noted: 2023-03-15

## 2023-08-22 PROCEDURE — 85027 COMPLETE CBC AUTOMATED: CPT | Performed by: INTERNAL MEDICINE

## 2023-08-22 PROCEDURE — 80053 COMPREHEN METABOLIC PANEL: CPT | Performed by: INTERNAL MEDICINE

## 2023-08-22 PROCEDURE — 1159F MED LIST DOCD IN RCRD: CPT | Performed by: INTERNAL MEDICINE

## 2023-08-22 PROCEDURE — 1160F RVW MEDS BY RX/DR IN RCRD: CPT | Performed by: INTERNAL MEDICINE

## 2023-08-22 PROCEDURE — 99214 OFFICE O/P EST MOD 30 MIN: CPT | Performed by: INTERNAL MEDICINE

## 2023-08-22 PROCEDURE — 36415 COLL VENOUS BLD VENIPUNCTURE: CPT | Performed by: INTERNAL MEDICINE

## 2023-08-22 RX ORDER — FAMOTIDINE 40 MG/1
40 TABLET, FILM COATED ORAL DAILY
Qty: 30 TABLET | Refills: 5 | Status: SHIPPED | OUTPATIENT
Start: 2023-08-22

## 2023-08-22 RX ORDER — GUAIFENESIN 600 MG/1
600 TABLET, EXTENDED RELEASE ORAL
COMMUNITY
Start: 2023-07-15

## 2023-08-22 NOTE — PROGRESS NOTES
Venipuncture Blood Specimen Collection  Venipuncture performed in RIGHT ARM by Viry Maldonado RN with good hemostasis. Patient tolerated the procedure well without complications.   08/22/23   Viry Maldonado RN

## 2023-08-22 NOTE — PROGRESS NOTES
Patient Name: Savi Villalpando Today's Date: 2023   Patient MRN / CSN: [id number] / [id number] Date of Encounter: 2023   Patient Age / : 67 y.o. / 1955 Encounter Provider: Camila Soto DO   Referring Physician: No ref. provider found          Savi is a 67 y.o. female who is being seen today for Follow-up and Lung Cancer      Lung Cancer  This is a chronic problem. The current episode started more than 1 month ago. Associated symptoms include abdominal pain, arthralgias, coughing and nausea. Pertinent negatives include no chest pain. Associated symptoms comments: Since last visit, the chronic cough is gotten worse.  Patient has followed up with her specialist at the [facility].  She had EGD and colonoscopy, with EGD showing large hiatal hernia.  She has been taking omeprazole twice daily but reports the cough is continued to worsen despite this.  She does not feel heartburn but reports chronic abdominal pain.  The cough creates hoarseness at times.  She is added Mucinex with some relief.  She is planning to update CT scan images soon at the [facility] and follow-up with her oncologist thereafter.     Allergies include:Fluad quadrivalent [influenza vac a&b sa adj quad], Metronidazole, Multi complete-iron [anti-oxidant], Other, Sertraline, Shellfish-derived products, and Sulfa antibiotics  Current Outpatient Medications   Medication Sig Dispense Refill    CALCIUM PO Take  by mouth.      Fluticasone-Salmeterol (ADVAIR/WIXELA) 250-50 MCG/ACT DISKUS INHALE 1 PUFF BY MOUTH TWICE DAILY, RINSE MOUTH WITH WATER AFTER USE TO REDUCE AFTERTASTE AND INCIDENCE OF CANDIDIASIS, DO NOT SWALLOW      guaiFENesin (MUCINEX) 600 MG 12 hr tablet Take 1 tablet by mouth.      levalbuterol (XOPENEX HFA) 45 MCG/ACT inhaler Inhale 2 puffs Every 4 (Four) Hours As Needed for Shortness of Air. 15 g 5    levocetirizine (Xyzal Allergy 24HR) 5 MG tablet Take 0.5 tablets by mouth Every Evening. 45 tablet 3    Multi Ginseng 1000 MG  capsule Take 1,000 mg by mouth.      omeprazole (priLOSEC) 20 MG capsule 1 capsule 2 (Two) Times a Day.      ondansetron (Zofran) 4 MG tablet Take 1 tablet by mouth 4 (Four) Times a Day As Needed for Nausea or Vomiting. 120 tablet 2    oxyCODONE (OXY-IR) 5 MG capsule Take 1 capsule by mouth Every 4 (Four) Hours As Needed for Moderate Pain. Pt taking every 8hrs      polyethylene glycol (MIRALAX) 17 g packet Take 17 g by mouth Daily.      Selpercatinib (RETEVMO) 80 MG capsule Take 1 capsule by mouth 2 (Two) Times a Day.      famotidine (Pepcid) 40 MG tablet Take 1 tablet by mouth Daily. Take at least 2 hours after Retevmo. 30 tablet 5     No current facility-administered medications for this visit.     Past Medical History:   Diagnosis Date    Allergic 1956    Allergies     Anxiety 1970    Arthritis 2010    Asthma 1956    Cholelithiasis 1976    Depression 1979    Post partum    Diverticulitis     Diverticulitis of colon Nov. 2022    Diverticulosis 2022    Elevated liver enzymes     GERD (gastroesophageal reflux disease) 2023    HL (hearing loss) 2011    Lung cancer     stage 4    Obesity 1997    PONV (postoperative nausea and vomiting) 1990?    Ever since unless given anti-nausea before.    Rectal bleeding Nov. 2022    Rib fracture     Urinary tract infection 2023     Family History   Problem Relation Age of Onset    Arthritis Mother     Mental illness Mother     Heart disease Father     Alcohol abuse Sister     Drug abuse Sister     Miscarriages / Stillbirths Daughter         Miscarriage    Asthma Maternal Grandmother     Hearing loss Maternal Grandmother     Cancer Maternal Uncle     Breast cancer Neg Hx      Past Surgical History:   Procedure Laterality Date    BILATERAL BREAST REDUCTION      3 lb removal    BREAST BIOPSY  1990    It was a fibrous mass about golf ball sized    BREAST CYST ASPIRATION  1990    Noted above    BREAST EXCISIONAL BIOPSY Right 1989    benign    BREAST SURGERY  2021    CERVICAL FUSION       C567 partial 8    CHOLECYSTECTOMY      COLONOSCOPY  2023 July    COSMETIC SURGERY  2021    EYE SURGERY  2000    HAND SURGERY      x3    HYSTERECTOMY Bilateral 1999    total-benign    LYMPH NODE DISSECTION      back of neck    REDUCTION MAMMAPLASTY Bilateral 2019    TRIGGER FINGER RELEASE       Social History     Substance and Sexual Activity   Alcohol Use Not Currently    Alcohol/week: 3.0 standard drinks    Types: 3 Glasses of wine per week    Comment: This is for pain management.     Social History     Tobacco Use   Smoking Status Never   Smokeless Tobacco Never   Tobacco Comments    Both parents smoked     Social History     Substance and Sexual Activity   Drug Use Never     Review of Systems   HENT:  Positive for voice change.    Respiratory:  Positive for cough and shortness of breath.    Cardiovascular:  Negative for chest pain.   Gastrointestinal:  Positive for abdominal pain and nausea.        Ab pain all the time. Planning to have an injection for this at her upcoming appt at [place].    Musculoskeletal:  Positive for arthralgias.      Depression Assessment Review:  PHQ-9 Total Score:    Vital Signs & Measurements Taken This Encounter  /82 (BP Location: Left arm, Patient Position: Sitting, Cuff Size: Adult)   Pulse 74   Temp 97.3 øF (36.3 øC) (Temporal)   Wt 102 kg (224 lb)   SpO2 97%   BMI 37.28 kg/mý    SpO2 Percentage    08/22/23 0846   SpO2: 97%           Physical Exam  Vitals reviewed.   Constitutional:       General: She is not in acute distress.  HENT:      Head: Normocephalic and atraumatic.   Eyes:      General: No scleral icterus.     Extraocular Movements: Extraocular movements intact.      Conjunctiva/sclera: Conjunctivae normal.      Pupils: Pupils are equal, round, and reactive to light.   Cardiovascular:      Rate and Rhythm: Normal rate and regular rhythm.   Pulmonary:      Effort: Pulmonary effort is normal. No respiratory distress.      Breath sounds: No wheezing or rhonchi.       Comments: Decreased breath sounds at the left base.  Frequent, dry hacking cough.  Abdominal:      Palpations: Abdomen is soft.      Tenderness: There is no guarding or rebound.   Musculoskeletal:         General: No swelling.      Cervical back: Neck supple. No tenderness.   Lymphadenopathy:      Cervical: No cervical adenopathy.   Skin:     General: Skin is warm and dry.      Coloration: Skin is not jaundiced.   Neurological:      Mental Status: She is alert.   Psychiatric:         Mood and Affect: Mood normal.         Behavior: Behavior normal.         Thought Content: Thought content normal.         Judgment: Judgment normal.            Assessment & Plan  Patient Active Problem List   Diagnosis    Adenocarcinoma of lung    Diverticulosis    Nausea    Gastroesophageal reflux disease without esophagitis    Seasonal allergic rhinitis    Chronic cough    Hiatal hernia       ICD-10-CM ICD-9-CM   1. Adenocarcinoma of left lung  C34.92 162.9   2. Gastroesophageal reflux disease without esophagitis  K21.9 530.81   3. Hiatal hernia  K44.9 553.3     Orders Placed This Encounter   Procedures    Comprehensive Metabolic Panel     Order Specific Question:   Release to patient     Answer:   Routine Release [[id number]]    CBC (No Diff)     Order Specific Question:   Release to patient     Answer:   Routine Release [[id number]]       Meds Ordered During Visit:  New Medications Ordered This Visit   Medications    famotidine (Pepcid) 40 MG tablet     Sig: Take 1 tablet by mouth Daily. Take at least 2 hours after Retevmo.     Dispense:  30 tablet     Refill:  5       I reviewed palliative care, GI, rehab, general surgery and onc notes. I encourage patient to follow up with her specialists as planned.  I recommended adding Pepcid midday to see if this helps alleviate the cough.  We will continue omeprazole as previously prescribed.  I encourage patient to take the Pepcid 2 hours after her morning Retevmo and 10 hours prior to the  next Retevmo dose.  We will update CBC and CMP today.    Return in about 2 months (around 10/22/2023), or if symptoms worsen or fail to improve, for Recheck.          Referring Provider (if known): No ref. provider found      This document has been electronically signed by Camila Soto DO  August 29, 2023 12:09 EDT    Camila Soto DO, FACOI  [address] [place] [address]  [phone] (office)    Part of this note may be an electronic transcription/translation of spoken language to printed text using the Dragon Dictation System.Answers submitted by the patient for this visit:  Other (Submitted on 8/15/2023)  Please describe your symptoms.: Stage IV Lung Cancer and related issues.  Have you had these symptoms before?: Yes  How long have you been having these symptoms?: Greater than 2 weeks  Please list any medications you are currently taking for this condition.: Several  Please describe any probable cause for these symptoms. : Cancer related  Primary Reason for Visit (Submitted on 8/15/2023)  What is the primary reason for your visit?: Other

## 2023-08-23 LAB
ALBUMIN SERPL-MCNC: 4.7 G/DL (ref 3.5–5.2)
ALBUMIN/GLOB SERPL: 2 G/DL
ALP SERPL-CCNC: 96 U/L (ref 39–117)
ALT SERPL W P-5'-P-CCNC: 21 U/L (ref 1–33)
ANION GAP SERPL CALCULATED.3IONS-SCNC: 11 MMOL/L (ref 5–15)
AST SERPL-CCNC: 29 U/L (ref 1–32)
BILIRUB SERPL-MCNC: 0.5 MG/DL (ref 0–1.2)
BUN SERPL-MCNC: 11 MG/DL (ref 8–23)
BUN/CREAT SERPL: 11.8 (ref 7–25)
CALCIUM SPEC-SCNC: 9.7 MG/DL (ref 8.6–10.5)
CHLORIDE SERPL-SCNC: 102 MMOL/L (ref 98–107)
CO2 SERPL-SCNC: 25 MMOL/L (ref 22–29)
CREAT SERPL-MCNC: 0.93 MG/DL (ref 0.57–1)
DEPRECATED RDW RBC AUTO: 46.6 FL (ref 37–54)
EGFRCR SERPLBLD CKD-EPI 2021: 67.9 ML/MIN/1.73
ERYTHROCYTE [DISTWIDTH] IN BLOOD BY AUTOMATED COUNT: 15.8 % (ref 12.3–15.4)
GLOBULIN UR ELPH-MCNC: 2.3 GM/DL
GLUCOSE SERPL-MCNC: 87 MG/DL (ref 65–99)
HCT VFR BLD AUTO: 41.7 % (ref 34–46.6)
HGB BLD-MCNC: 13.6 G/DL (ref 12–15.9)
MCH RBC QN AUTO: 26.5 PG (ref 26.6–33)
MCHC RBC AUTO-ENTMCNC: 32.6 G/DL (ref 31.5–35.7)
MCV RBC AUTO: 81.3 FL (ref 79–97)
PLATELET # BLD AUTO: 379 10*3/MM3 (ref 140–450)
PMV BLD AUTO: 9.8 FL (ref 6–12)
POTASSIUM SERPL-SCNC: 4.6 MMOL/L (ref 3.5–5.2)
PROT SERPL-MCNC: 7 G/DL (ref 6–8.5)
RBC # BLD AUTO: 5.13 10*6/MM3 (ref 3.77–5.28)
SODIUM SERPL-SCNC: 138 MMOL/L (ref 136–145)
WBC NRBC COR # BLD: 5.42 10*3/MM3 (ref 3.4–10.8)

## 2023-09-25 ENCOUNTER — OFFICE VISIT (OUTPATIENT)
Dept: FAMILY MEDICINE CLINIC | Facility: CLINIC | Age: 68
End: 2023-09-25

## 2023-09-25 VITALS
TEMPERATURE: 97.3 F | WEIGHT: 224 LBS | SYSTOLIC BLOOD PRESSURE: 152 MMHG | BODY MASS INDEX: 37.32 KG/M2 | HEIGHT: 65 IN | HEART RATE: 94 BPM | DIASTOLIC BLOOD PRESSURE: 82 MMHG | RESPIRATION RATE: 18 BRPM | OXYGEN SATURATION: 97 %

## 2023-09-25 DIAGNOSIS — C34.92 ADENOCARCINOMA OF LEFT LUNG: Chronic | ICD-10-CM

## 2023-09-25 DIAGNOSIS — C34.92 NON-SMALL CELL CARCINOMA OF LEFT LUNG METASTATIC TO ABDOMEN: ICD-10-CM

## 2023-09-25 DIAGNOSIS — C34.92 METASTATIC PRIMARY LUNG CANCER, LEFT: ICD-10-CM

## 2023-09-25 DIAGNOSIS — J01.00 ACUTE MAXILLARY SINUSITIS, RECURRENCE NOT SPECIFIED: Primary | ICD-10-CM

## 2023-09-25 DIAGNOSIS — C79.89 NON-SMALL CELL CARCINOMA OF LEFT LUNG METASTATIC TO ABDOMEN: ICD-10-CM

## 2023-09-25 PROCEDURE — 1159F MED LIST DOCD IN RCRD: CPT | Performed by: INTERNAL MEDICINE

## 2023-09-25 PROCEDURE — 1160F RVW MEDS BY RX/DR IN RCRD: CPT | Performed by: INTERNAL MEDICINE

## 2023-09-25 PROCEDURE — 99214 OFFICE O/P EST MOD 30 MIN: CPT | Performed by: INTERNAL MEDICINE

## 2023-09-25 RX ORDER — AMOXICILLIN AND CLAVULANATE POTASSIUM 875; 125 MG/1; MG/1
1 TABLET, FILM COATED ORAL 2 TIMES DAILY
Qty: 20 TABLET | Refills: 0 | Status: SHIPPED | OUTPATIENT
Start: 2023-09-25

## 2023-09-25 RX ORDER — PEPSIN
POWDER (GRAM) MISCELLANEOUS AS NEEDED
COMMUNITY

## 2023-09-25 NOTE — PROGRESS NOTES
Patient Name: Savi Villalpando Today's Date: 2023   Patient MRN / CSN: [id number] / [id number] Date of Encounter: 2023   Patient Age / : 67 y.o. / 1955 Encounter Provider: Camila Soto DO   Referring Physician: No ref. provider found          Savi is a 67 y.o. female who is being seen today for Sore Throat and Bleeding/Bruising (Back side of her head, patient states brain tumors were found last month and she had to have a procedure done and ever since then she has had sore throat. Patient states she had something similar to this before and it was am infection draining from her head to her throat area.)      History of Present Illness    Savi presents today for an acute visit with complaints of sore throat, left earache and sinus pain which is worsened over the past 2 to 3 weeks.  She has a medical history including adenocarcinoma of the left lung, treated at the [facility], and found to have metastatic disease on her latest scans done last month.  She underwent gamma knife surgery to remove the brain mets last month.  She was also found to have 2 lesions in the abdomen, 1 on the left adrenal gland and an irregular, spiculated lymph node in the right lower quadrant.  Retevmo therapy has been stopped and she is planning to go back to the [facility] later this week to discuss starting new oncologic treatments.  She reports that she did not qualify for a clinical trial with her current metastatic disease.  She is concerned that they will not start oncologic therapy until acute infection is cleared.    Allergies include:Fluad quadrivalent [influenza vac a&b sa adj quad], Metronidazole, Multi complete-iron [anti-oxidant], Other, Sertraline, Shellfish-derived products, and Sulfa antibiotics  Current Outpatient Medications   Medication Sig Dispense Refill    CALCIUM PO Take  by mouth.      famotidine (Pepcid) 40 MG tablet Take 1 tablet by mouth Daily. Take at least 2 hours after Retevmo. 30  tablet 5    Fluticasone-Salmeterol (ADVAIR/WIXELA) 250-50 MCG/ACT DISKUS INHALE 1 PUFF BY MOUTH TWICE DAILY, RINSE MOUTH WITH WATER AFTER USE TO REDUCE AFTERTASTE AND INCIDENCE OF CANDIDIASIS, DO NOT SWALLOW      levalbuterol (XOPENEX HFA) 45 MCG/ACT inhaler Inhale 2 puffs Every 4 (Four) Hours As Needed for Shortness of Air. 15 g 5    levocetirizine (Xyzal Allergy 24HR) 5 MG tablet Take 0.5 tablets by mouth Every Evening. 45 tablet 3    omeprazole (priLOSEC) 20 MG capsule 1 capsule 2 (Two) Times a Day.      ondansetron (Zofran) 4 MG tablet Take 1 tablet by mouth 4 (Four) Times a Day As Needed for Nausea or Vomiting. 120 tablet 2    oxyCODONE (OXY-IR) 5 MG capsule Take 1 capsule by mouth Every 4 (Four) Hours As Needed for Moderate Pain. Pt taking every 8hrs      Pepsin powder Use As Needed.      polyethylene glycol (MIRALAX) 17 g packet Take 17 g by mouth Daily.      amoxicillin-clavulanate (AUGMENTIN) 875-125 MG per tablet Take 1 tablet by mouth 2 (Two) Times a Day. 20 tablet 0    Benzonatate (TESSALON PERLES PO) Take 100 mg by mouth 4 (Four) Times a Day As Needed (cough).      guaiFENesin (MUCINEX) 600 MG 12 hr tablet Take 1 tablet by mouth. (Patient not taking: Reported on 9/25/2023)      Multi Ginseng 1000 MG capsule Take 1,000 mg by mouth.      Selpercatinib (RETEVMO) 80 MG capsule Take 1 capsule by mouth 2 (Two) Times a Day. (Patient not taking: Reported on 9/25/2023)       No current facility-administered medications for this visit.     Past Medical History:   Diagnosis Date    Allergic 1955    Allergies     Anxiety 1970    Arthritis 2010    Asthma 1956    Cholelithiasis 1976    Depression 1979    Post partum    Diverticulitis     Diverticulitis of colon Nov. 2022    Diverticulosis 2022    Elevated liver enzymes     GERD (gastroesophageal reflux disease) 2023    HL (hearing loss) 2011    Lung cancer     stage 4    Obesity 1997    PONV (postoperative nausea and vomiting) 1990?    Ever since unless given  anti-nausea before.    Rectal bleeding Nov. 2022    Rib fracture     Urinary tract infection 2023     Family History   Problem Relation Age of Onset    Arthritis Mother     Mental illness Mother     Heart disease Father     Alcohol abuse Sister     Drug abuse Sister     Miscarriages / Stillbirths Daughter         Miscarriage    Asthma Maternal Grandmother     Hearing loss Maternal Grandmother     Cancer Maternal Uncle     Breast cancer Neg Hx      Past Surgical History:   Procedure Laterality Date    BILATERAL BREAST REDUCTION      3 lb removal    BRAIN SURGERY N/A     Gamma knife surgery to remove brain mets    BREAST BIOPSY  1990    It was a fibrous mass about golf ball sized    BREAST CYST ASPIRATION  1990    Noted above    BREAST EXCISIONAL BIOPSY Right 1989    benign    BREAST SURGERY  2021    CERVICAL FUSION      C567 partial 8    CHOLECYSTECTOMY      COLONOSCOPY  2023 July    COSMETIC SURGERY  2021    EYE SURGERY  2000    HAND SURGERY      x3    HYSTERECTOMY Bilateral 1999    total-benign    LYMPH NODE DISSECTION      back of neck    REDUCTION MAMMAPLASTY Bilateral 2019    TRIGGER FINGER RELEASE       Social History     Substance and Sexual Activity   Alcohol Use Not Currently    Alcohol/week: 3.0 standard drinks    Types: 3 Glasses of wine per week    Comment: This is for pain management.     Social History     Tobacco Use   Smoking Status Never   Smokeless Tobacco Never   Tobacco Comments    Both parents smoked     Social History     Substance and Sexual Activity   Drug Use Never     Review of Systems   Constitutional:  Positive for fatigue. Negative for fever.   HENT:  Positive for congestion, ear pain, sinus pressure, sinus pain and sore throat.    Respiratory:  Positive for cough.    Gastrointestinal:         Patient reports abdominal pain is better since restarting probiotics      Depression Assessment Review:  PHQ-9 Total Score:    Vital Signs & Measurements Taken This Encounter  /82 (BP  "Location: Left arm, Patient Position: Sitting, Cuff Size: Adult)   Pulse 94   Temp 97.3 °F (36.3 °C) (Temporal)   Resp 18   Ht 165.1 cm (65\")   Wt 102 kg (224 lb)   SpO2 97%   BMI 37.28 kg/m²    SpO2 Percentage    09/25/23 1500   SpO2: 97%          Physical Exam  Vitals reviewed.   Constitutional:       General: She is not in acute distress.  HENT:      Head: Normocephalic and atraumatic.      Right Ear: Tympanic membrane normal.      Ears:      Comments: Left maxillary sinus tenderness to palpation.  Left tympanic membrane is bulging, with mucoid effusion.     Mouth/Throat:      Mouth: Mucous membranes are moist.      Pharynx: Posterior oropharyngeal erythema present. No oropharyngeal exudate.   Eyes:      General: No scleral icterus.     Extraocular Movements: Extraocular movements intact.      Conjunctiva/sclera: Conjunctivae normal.      Pupils: Pupils are equal, round, and reactive to light.   Cardiovascular:      Rate and Rhythm: Normal rate and regular rhythm.   Pulmonary:      Effort: Pulmonary effort is normal. No respiratory distress.      Breath sounds: Normal breath sounds. No wheezing or rhonchi.   Musculoskeletal:         General: No swelling.      Cervical back: Neck supple. Tenderness present.   Lymphadenopathy:      Cervical: Cervical adenopathy present.   Skin:     General: Skin is warm and dry.      Coloration: Skin is not jaundiced.   Neurological:      Mental Status: She is alert.   Psychiatric:         Mood and Affect: Mood normal.         Behavior: Behavior normal.      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 Acute " maxillary sinusitis, recurrence not specified  J01.00 461.0   2. Adenocarcinoma of left lung  C34.92 162.9   3. Metastatic primary lung cancer, left  C34.92 162.9   4. 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      Meds Ordered During Visit:  New Medications Ordered This Visit   Medications    amoxicillin-clavulanate (AUGMENTIN) 875-125 MG per tablet     Sig: Take 1 tablet by mouth 2 (Two) Times a Day.     Dispense:  20 tablet     Refill:  0     I recommended Augmentin twice daily for 10 days with food.  I reviewed oncology notes and recent imaging studies.  I encouraged patient to follow-up with her oncologist as planned.  We will plan to meet back in about 3 weeks, as previously scheduled after oncology evaluation, or certainly sooner if needed.    Return if symptoms worsen or fail to improve, for Next scheduled follow up.          Referring Provider (if known): No ref. provider found      This document has been electronically signed by Camila Soto DO  September 25, 2023 18:51 EDT    Camila Soto DO, FACOI  [address] [place] [address]  [phone] (office)    Part of this note may be an electronic transcription/translation of spoken language to printed text using the Dragon Dictation System.

## 2023-10-03 ENCOUNTER — TRANSCRIBE ORDERS (OUTPATIENT)
Dept: FAMILY MEDICINE CLINIC | Facility: CLINIC | Age: 68
End: 2023-10-03
Payer: MEDICARE

## 2023-10-03 DIAGNOSIS — C34.32 MALIGNANT NEOPLASM OF LOWER LOBE BRONCHUS, LEFT: Primary | ICD-10-CM

## 2023-10-09 ENCOUNTER — CLINICAL SUPPORT (OUTPATIENT)
Dept: FAMILY MEDICINE CLINIC | Facility: CLINIC | Age: 68
End: 2023-10-09
Payer: MEDICARE

## 2023-10-09 DIAGNOSIS — C34.32 MALIGNANT NEOPLASM OF LOWER LOBE BRONCHUS, LEFT: ICD-10-CM

## 2023-10-09 PROCEDURE — 80053 COMPREHEN METABOLIC PANEL: CPT | Performed by: INTERNAL MEDICINE

## 2023-10-09 PROCEDURE — 85025 COMPLETE CBC W/AUTO DIFF WBC: CPT | Performed by: INTERNAL MEDICINE

## 2023-10-09 PROCEDURE — 36415 COLL VENOUS BLD VENIPUNCTURE: CPT | Performed by: INTERNAL MEDICINE

## 2023-10-09 NOTE — PROGRESS NOTES
Venipuncture Blood Specimen Collection  Venipuncture performed in Left arm by Alexandra Shahid MA with good hemostasis. Patient tolerated the procedure well without complications.   10/09/23   Alexandra Shahid MA

## 2023-10-10 LAB
ALBUMIN SERPL-MCNC: 4.3 G/DL (ref 3.5–5.2)
ALBUMIN/GLOB SERPL: 1.8 G/DL
ALP SERPL-CCNC: 102 U/L (ref 39–117)
ALT SERPL W P-5'-P-CCNC: 21 U/L (ref 1–33)
ANION GAP SERPL CALCULATED.3IONS-SCNC: 10.7 MMOL/L (ref 5–15)
AST SERPL-CCNC: 32 U/L (ref 1–32)
BASOPHILS # BLD AUTO: 0.04 10*3/MM3 (ref 0–0.2)
BASOPHILS NFR BLD AUTO: 0.7 % (ref 0–1.5)
BILIRUB SERPL-MCNC: 0.3 MG/DL (ref 0–1.2)
BUN SERPL-MCNC: 14 MG/DL (ref 8–23)
BUN/CREAT SERPL: 12.6 (ref 7–25)
CALCIUM SPEC-SCNC: 8.9 MG/DL (ref 8.6–10.5)
CHLORIDE SERPL-SCNC: 100 MMOL/L (ref 98–107)
CO2 SERPL-SCNC: 26.3 MMOL/L (ref 22–29)
CREAT SERPL-MCNC: 1.11 MG/DL (ref 0.57–1)
DEPRECATED RDW RBC AUTO: 41.1 FL (ref 37–54)
EGFRCR SERPLBLD CKD-EPI 2021: 54.9 ML/MIN/1.73
EOSINOPHIL # BLD AUTO: 0.04 10*3/MM3 (ref 0–0.4)
EOSINOPHIL NFR BLD AUTO: 0.7 % (ref 0.3–6.2)
ERYTHROCYTE [DISTWIDTH] IN BLOOD BY AUTOMATED COUNT: 14.4 % (ref 12.3–15.4)
GLOBULIN UR ELPH-MCNC: 2.4 GM/DL
GLUCOSE SERPL-MCNC: 69 MG/DL (ref 65–99)
HCT VFR BLD AUTO: 44.7 % (ref 34–46.6)
HGB BLD-MCNC: 14.9 G/DL (ref 12–15.9)
IMM GRANULOCYTES # BLD AUTO: 0.01 10*3/MM3 (ref 0–0.05)
IMM GRANULOCYTES NFR BLD AUTO: 0.2 % (ref 0–0.5)
LYMPHOCYTES # BLD AUTO: 2.69 10*3/MM3 (ref 0.7–3.1)
LYMPHOCYTES NFR BLD AUTO: 49.6 % (ref 19.6–45.3)
MCH RBC QN AUTO: 26.9 PG (ref 26.6–33)
MCHC RBC AUTO-ENTMCNC: 33.3 G/DL (ref 31.5–35.7)
MCV RBC AUTO: 80.7 FL (ref 79–97)
MONOCYTES # BLD AUTO: 0.15 10*3/MM3 (ref 0.1–0.9)
MONOCYTES NFR BLD AUTO: 2.8 % (ref 5–12)
NEUTROPHILS NFR BLD AUTO: 2.49 10*3/MM3 (ref 1.7–7)
NEUTROPHILS NFR BLD AUTO: 46 % (ref 42.7–76)
NRBC BLD AUTO-RTO: 0 /100 WBC (ref 0–0.2)
PLATELET # BLD AUTO: 565 10*3/MM3 (ref 140–450)
PMV BLD AUTO: 9.9 FL (ref 6–12)
POTASSIUM SERPL-SCNC: 5 MMOL/L (ref 3.5–5.2)
PROT SERPL-MCNC: 6.7 G/DL (ref 6–8.5)
RBC # BLD AUTO: 5.54 10*6/MM3 (ref 3.77–5.28)
SODIUM SERPL-SCNC: 137 MMOL/L (ref 136–145)
WBC NRBC COR # BLD: 5.42 10*3/MM3 (ref 3.4–10.8)

## 2023-10-12 ENCOUNTER — OFFICE VISIT (OUTPATIENT)
Dept: FAMILY MEDICINE CLINIC | Facility: CLINIC | Age: 68
End: 2023-10-12
Payer: MEDICARE

## 2023-10-12 VITALS
RESPIRATION RATE: 16 BRPM | HEIGHT: 65 IN | DIASTOLIC BLOOD PRESSURE: 80 MMHG | BODY MASS INDEX: 37.82 KG/M2 | OXYGEN SATURATION: 97 % | WEIGHT: 227 LBS | TEMPERATURE: 97.1 F | HEART RATE: 71 BPM | SYSTOLIC BLOOD PRESSURE: 134 MMHG

## 2023-10-12 DIAGNOSIS — R31.9 URINARY TRACT INFECTION WITH HEMATURIA, SITE UNSPECIFIED: Primary | ICD-10-CM

## 2023-10-12 DIAGNOSIS — G44.301 INTRACTABLE POST-TRAUMATIC HEADACHE, UNSPECIFIED CHRONICITY PATTERN: ICD-10-CM

## 2023-10-12 DIAGNOSIS — N39.0 URINARY TRACT INFECTION WITH HEMATURIA, SITE UNSPECIFIED: Primary | ICD-10-CM
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
## 2023-10-12 DIAGNOSIS — R79.89 ELEVATED SERUM CREATININE: ICD-10-CM

## 2023-10-12 LAB
BILIRUB BLD-MCNC: NEGATIVE MG/DL
CLARITY, POC: ABNORMAL
COLOR UR: YELLOW
EXPIRATION DATE: ABNORMAL
GLUCOSE UR STRIP-MCNC: NEGATIVE MG/DL
KETONES UR QL: NEGATIVE
LEUKOCYTE EST, POC: ABNORMAL
Lab: ABNORMAL
NITRITE UR-MCNC: NEGATIVE MG/ML
PH UR: 6 [PH] (ref 5–8)
PROT UR STRIP-MCNC: ABNORMAL MG/DL
RBC # UR STRIP: ABNORMAL /UL
SP GR UR: 1.01 (ref 1–1.03)
UROBILINOGEN UR QL: NORMAL

## 2023-10-12 PROCEDURE — 87086 URINE CULTURE/COLONY COUNT: CPT | Performed by: NURSE PRACTITIONER

## 2023-10-12 PROCEDURE — 87088 URINE BACTERIA CULTURE: CPT | Performed by: NURSE PRACTITIONER

## 2023-10-12 PROCEDURE — 80048 BASIC METABOLIC PNL TOTAL CA: CPT | Performed by: NURSE PRACTITIONER

## 2023-10-12 PROCEDURE — 87186 SC STD MICRODIL/AGAR DIL: CPT | Performed by: NURSE PRACTITIONER

## 2023-10-12 RX ORDER — NITROFURANTOIN 25; 75 MG/1; MG/1
100 CAPSULE ORAL 2 TIMES DAILY
Qty: 20 CAPSULE | Refills: 0 | Status: SHIPPED | OUTPATIENT
Start: 2023-10-12

## 2023-10-12 NOTE — PROGRESS NOTES
"Subjective   Savi Villalpando is a 67 y.o. female.     Chief Complaint   Patient presents with    Urinary Tract Infection    fall    Headache       History of Present Illness  She presents with c/o abnormal odor to her urine that started 3 days ago. She states she developed urinary frequency last night. She c/o an itchy burny feeling. She states she fell and hit her face on a rug last night and has a pounding headache this morning. She took her pain medicine this morning and it hasn't helped yet. She states her temperature is normal but high for her. She hit her left wrist, her right knee.        The following portions of the patient's history were reviewed and updated as appropriate: allergies, current medications, past family history, past medical history, past social history, past surgical history and problem list.    Review of Systems   Constitutional:  Negative for fatigue and fever.   Respiratory:  Positive for cough and shortness of breath (stable). Negative for chest tightness.    Cardiovascular:  Negative for chest pain, palpitations and leg swelling.   Gastrointestinal:  Positive for diarrhea (diarrhea, related to treatment). Negative for abdominal pain, blood in stool, constipation, nausea and vomiting.   Genitourinary:  Positive for dysuria and frequency. Negative for hematuria.   Musculoskeletal:  Positive for arthralgias and myalgias.   Allergic/Immunologic: Positive for environmental allergies.   Neurological:  Positive for headaches.   Psychiatric/Behavioral:  Negative for suicidal ideas.        Objective     /80 (BP Location: Right arm, Patient Position: Sitting, Cuff Size: Large Adult)   Pulse 71   Temp 97.1 øF (36.2 øC) (Temporal)   Resp 16   Ht 165.1 cm (65\")   Wt 103 kg (227 lb)   SpO2 97%   BMI 37.77 kg/mý     Physical Exam  Vitals reviewed.   Constitutional:       General: She is not in acute distress.     Appearance: Normal appearance. She is well-developed. She is not diaphoretic. "   HENT:      Head: Normocephalic and atraumatic.      Right Ear: Hearing, tympanic membrane, ear canal and external ear normal.      Left Ear: Hearing, tympanic membrane, ear canal and external ear normal.      Nose: Nose normal.      Right Sinus: No maxillary sinus tenderness or frontal sinus tenderness.      Left Sinus: No maxillary sinus tenderness or frontal sinus tenderness.      Mouth/Throat:      Pharynx: Uvula midline.   Eyes:      General: Lids are normal.      Conjunctiva/sclera: Conjunctivae normal.      Pupils: Pupils are equal, round, and reactive to light.   Neck:      Trachea: Trachea normal. No tracheal tenderness or tracheal deviation.   Cardiovascular:      Rate and Rhythm: Normal rate and regular rhythm.      Heart sounds: Normal heart sounds, S1 normal and S2 normal. No murmur heard.     No friction rub. No gallop.   Pulmonary:      Effort: Pulmonary effort is normal. No respiratory distress.      Breath sounds: Normal breath sounds.   Abdominal:      General: Bowel sounds are normal. There is no distension.      Palpations: Abdomen is soft.      Tenderness: There is no abdominal tenderness. There is no right CVA tenderness, left CVA tenderness, guarding or rebound.   Lymphadenopathy:      Cervical: No cervical adenopathy.   Skin:     General: Skin is warm and dry.   Neurological:      General: No focal deficit present.      Mental Status: She is alert and oriented to person, place, and time.      Cranial Nerves: Cranial nerves 2-12 are intact.      Motor: Motor function is intact. No weakness.      Gait: Gait is intact.      Deep Tendon Reflexes: Babinski sign absent on the right side. Babinski sign absent on the left side.      Reflex Scores:       Tricep reflexes are 1+ on the right side and 1+ on the left side.       Bicep reflexes are 1+ on the right side and 1+ on the left side.       Brachioradialis reflexes are 1+ on the right side and 1+ on the left side.       Patellar reflexes are 1+  on the right side and 1+ on the left side.  Psychiatric:         Behavior: Behavior normal.         Thought Content: Thought content normal.         Judgment: Judgment normal.         Current Outpatient Medications   Medication Sig Dispense Refill    Benzonatate (TESSALON PERLES PO) Take 100 mg by mouth 4 (Four) Times a Day As Needed (cough).      CALCIUM PO Take  by mouth.      famotidine (Pepcid) 40 MG tablet Take 1 tablet by mouth Daily. Take at least 2 hours after Retevmo. 30 tablet 5    Fluticasone-Salmeterol (ADVAIR/WIXELA) 250-50 MCG/ACT DISKUS INHALE 1 PUFF BY MOUTH TWICE DAILY, RINSE MOUTH WITH WATER AFTER USE TO REDUCE AFTERTASTE AND INCIDENCE OF CANDIDIASIS, DO NOT SWALLOW      guaiFENesin (MUCINEX) 600 MG 12 hr tablet Take 1 tablet by mouth.      levalbuterol (XOPENEX HFA) 45 MCG/ACT inhaler Inhale 2 puffs Every 4 (Four) Hours As Needed for Shortness of Air. 15 g 5    levocetirizine (Xyzal Allergy 24HR) 5 MG tablet Take 0.5 tablets by mouth Every Evening. 45 tablet 3    omeprazole (priLOSEC) 20 MG capsule 1 capsule 2 (Two) Times a Day.      oxyCODONE (OXY-IR) 5 MG capsule Take 1 capsule by mouth Every 4 (Four) Hours As Needed for Moderate Pain. Pt taking every 8hrs      polyethylene glycol (MIRALAX) 17 g packet Take 17 g by mouth Daily.      nitrofurantoin, macrocrystal-monohydrate, (Macrobid) 100 MG capsule Take 1 capsule by mouth 2 (Two) Times a Day. 20 capsule 0     No current facility-administered medications for this visit.            Assessment & Plan     Problem List Items Addressed This Visit    None  Visit Diagnoses       Urinary tract infection with hematuria, site unspecified    -  Primary    Relevant Medications    nitrofurantoin, macrocrystal-monohydrate, (Macrobid) 100 MG capsule    Other Relevant Orders    Urine Culture - Urine, Urine, Clean Catch    POC Urinalysis Dipstick, Automated (Completed)    Elevated serum creatinine        Relevant Orders    Basic Metabolic Panel               ICD-10-CM ICD-9-CM   1. Urinary tract infection with hematuria, site unspecified  N39.0 599.0    R31.9 599.70   2. Elevated serum creatinine  R79.89 790.99       Plan: Urinalysis has 3+ blood, large leukocytes. Start macrobid and send for culture. Check renal function today. Neuro exam is normal. If headache worsens or you develop weakness or confusion, go to ER. She feels she does not need imaging of her knee, elbow, or shoulder. Follow up with Dr. Soto.    @Body mass index is 37.77 kg/mý.              Understands disease processes and need for medications.  Understands reasons for urgent and emergent care.  Patient (& family) verbalized agreement for treatment plan.   Emotional support and active listening provided.  Patient provided time to verbalize feelings.                  This document has been electronically signed by CHEIKH Mireles   October 12, 2023 13:17 EDT

## 2023-10-12 NOTE — PROGRESS NOTES
Venipuncture Blood Specimen Collection  Venipuncture performed in right arm by Katarina Graves MA with good hemostasis. Patient tolerated the procedure well without complications.   10/12/23   Katarina Graves MA

## 2023-10-13 LAB
ANION GAP SERPL CALCULATED.3IONS-SCNC: 10 MMOL/L (ref 5–15)
BUN SERPL-MCNC: 12 MG/DL (ref 8–23)
BUN/CREAT SERPL: 11.1 (ref 7–25)
CALCIUM SPEC-SCNC: 9.5 MG/DL (ref 8.6–10.5)
CHLORIDE SERPL-SCNC: 101 MMOL/L (ref 98–107)
CO2 SERPL-SCNC: 26 MMOL/L (ref 22–29)
CREAT SERPL-MCNC: 1.08 MG/DL (ref 0.57–1)
EGFRCR SERPLBLD CKD-EPI 2021: 56.8 ML/MIN/1.73
GLUCOSE SERPL-MCNC: 85 MG/DL (ref 65–99)
POTASSIUM SERPL-SCNC: 4.1 MMOL/L (ref 3.5–5.2)
SODIUM SERPL-SCNC: 137 MMOL/L (ref 136–145)

## 2023-10-14 LAB — BACTERIA SPEC AEROBE CULT: ABNORMAL

## 2023-10-16 ENCOUNTER — CLINICAL SUPPORT (OUTPATIENT)
Dept: FAMILY MEDICINE CLINIC | Facility: CLINIC | Age: 68
End: 2023-10-16
Payer: MEDICARE

## 2023-10-16 DIAGNOSIS — C34.92 MALIGNANT NEOPLASM OF LEFT LUNG, UNSPECIFIED PART OF LUNG: ICD-10-CM

## 2023-10-16 DIAGNOSIS — C34.32 MALIGNANT NEOPLASM OF LOWER LOBE BRONCHUS, LEFT: ICD-10-CM

## 2023-10-16 LAB
ALBUMIN SERPL-MCNC: 4.3 G/DL (ref 3.5–5.2)
ALBUMIN/GLOB SERPL: 2.2 G/DL
ALP SERPL-CCNC: 96 U/L (ref 39–117)
ALT SERPL W P-5'-P-CCNC: 22 U/L (ref 1–33)
ANION GAP SERPL CALCULATED.3IONS-SCNC: 10 MMOL/L (ref 5–15)
AST SERPL-CCNC: 39 U/L (ref 1–32)
BASOPHILS # BLD AUTO: 0.05 10*3/MM3 (ref 0–0.2)
BASOPHILS NFR BLD AUTO: 1.3 % (ref 0–1.5)
BILIRUB SERPL-MCNC: 0.3 MG/DL (ref 0–1.2)
BUN SERPL-MCNC: 12 MG/DL (ref 8–23)
BUN/CREAT SERPL: 11.1 (ref 7–25)
CALCIUM SPEC-SCNC: 10.1 MG/DL (ref 8.6–10.5)
CHLORIDE SERPL-SCNC: 100 MMOL/L (ref 98–107)
CO2 SERPL-SCNC: 30 MMOL/L (ref 22–29)
CREAT SERPL-MCNC: 1.08 MG/DL (ref 0.57–1)
DEPRECATED RDW RBC AUTO: 43.2 FL (ref 37–54)
EGFRCR SERPLBLD CKD-EPI 2021: 56.8 ML/MIN/1.73
EOSINOPHIL # BLD AUTO: 0.06 10*3/MM3 (ref 0–0.4)
EOSINOPHIL NFR BLD AUTO: 1.5 % (ref 0.3–6.2)
ERYTHROCYTE [DISTWIDTH] IN BLOOD BY AUTOMATED COUNT: 14.5 % (ref 12.3–15.4)
GLOBULIN UR ELPH-MCNC: 2 GM/DL
GLUCOSE SERPL-MCNC: 82 MG/DL (ref 65–99)
HCT VFR BLD AUTO: 45 % (ref 34–46.6)
HGB BLD-MCNC: 14.8 G/DL (ref 12–15.9)
IMM GRANULOCYTES # BLD AUTO: 0.01 10*3/MM3 (ref 0–0.05)
IMM GRANULOCYTES NFR BLD AUTO: 0.3 % (ref 0–0.5)
LYMPHOCYTES # BLD AUTO: 1.42 10*3/MM3 (ref 0.7–3.1)
LYMPHOCYTES NFR BLD AUTO: 35.9 % (ref 19.6–45.3)
MCH RBC QN AUTO: 27.3 PG (ref 26.6–33)
MCHC RBC AUTO-ENTMCNC: 32.9 G/DL (ref 31.5–35.7)
MCV RBC AUTO: 82.9 FL (ref 79–97)
MONOCYTES # BLD AUTO: 0.19 10*3/MM3 (ref 0.1–0.9)
MONOCYTES NFR BLD AUTO: 4.8 % (ref 5–12)
NEUTROPHILS NFR BLD AUTO: 2.23 10*3/MM3 (ref 1.7–7)
NEUTROPHILS NFR BLD AUTO: 56.2 % (ref 42.7–76)
NRBC BLD AUTO-RTO: 0 /100 WBC (ref 0–0.2)
PLATELET # BLD AUTO: 475 10*3/MM3 (ref 140–450)
PMV BLD AUTO: 9.9 FL (ref 6–12)
POTASSIUM SERPL-SCNC: 4.3 MMOL/L (ref 3.5–5.2)
PROT SERPL-MCNC: 6.3 G/DL (ref 6–8.5)
RBC # BLD AUTO: 5.43 10*6/MM3 (ref 3.77–5.28)
SODIUM SERPL-SCNC: 140 MMOL/L (ref 136–145)
WBC NRBC COR # BLD: 3.96 10*3/MM3 (ref 3.4–10.8)

## 2023-10-16 PROCEDURE — 80053 COMPREHEN METABOLIC PANEL: CPT | Performed by: STUDENT IN AN ORGANIZED HEALTH CARE EDUCATION/TRAINING PROGRAM

## 2023-10-16 PROCEDURE — 36415 COLL VENOUS BLD VENIPUNCTURE: CPT | Performed by: INTERNAL MEDICINE

## 2023-10-16 PROCEDURE — 85025 COMPLETE CBC W/AUTO DIFF WBC: CPT | Performed by: INTERNAL MEDICINE

## 2023-10-16 NOTE — PROGRESS NOTES
Venipuncture Blood Specimen Collection  Venipuncture performed in right arm by Katarina Graves MA with good hemostasis. Patient tolerated the procedure well without complications.   10/16/23   Katarina Graves MA

## 2023-10-17 ENCOUNTER — OFFICE VISIT (OUTPATIENT)
Dept: FAMILY MEDICINE CLINIC | Facility: CLINIC | Age: 68
End: 2023-10-17
Payer: MEDICARE

## 2023-10-17 VITALS
WEIGHT: 224.2 LBS | BODY MASS INDEX: 37.36 KG/M2 | OXYGEN SATURATION: 96 % | HEART RATE: 76 BPM | SYSTOLIC BLOOD PRESSURE: 142 MMHG | HEIGHT: 65 IN | TEMPERATURE: 97.5 F | DIASTOLIC BLOOD PRESSURE: 86 MMHG

## 2023-10-17 DIAGNOSIS — C34.92 METASTATIC PRIMARY LUNG CANCER, LEFT: Primary | ICD-10-CM

## 2023-10-17 RX ORDER — FLUTICASONE PROPIONATE AND SALMETEROL XINAFOATE 230; 21 UG/1; UG/1
3 AEROSOL, METERED RESPIRATORY (INHALATION)
COMMUNITY
Start: 2023-09-25

## 2023-10-17 RX ORDER — MULTIVIT,IRON,MINERALS/LUTEIN
1 TABLET ORAL 3 TIMES DAILY
COMMUNITY

## 2023-10-17 NOTE — PROGRESS NOTES
Patient left prior to being seen. She reports intentions to call back and r/s.   Answers submitted by the patient for this visit:  Other (Submitted on 10/10/2023)  Please describe your symptoms.: Lung cancer  Have you had these symptoms before?: Yes  How long have you been having these symptoms?: Greater than 2 weeks  Primary Reason for Visit (Submitted on 10/10/2023)  What is the primary reason for your visit?: Other

## 2023-10-18 DIAGNOSIS — K44.9 HIATAL HERNIA: ICD-10-CM

## 2023-10-18 DIAGNOSIS — C34.92 METASTATIC PRIMARY LUNG CANCER, LEFT: Primary | ICD-10-CM

## 2023-10-18 DIAGNOSIS — K21.9 GASTROESOPHAGEAL REFLUX DISEASE WITHOUT ESOPHAGITIS: Chronic | ICD-10-CM

## 2023-10-18 DIAGNOSIS — R05.3 CHRONIC COUGH: Chronic | ICD-10-CM

## 2023-10-18 RX ORDER — BENZONATATE 100 MG/1
100 CAPSULE ORAL 4 TIMES DAILY PRN
Qty: 120 CAPSULE | Refills: 2 | Status: SHIPPED | OUTPATIENT
Start: 2023-10-18

## 2023-10-18 RX ORDER — FAMOTIDINE 40 MG/1
40 TABLET, FILM COATED ORAL DAILY
Qty: 30 TABLET | Refills: 5 | Status: SHIPPED | OUTPATIENT
Start: 2023-10-18

## 2023-11-08 ENCOUNTER — CLINICAL SUPPORT (OUTPATIENT)
Dept: FAMILY MEDICINE CLINIC | Facility: CLINIC | Age: 68
End: 2023-11-08
Payer: MEDICARE

## 2023-11-08 DIAGNOSIS — C79.49 SECONDARY MALIGNANT NEOPLASM OF BRAIN AND SPINAL CORD: ICD-10-CM

## 2023-11-08 DIAGNOSIS — C79.31 SECONDARY MALIGNANT NEOPLASM OF BRAIN AND SPINAL CORD: ICD-10-CM

## 2023-11-08 DIAGNOSIS — C34.90 MALIGNANT NEOPLASM OF BRONCHUS AND LUNG: ICD-10-CM

## 2023-11-08 DIAGNOSIS — N39.0 URINARY TRACT INFECTION WITHOUT HEMATURIA, SITE UNSPECIFIED: Primary | ICD-10-CM

## 2023-11-08 PROCEDURE — 87086 URINE CULTURE/COLONY COUNT: CPT | Performed by: INTERNAL MEDICINE

## 2023-11-08 PROCEDURE — 81001 URINALYSIS AUTO W/SCOPE: CPT | Performed by: INTERNAL MEDICINE

## 2023-11-09 LAB
BACTERIA SPEC AEROBE CULT: NORMAL
BACTERIA UR QL AUTO: ABNORMAL /HPF
BILIRUB UR QL STRIP: NEGATIVE
CLARITY UR: CLEAR
COLOR UR: YELLOW
GLUCOSE UR STRIP-MCNC: NEGATIVE MG/DL
HGB UR QL STRIP.AUTO: NEGATIVE
HYALINE CASTS UR QL AUTO: ABNORMAL /LPF
KETONES UR QL STRIP: NEGATIVE
LEUKOCYTE ESTERASE UR QL STRIP.AUTO: NEGATIVE
MUCOUS THREADS URNS QL MICRO: ABNORMAL /HPF
NITRITE UR QL STRIP: NEGATIVE
PH UR STRIP.AUTO: 6.5 [PH] (ref 5–8)
PROT UR QL STRIP: NEGATIVE
RBC # UR STRIP: ABNORMAL /HPF
REF LAB TEST METHOD: ABNORMAL
SP GR UR STRIP: 1.01 (ref 1–1.03)
SQUAMOUS #/AREA URNS HPF: ABNORMAL /HPF
UROBILINOGEN UR QL STRIP: NORMAL
WBC # UR STRIP: ABNORMAL /HPF

## 2023-11-10 ENCOUNTER — CLINICAL SUPPORT (OUTPATIENT)
Dept: FAMILY MEDICINE CLINIC | Facility: CLINIC | Age: 68
End: 2023-11-10
Payer: MEDICARE

## 2023-11-10 DIAGNOSIS — C34.92 METASTATIC PRIMARY LUNG CANCER, LEFT: Primary | ICD-10-CM

## 2023-11-10 PROCEDURE — 80053 COMPREHEN METABOLIC PANEL: CPT | Performed by: INTERNAL MEDICINE

## 2023-11-10 PROCEDURE — 85025 COMPLETE CBC W/AUTO DIFF WBC: CPT | Performed by: INTERNAL MEDICINE

## 2023-11-10 PROCEDURE — 36415 COLL VENOUS BLD VENIPUNCTURE: CPT | Performed by: NURSE PRACTITIONER

## 2023-11-10 NOTE — PROGRESS NOTES
Venipuncture Blood Specimen Collection  Venipuncture performed in right arm by Katarina Graves MA with good hemostasis. Patient tolerated the procedure well without complications.   11/10/23   Katarina Graves MA

## 2023-11-11 LAB
ALBUMIN SERPL-MCNC: 4.1 G/DL (ref 3.5–5.2)
ALBUMIN/GLOB SERPL: 1.6 G/DL
ALP SERPL-CCNC: 106 U/L (ref 39–117)
ALT SERPL W P-5'-P-CCNC: 21 U/L (ref 1–33)
ANION GAP SERPL CALCULATED.3IONS-SCNC: 9.6 MMOL/L (ref 5–15)
AST SERPL-CCNC: 25 U/L (ref 1–32)
BASOPHILS # BLD AUTO: 0.03 10*3/MM3 (ref 0–0.2)
BASOPHILS NFR BLD AUTO: 0.5 % (ref 0–1.5)
BILIRUB SERPL-MCNC: 0.4 MG/DL (ref 0–1.2)
BUN SERPL-MCNC: 5 MG/DL (ref 8–23)
BUN/CREAT SERPL: 7.6 (ref 7–25)
CALCIUM SPEC-SCNC: 9.4 MG/DL (ref 8.6–10.5)
CHLORIDE SERPL-SCNC: 100 MMOL/L (ref 98–107)
CO2 SERPL-SCNC: 29.4 MMOL/L (ref 22–29)
CREAT SERPL-MCNC: 0.66 MG/DL (ref 0.57–1)
DEPRECATED RDW RBC AUTO: 47.9 FL (ref 37–54)
EGFRCR SERPLBLD CKD-EPI 2021: 96.3 ML/MIN/1.73
EOSINOPHIL # BLD AUTO: 0.07 10*3/MM3 (ref 0–0.4)
EOSINOPHIL NFR BLD AUTO: 1.2 % (ref 0.3–6.2)
ERYTHROCYTE [DISTWIDTH] IN BLOOD BY AUTOMATED COUNT: 15.7 % (ref 12.3–15.4)
GLOBULIN UR ELPH-MCNC: 2.6 GM/DL
GLUCOSE SERPL-MCNC: 93 MG/DL (ref 65–99)
HCT VFR BLD AUTO: 40 % (ref 34–46.6)
HGB BLD-MCNC: 13.1 G/DL (ref 12–15.9)
IMM GRANULOCYTES # BLD AUTO: 0.03 10*3/MM3 (ref 0–0.05)
IMM GRANULOCYTES NFR BLD AUTO: 0.5 % (ref 0–0.5)
LYMPHOCYTES # BLD AUTO: 0.71 10*3/MM3 (ref 0.7–3.1)
LYMPHOCYTES NFR BLD AUTO: 12.2 % (ref 19.6–45.3)
MCH RBC QN AUTO: 27.6 PG (ref 26.6–33)
MCHC RBC AUTO-ENTMCNC: 32.8 G/DL (ref 31.5–35.7)
MCV RBC AUTO: 84.4 FL (ref 79–97)
MONOCYTES # BLD AUTO: 0.64 10*3/MM3 (ref 0.1–0.9)
MONOCYTES NFR BLD AUTO: 11 % (ref 5–12)
NEUTROPHILS NFR BLD AUTO: 4.34 10*3/MM3 (ref 1.7–7)
NEUTROPHILS NFR BLD AUTO: 74.6 % (ref 42.7–76)
NRBC BLD AUTO-RTO: 0 /100 WBC (ref 0–0.2)
PLATELET # BLD AUTO: 323 10*3/MM3 (ref 140–450)
PMV BLD AUTO: 9.3 FL (ref 6–12)
POTASSIUM SERPL-SCNC: 4.5 MMOL/L (ref 3.5–5.2)
PROT SERPL-MCNC: 6.7 G/DL (ref 6–8.5)
RBC # BLD AUTO: 4.74 10*6/MM3 (ref 3.77–5.28)
SODIUM SERPL-SCNC: 139 MMOL/L (ref 136–145)
WBC NRBC COR # BLD: 5.82 10*3/MM3 (ref 3.4–10.8)

## 2023-12-13 ENCOUNTER — OFFICE VISIT (OUTPATIENT)
Dept: FAMILY MEDICINE CLINIC | Facility: CLINIC | Age: 68
End: 2023-12-13
Payer: MEDICARE

## 2023-12-13 VITALS
SYSTOLIC BLOOD PRESSURE: 96 MMHG | HEART RATE: 125 BPM | TEMPERATURE: 97.5 F | OXYGEN SATURATION: 88 % | DIASTOLIC BLOOD PRESSURE: 68 MMHG

## 2023-12-13 DIAGNOSIS — C79.31 LUNG CANCER METASTATIC TO BRAIN: Chronic | ICD-10-CM

## 2023-12-13 DIAGNOSIS — C34.92 METASTATIC PRIMARY LUNG CANCER, LEFT: Primary | ICD-10-CM

## 2023-12-13 DIAGNOSIS — C79.89 NON-SMALL CELL CARCINOMA OF LEFT LUNG METASTATIC TO ABDOMEN: ICD-10-CM

## 2023-12-13 DIAGNOSIS — J96.11 CHRONIC RESPIRATORY FAILURE WITH HYPOXIA: ICD-10-CM

## 2023-12-13 DIAGNOSIS — J45.50 SEVERE PERSISTENT ASTHMA, UNSPECIFIED WHETHER COMPLICATED: ICD-10-CM

## 2023-12-13 DIAGNOSIS — C34.92 NON-SMALL CELL CARCINOMA OF LEFT LUNG METASTATIC TO ABDOMEN: ICD-10-CM

## 2023-12-13 DIAGNOSIS — C34.90 LUNG CANCER METASTATIC TO BRAIN: Chronic | ICD-10-CM

## 2023-12-13 PROCEDURE — 99214 OFFICE O/P EST MOD 30 MIN: CPT | Performed by: INTERNAL MEDICINE

## 2023-12-13 PROCEDURE — 1159F MED LIST DOCD IN RCRD: CPT | Performed by: INTERNAL MEDICINE

## 2023-12-13 PROCEDURE — 1160F RVW MEDS BY RX/DR IN RCRD: CPT | Performed by: INTERNAL MEDICINE

## 2023-12-13 RX ORDER — FLUTICASONE PROPIONATE AND SALMETEROL XINAFOATE 230; 21 UG/1; UG/1
2 AEROSOL, METERED RESPIRATORY (INHALATION)
COMMUNITY
Start: 2023-09-25 | End: 2023-12-13

## 2023-12-13 RX ORDER — PROMETHAZINE HYDROCHLORIDE 25 MG/1
25 TABLET ORAL
COMMUNITY
Start: 2023-11-27 | End: 2024-03-26

## 2023-12-13 RX ORDER — ONDANSETRON 4 MG/1
4 TABLET, FILM COATED ORAL EVERY 8 HOURS PRN
COMMUNITY

## 2023-12-13 RX ORDER — VITAMIN E 268 MG
400 CAPSULE ORAL DAILY
COMMUNITY
Start: 2023-11-27 | End: 2024-01-26

## 2023-12-13 RX ORDER — OXYCODONE AND ACETAMINOPHEN 10; 325 MG/1; MG/1
1 TABLET ORAL EVERY 8 HOURS PRN
COMMUNITY
Start: 2023-11-17 | End: 2023-12-13

## 2023-12-13 RX ORDER — LEVOFLOXACIN 750 MG/1
750 TABLET, FILM COATED ORAL DAILY
COMMUNITY
Start: 2023-12-12 | End: 2023-12-17

## 2023-12-13 RX ORDER — OXYCODONE HYDROCHLORIDE 10 MG/1
10 TABLET ORAL EVERY 6 HOURS PRN
COMMUNITY
Start: 2023-12-06 | End: 2024-01-05

## 2023-12-13 RX ORDER — POTASSIUM CHLORIDE 20 MEQ/1
TABLET, EXTENDED RELEASE ORAL
COMMUNITY
Start: 2023-11-09 | End: 2023-12-13

## 2023-12-13 RX ORDER — PROCHLORPERAZINE MALEATE 10 MG
10 TABLET ORAL EVERY 6 HOURS PRN
COMMUNITY
End: 2023-12-13

## 2023-12-13 NOTE — PROGRESS NOTES
Patient Name: Savi Villalpando Today's Date: 2023   Patient MRN / CSN: [id number] / [id number] Date of Encounter: 2023   Patient Age / : 68 y.o. / 1955 Encounter Provider: Camila Soto DO   Referring Physician: No ref. provider found          Savi is a 68 y.o. female who is being seen today for Shortness of Breath, Fatigue, Dizziness (Since being on morphine), and parking permit      History of Present Illness    Savi presents today for an acute visit with complaints of shortness of air, fatigue and dizziness. Since last visit with me, she started a clinical trial at the [facility] for the treatment of her metastatic NSCLC, with mets to the brain (treated with gamma knife therapy without recurrence per last MRI done 10/30/23), and mets to the abdomen with at least 2 liver lesions and a lesion on the left adrenal.  Her last infusion was yesterday at .  She reports recently feeling very short of air and fatigued with exertion.  She notes dizziness and feeling tingly with exertion as well.  She was recently started on morphine for pain control but stopped it because she felt that the dizziness was caused by it.  However, the dizziness has persisted despite morphine being stopped last week.  She has noted that shortness of air has worsened since she stopped morphine.  She reports requiring oxygen the last 2 times that she was at the [facility] because her O2 saturations dropped into the 70s.  She was told that oxygen therapy was ordered by them but has not received it yet.  She is interested in a portable oxygen concentrator to help with exertion.  She plans to go back for infusion therapy on  and has biopsy scheduled with interventional radiology on .    Allergies include:Fluad quadrivalent [influenza vac a&b sa adj quad], Metronidazole, Multi complete-iron [anti-oxidant], Other, Sertraline, Shellfish-derived products, Sulfa antibiotics,  Macrobid [nitrofurantoin], Morphine, and Sulfate  Current Outpatient Medications   Medication Sig Dispense Refill    benzonatate (Tessalon Perles) 100 MG capsule Take 1 capsule by mouth 4 (Four) Times a Day As Needed (cough). 120 capsule 2    CALCIUM PO Take  by mouth.      famotidine (Pepcid) 40 MG tablet Take 1 tablet by mouth Daily. 30 tablet 5    fluticasone-salmeterol (Advair HFA) 230-21 MCG/ACT inhaler Inhale 3 puffs.      levalbuterol (XOPENEX HFA) 45 MCG/ACT inhaler Inhale 2 puffs Every 4 (Four) Hours As Needed for Shortness of Air. 15 g 5    levocetirizine (Xyzal Allergy 24HR) 5 MG tablet Take 0.5 tablets by mouth Every Evening. 45 tablet 3    levoFLOXacin (LEVAQUIN) 750 MG tablet Take 1 tablet by mouth Daily.      omeprazole (priLOSEC) 20 MG capsule 1 capsule 2 (Two) Times a Day.      ondansetron (ZOFRAN) 4 MG tablet Take 1 tablet by mouth Every 8 (Eight) Hours As Needed for Nausea or Vomiting.      oxyCODONE (ROXICODONE) 10 MG tablet Take 1 tablet by mouth Every 6 (Six) Hours As Needed.      polyethylene glycol (MIRALAX) 17 g packet Take 17 g by mouth Daily.      promethazine (PHENERGAN) 25 MG tablet 1 tablet.      VITAMIN E 400 UNIT capsule Take 1 capsule by mouth Daily.       No current facility-administered medications for this visit.     Past Medical History:   Diagnosis Date    Allergic 1955    Allergies     Anxiety 1970    Arthritis 2010    Asthma 1956    Cholelithiasis 1976    Depression 1979    Post partum    Diverticulitis     Diverticulitis of colon Nov. 2022    Diverticulosis 2022    Elevated liver enzymes     GERD (gastroesophageal reflux disease) 2023    HL (hearing loss) 2011    Lung cancer     stage 4    Obesity 1997    PONV (postoperative nausea and vomiting) 1990?    Ever since unless given anti-nausea before.    Rectal bleeding Nov. 2022    Rib fracture     Urinary tract infection 2023     Family History   Problem Relation Age of Onset    Arthritis Mother     Mental illness Mother      Heart disease Father     Alcohol abuse Sister     Drug abuse Sister     Miscarriages / Stillbirths Daughter         Miscarriage    Asthma Maternal Grandmother     Hearing loss Maternal Grandmother     Cancer Maternal Uncle     Breast cancer Neg Hx      Past Surgical History:   Procedure Laterality Date    BILATERAL BREAST REDUCTION      3 lb removal    BRAIN SURGERY N/A     Gamma knife surgery to remove brain mets    BREAST BIOPSY  1990    It was a fibrous mass about golf ball sized    BREAST CYST ASPIRATION  1990    Noted above    BREAST EXCISIONAL BIOPSY Right 1989    benign    BREAST SURGERY  2021    CERVICAL FUSION      C567 partial 8    CHOLECYSTECTOMY      COLONOSCOPY  2023 July    COSMETIC SURGERY  2021    EYE SURGERY  2000    HAND SURGERY      x3    HYSTERECTOMY Bilateral 1999    total-benign    LYMPH NODE DISSECTION      back of neck    REDUCTION MAMMAPLASTY Bilateral 2019    TRIGGER FINGER RELEASE       Social History     Substance and Sexual Activity   Alcohol Use Not Currently    Alcohol/week: 3.0 standard drinks of alcohol    Types: 3 Glasses of wine per week    Comment: This is for pain management.     Social History     Tobacco Use   Smoking Status Never   Smokeless Tobacco Never   Tobacco Comments    Both parents smoked     Social History     Substance and Sexual Activity   Drug Use Never     Review of Systems   Constitutional:  Positive for fatigue. Negative for fever.   Respiratory:  Positive for shortness of breath.         Shortness of air, worse with exertion and talking, despite the current inhaler regimen.  Of note, patient has a history of severe persistent asthma.   Genitourinary:         Patient was started on Levaquin yesterday for UTI.   Neurological:  Positive for dizziness and numbness.        Depression Assessment Review:  PHQ-9 Total Score:    Vital Signs & Measurements Taken This Encounter  BP 96/68 (BP Location: Left arm, Patient Position: Sitting, Cuff Size: Adult)   Pulse (!)  125   Temp 97.5 °F (36.4 °C) (Temporal)   SpO2 (!) 88% Comment: RA with exertion   SpO2 Percentage    12/13/23 1532 12/13/23 1547 12/13/23 1632   SpO2: 92% 98%  Comment: after resting, no talking (!) 88%  Comment: RA with exertion      O2 saturation dropped to 88% and heart rate increased to 125 bpm with exertion.         Physical Exam  Vitals reviewed.   Constitutional:       General: She is not in acute distress.  HENT:      Head: Normocephalic and atraumatic.   Eyes:      General: No scleral icterus.     Extraocular Movements: Extraocular movements intact.      Conjunctiva/sclera: Conjunctivae normal.      Pupils: Pupils are equal, round, and reactive to light.   Cardiovascular:      Rate and Rhythm: Normal rate and regular rhythm.   Pulmonary:      Effort: No respiratory distress.      Breath sounds: No wheezing or rhonchi.      Comments: Dyspneic with exertion and talking  Musculoskeletal:         General: No swelling.      Cervical back: Neck supple. No tenderness.   Lymphadenopathy:      Cervical: No cervical adenopathy.   Skin:     General: Skin is warm and dry.      Coloration: Skin is not jaundiced.   Neurological:      Mental Status: She is alert.   Psychiatric:         Mood and Affect: Mood normal.         Behavior: Behavior normal.         Thought Content: Thought content normal.         Judgment: Judgment normal.              Assessment & Plan  Patient Active Problem List   Diagnosis    Lung cancer metastatic to brain    Diverticulosis    Nausea    Gastroesophageal reflux disease without esophagitis    Seasonal allergic rhinitis    Chronic cough    Hiatal hernia    Metastatic primary lung cancer, left    Non-small cell carcinoma of left lung metastatic to abdomen    Severe persistent asthma    Chronic respiratory failure with hypoxia       ICD-10-CM ICD-9-CM   1. Metastatic primary lung cancer, left  C34.92 162.9   2. Lung cancer metastatic to brain  C34.90 162.9    C79.31 198.3   3. Non-small cell  carcinoma of left lung metastatic to abdomen  C34.92 162.9    C79.89 198.89   4. Chronic respiratory failure with hypoxia  J96.11 518.83     799.02   5. Severe persistent asthma, unspecified whether complicated  J45.50 493.90     Orders Placed This Encounter   Procedures    Miscellaneous DME     Order Specific Question:   Type of DME     Answer:   Portable Oxygen Concentrator with 2 L NC. Please send all necessary tubing and supplies.     Order Specific Question:   Length of Need     Answer:   99 Months = Lifetime    Miscellaneous DME     Order Specific Question:   Type of DME     Answer:   Nocturnal pulse oximetry on room air     Order Specific Question:   Length of Need     Answer:   99 Months = Lifetime       Meds Ordered During Visit:  No orders of the defined types were placed in this encounter.    I recommended portable oxygen 2 L nasal cannula with exertion.  I also recommended a nocturnal pulse ox.  We will continue current inhaler regimen at this time.  I reviewed oncology notes and encourage patient to follow-up with her specialists as planned.  I filled out disabled parking tag for patient today as well.    Return in about 1 week (around 12/20/2023), or if symptoms worsen or fail to improve.          Referring Provider (if known): No ref. provider found      This document has been electronically signed by Camila Soto DO  December 13, 2023 16:51 EST    Camila Soto DO, FACOI  [address] [place] [address]  [phone] (office)    Part of this note may be an electronic transcription/translation of spoken language to printed text using the Dragon Dictation System.

## 2023-12-21 ENCOUNTER — OFFICE VISIT (OUTPATIENT)
Dept: FAMILY MEDICINE CLINIC | Facility: CLINIC | Age: 68
End: 2023-12-21
Payer: MEDICARE

## 2023-12-21 VITALS — TEMPERATURE: 97.5 F | HEART RATE: 91 BPM | WEIGHT: 221 LBS | OXYGEN SATURATION: 98 % | BODY MASS INDEX: 36.78 KG/M2

## 2023-12-21 DIAGNOSIS — C34.92 METASTATIC PRIMARY LUNG CANCER, LEFT: Primary | ICD-10-CM

## 2023-12-21 DIAGNOSIS — R39.11 URINARY HESITANCY: ICD-10-CM

## 2023-12-21 DIAGNOSIS — J96.11 CHRONIC RESPIRATORY FAILURE WITH HYPOXIA: ICD-10-CM

## 2023-12-21 PROCEDURE — 1159F MED LIST DOCD IN RCRD: CPT | Performed by: INTERNAL MEDICINE

## 2023-12-21 PROCEDURE — 1160F RVW MEDS BY RX/DR IN RCRD: CPT | Performed by: INTERNAL MEDICINE

## 2023-12-21 PROCEDURE — 99214 OFFICE O/P EST MOD 30 MIN: CPT | Performed by: INTERNAL MEDICINE

## 2023-12-21 NOTE — PROGRESS NOTES
Patient Name: Savi Villalpando Today's Date: 2023   Patient MRN / CSN: [id number] / [id number] Date of Encounter: 2023   Patient Age / : 68 y.o. / 1955 Encounter Provider: Camila Soto DO   Referring Physician: No ref. provider found          Savi is a 68 y.o. female who is being seen today for Follow-up, Lung Cancer, bladder fullness, and Urinary Retention      History of Present Illness    Savi presents today for a 1 week follow-up on metastatic lung cancer.  At her last appointment, she was very short of air, having spells of dizziness, fatigue, and presyncope.  She was becoming hypoxic with exertion.  Since last visit, she got her portable oxygen concentrator and is wearing it at 2 L nasal cannula.  She reports doing much better with this therapy.  She does note a couple of times over the past week where she is gone without the oxygen and has had presyncopal/syncopal episodes.  She reports feeling much better more energetic as well as less dyspneic with O2.  She also got the disabled parking tag as prescribed last visit.  She is planning to follow-up with oncology and interventional radiology next week at .  She has noted some bladder fullness and urinary hesitancy over the past week.  She denies dysuria.  She recently finished Levaquin for UTI.  She denies hematuria or fever.  She reports the hesitancy was couple of days ago and very uncomfortable but has slowly gotten better.  She reports feeling that she emptied her bladder fully just prior to her visit today.  She has tried some new foods over the past week and has been taking oxycodone as prescribed for pain control.    Allergies include:Fluad quadrivalent [influenza vac a&b sa adj quad], Metronidazole, Multi complete-iron [anti-oxidant], Other, Sertraline, Shellfish-derived products, Sulfa antibiotics, Macrobid [nitrofurantoin], Morphine, and Sulfate  Current Outpatient Medications   Medication Sig Dispense Refill     benzonatate (Tessalon Perles) 100 MG capsule Take 1 capsule by mouth 4 (Four) Times a Day As Needed (cough). 120 capsule 2    CALCIUM PO Take  by mouth.      famotidine (Pepcid) 40 MG tablet Take 1 tablet by mouth Daily. 30 tablet 5    fluticasone-salmeterol (Advair HFA) 230-21 MCG/ACT inhaler Inhale 3 puffs.      levalbuterol (XOPENEX HFA) 45 MCG/ACT inhaler Inhale 2 puffs Every 4 (Four) Hours As Needed for Shortness of Air. 15 g 5    levocetirizine (Xyzal Allergy 24HR) 5 MG tablet Take 0.5 tablets by mouth Every Evening. 45 tablet 3    omeprazole (priLOSEC) 20 MG capsule 1 capsule 2 (Two) Times a Day.      ondansetron (ZOFRAN) 4 MG tablet Take 1 tablet by mouth Every 8 (Eight) Hours As Needed for Nausea or Vomiting.      oxyCODONE (ROXICODONE) 10 MG tablet Take 1 tablet by mouth Every 6 (Six) Hours As Needed.      polyethylene glycol (MIRALAX) 17 g packet Take 17 g by mouth Daily.      promethazine (PHENERGAN) 25 MG tablet 1 tablet.      VITAMIN E 400 UNIT capsule Take 1 capsule by mouth Daily.       No current facility-administered medications for this visit.     Past Medical History:   Diagnosis Date    Allergic 1955    Allergies     Anxiety 1970    Arthritis 2010    Asthma 1956    Cholelithiasis 1976    Depression 1979    Post partum    Diverticulitis     Diverticulitis of colon Nov. 2022    Diverticulosis 2022    Elevated liver enzymes     GERD (gastroesophageal reflux disease) 2023    HL (hearing loss) 2011    Lung cancer     stage 4    Obesity 1997    PONV (postoperative nausea and vomiting) 1990?    Ever since unless given anti-nausea before.    Rectal bleeding Nov. 2022    Rib fracture     Urinary tract infection 2023     Family History   Problem Relation Age of Onset    Arthritis Mother     Mental illness Mother     Heart disease Father     Alcohol abuse Sister     Drug abuse Sister     Miscarriages / Stillbirths Daughter         Miscarriage    Asthma Maternal Grandmother     Hearing loss Maternal  Grandmother     Cancer Maternal Uncle     Breast cancer Neg Hx      Past Surgical History:   Procedure Laterality Date    BILATERAL BREAST REDUCTION      3 lb removal    BRAIN SURGERY N/A     Gamma knife surgery to remove brain mets    BREAST BIOPSY  1990    It was a fibrous mass about golf ball sized    BREAST CYST ASPIRATION  1990    Noted above    BREAST EXCISIONAL BIOPSY Right 1989    benign    BREAST SURGERY  2021    CERVICAL FUSION      C567 partial 8    CHOLECYSTECTOMY      COLONOSCOPY  2023 July    COSMETIC SURGERY  2021    EYE SURGERY  2000    HAND SURGERY      x3    HYSTERECTOMY Bilateral 1999    total-benign    LYMPH NODE DISSECTION      back of neck    REDUCTION MAMMAPLASTY Bilateral 2019    TRIGGER FINGER RELEASE       Social History     Substance and Sexual Activity   Alcohol Use Not Currently    Alcohol/week: 3.0 standard drinks of alcohol    Types: 3 Glasses of wine per week    Comment: This is for pain management.     Social History     Tobacco Use   Smoking Status Never   Smokeless Tobacco Never   Tobacco Comments    Both parents smoked     Social History     Substance and Sexual Activity   Drug Use Never     Review of Systems   Constitutional:  Positive for fatigue. Negative for fever.   Respiratory:  Positive for shortness of breath.         Improved with O2 therapy   Genitourinary:  Negative for dysuria and hematuria.        Depression Assessment Review:  PHQ-9 Total Score:    Vital Signs & Measurements Taken This Encounter  Pulse 91   Temp 97.5 °F (36.4 °C) (Temporal)   Wt 100 kg (221 lb)   SpO2 98% Comment: 2LNC  BMI 36.78 kg/m²    SpO2 Percentage    12/21/23 1631   SpO2: 98%  Comment: 2LNC        Physical Exam  Vitals reviewed.   Constitutional:       General: She is not in acute distress.  HENT:      Head: Normocephalic and atraumatic.   Eyes:      General: No scleral icterus.     Extraocular Movements: Extraocular movements intact.      Conjunctiva/sclera: Conjunctivae normal.       Pupils: Pupils are equal, round, and reactive to light.   Cardiovascular:      Rate and Rhythm: Normal rate and regular rhythm.   Pulmonary:      Effort: Pulmonary effort is normal. No respiratory distress.      Breath sounds: Normal breath sounds. No wheezing or rhonchi.   Musculoskeletal:         General: No swelling.   Skin:     General: Skin is warm and dry.      Coloration: Skin is not jaundiced.   Neurological:      Mental Status: She is alert.   Psychiatric:         Mood and Affect: Mood normal.         Behavior: Behavior normal.         Thought Content: Thought content normal.         Judgment: Judgment normal.              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 Metastatic primary lung cancer, left  C34.92 162.9   2. Chronic respiratory failure with hypoxia  J96.11 518.83     799.02   3. Urinary hesitancy  R39.11 788.64     No orders of the defined types were placed in this encounter.      Meds Ordered During Visit:  No orders of the defined types were placed in this encounter.      I explained that urinary hesitancy can happen as a medication side effect, and sometimes cystitis can occur after eating certain foods.  Patient reports the urinary symptoms seem to be getting better.  We agreed to hold on urine analysis at this time.  I encouraged patient to reach out if needed for worsening symptoms.  I also encouraged her to only take oxycodone as needed and wean it as tolerated.  We will continue her other medicines as previously prescribed.  I encouraged her to go to the emergency  department if acute urinary retention occurred.  I encouraged her to wear O2 as prescribed with any exertion.  I also encouraged patient to follow-up with her specialists at  next week, as planned.  I reviewed recent from  today.    Return in about 1 month (around 1/21/2024), or if symptoms worsen or fail to improve, for Recheck.          Referring Provider (if known): No ref. provider found      This document has been electronically signed by Camila Soto DO  December 21, 2023 18:29 EST    Camila Soto DO, FACOI  [address] [place] [address]  [phone] (office)    Part of this note may be an electronic transcription/translation of spoken language to printed text using the Dragon Dictation System.

## 2024-01-23 ENCOUNTER — OFFICE VISIT (OUTPATIENT)
Dept: FAMILY MEDICINE CLINIC | Facility: CLINIC | Age: 69
End: 2024-01-23
Payer: MEDICARE

## 2024-01-23 VITALS
WEIGHT: 213 LBS | HEART RATE: 94 BPM | OXYGEN SATURATION: 98 % | BODY MASS INDEX: 35.45 KG/M2 | DIASTOLIC BLOOD PRESSURE: 82 MMHG | TEMPERATURE: 97.5 F | SYSTOLIC BLOOD PRESSURE: 100 MMHG

## 2024-01-23 DIAGNOSIS — N30.01 ACUTE CYSTITIS WITH HEMATURIA: ICD-10-CM

## 2024-01-23 DIAGNOSIS — C34.92 METASTATIC PRIMARY LUNG CANCER, LEFT: Primary | ICD-10-CM

## 2024-01-23 DIAGNOSIS — I26.99 ACUTE PULMONARY EMBOLISM, UNSPECIFIED PULMONARY EMBOLISM TYPE, UNSPECIFIED WHETHER ACUTE COR PULMONALE PRESENT: ICD-10-CM

## 2024-01-23 DIAGNOSIS — B37.0 THRUSH, ORAL: ICD-10-CM

## 2024-01-23 LAB
BILIRUB BLD-MCNC: NEGATIVE MG/DL
CLARITY, POC: ABNORMAL
COLOR UR: YELLOW
GLUCOSE UR STRIP-MCNC: ABNORMAL MG/DL
KETONES UR QL: NEGATIVE
LEUKOCYTE EST, POC: ABNORMAL
NITRITE UR-MCNC: NEGATIVE MG/ML
PH UR: 6 [PH] (ref 5–8)
PROT UR STRIP-MCNC: NEGATIVE MG/DL
RBC # UR STRIP: ABNORMAL /UL
SP GR UR: 1.01 (ref 1–1.03)
UROBILINOGEN UR QL: ABNORMAL

## 2024-01-23 PROCEDURE — 1159F MED LIST DOCD IN RCRD: CPT | Performed by: INTERNAL MEDICINE

## 2024-01-23 PROCEDURE — 81002 URINALYSIS NONAUTO W/O SCOPE: CPT | Performed by: INTERNAL MEDICINE

## 2024-01-23 PROCEDURE — 99214 OFFICE O/P EST MOD 30 MIN: CPT | Performed by: INTERNAL MEDICINE

## 2024-01-23 PROCEDURE — 87086 URINE CULTURE/COLONY COUNT: CPT | Performed by: INTERNAL MEDICINE

## 2024-01-23 PROCEDURE — 1160F RVW MEDS BY RX/DR IN RCRD: CPT | Performed by: INTERNAL MEDICINE

## 2024-01-23 RX ORDER — LEVOFLOXACIN 500 MG/1
500 TABLET, FILM COATED ORAL DAILY
Qty: 7 TABLET | Refills: 0 | Status: SHIPPED | OUTPATIENT
Start: 2024-01-23 | End: 2024-01-27 | Stop reason: HOSPADM

## 2024-01-23 RX ORDER — ONDANSETRON HYDROCHLORIDE 8 MG/1
8 TABLET, FILM COATED ORAL EVERY 8 HOURS PRN
COMMUNITY
Start: 2024-01-22

## 2024-01-23 RX ORDER — ENOXAPARIN SODIUM 100 MG/ML
100 INJECTION SUBCUTANEOUS
Status: ON HOLD | COMMUNITY
End: 2024-01-27

## 2024-01-23 RX ORDER — APIXABAN 5 MG (74)
5 KIT ORAL EVERY 12 HOURS
COMMUNITY

## 2024-01-23 RX ORDER — CALCIUM CARBONATE 750 MG/1
750 TABLET, CHEWABLE ORAL
Status: ON HOLD | COMMUNITY
End: 2024-01-27

## 2024-01-23 NOTE — PROGRESS NOTES
"  Patient Name: Savi Villalpando Today's Date: 2024   Patient MRN / CSN: [id number] / [id number] Date of Encounter: 2024   Patient Age / : 68 y.o. / 1955 Encounter Provider: Camila Soto DO   Referring Physician: No ref. provider found          Savi is a 68 y.o. female who is being seen today for Lung Cancer, Urinary Tract Infection, and Oral Pain      History of Present Illness    Savi presents today for follow-up on metastatic lung cancer, managed by oncology at , with new complaints of UTI symptoms and mouth pain.  She reports plans to leave this afternoon to go to  for preop tomorrow and bronchoscopy next week.  Since last visit with me, she reports things have been \"rough\".  She had progression of disease on her most recent scans.  She reports her left lung is slowly collapsing and they are planning for bronchoscopy with possible stent placement next week.  She was removed from the clinical trial that she was on.  She has been restarted on pralsetinib and reports having severe diarrhea with it, sometimes up to 12 bowel movements a day.  She also has mouth pain, unrelieved with the miracle mouthwash given to her by oncology.  She was found to have PE since last visit and was started on Eliquis.  However, at this time she is not on Eliquis but on Lovenox in its place due to her upcoming procedure.  She reports that shortness of air seems to be improving slightly compared to what it was a couple weeks ago.  She reports UTI symptoms that started yesterday.  She recalls taking Levaquin last month for UTI and reports that it worked very well for her.  She denies fever.    Allergies include:Fluad quadrivalent [influenza vac a&b sa adj quad], Metronidazole, Multi complete-iron [anti-oxidant], Other, Sertraline, Shellfish-derived products, Sulfa antibiotics, Macrobid [nitrofurantoin], Morphine, and Sulfate  Current Outpatient Medications   Medication Sig Dispense Refill    benzonatate " (Tessalon Perles) 100 MG capsule Take 1 capsule by mouth 4 (Four) Times a Day As Needed (cough). 120 capsule 2    calcium carbonate EX (TUMS EX) 750 MG chewable tablet Chew 1 tablet.      CALCIUM PO Take  by mouth.      Eliquis DVT/PE Starter Pack tablet therapy pack Take two 5 mg tablets by mouth every 12 hours for 7 days. Followed by one 5 mg tablet every 12 hours. (Dispense starter pack if available) Stopped for surgery      Enoxaparin Sodium (LOVENOX) 100 MG/ML solution prefilled syringe syringe Inject 1 mL under the skin into the appropriate area as directed.      famotidine (Pepcid) 40 MG tablet Take 1 tablet by mouth Daily. 30 tablet 5    fluticasone-salmeterol (Advair HFA) 230-21 MCG/ACT inhaler Inhale 3 puffs.      levalbuterol (XOPENEX HFA) 45 MCG/ACT inhaler Inhale 2 puffs Every 4 (Four) Hours As Needed for Shortness of Air. 15 g 5    levocetirizine (Xyzal Allergy 24HR) 5 MG tablet Take 0.5 tablets by mouth Every Evening. 45 tablet 3    omeprazole (priLOSEC) 20 MG capsule 1 capsule 2 (Two) Times a Day.      ondansetron (ZOFRAN) 8 MG tablet Take 1 tablet by mouth.      Pralsetinib 100 MG capsule Take 400 mg by mouth Daily.      promethazine (PHENERGAN) 25 MG tablet 1 tablet.      levoFLOXacin (Levaquin) 500 MG tablet Take 1 tablet by mouth Daily. 7 tablet 0    nystatin (MYCOSTATIN) 100,000 unit/mL suspension Swish and swallow 5 mL 4 (Four) Times a Day. Use for 10 days. 473 mL 2     No current facility-administered medications for this visit.     Past Medical History:   Diagnosis Date    Allergic 1955    Allergies     Anxiety 1970    Arthritis 2010    Asthma 1956    Cholelithiasis 1976    Depression 1979    Post partum    Diverticulitis     Diverticulitis of colon Nov. 2022    Diverticulosis 2022    Elevated liver enzymes     GERD (gastroesophageal reflux disease) 2023    HL (hearing loss) 2011    Lung cancer     stage 4    Obesity 1997    PONV (postoperative nausea and vomiting) 1990?    Ever since unless  given anti-nausea before.    Rectal bleeding Nov. 2022    Rib fracture     Urinary tract infection 2023     Family History   Problem Relation Age of Onset    Arthritis Mother     Mental illness Mother     Heart disease Father     Alcohol abuse Sister     Drug abuse Sister     Miscarriages / Stillbirths Daughter         Miscarriage    Asthma Maternal Grandmother     Hearing loss Maternal Grandmother     Cancer Maternal Uncle     Breast cancer Neg Hx      Past Surgical History:   Procedure Laterality Date    BILATERAL BREAST REDUCTION      3 lb removal    BRAIN SURGERY N/A     Gamma knife surgery to remove brain mets    BREAST BIOPSY  1990    It was a fibrous mass about golf ball sized    BREAST CYST ASPIRATION  1990    Noted above    BREAST EXCISIONAL BIOPSY Right 1989    benign    BREAST SURGERY  2021    CERVICAL FUSION      C567 partial 8    CHOLECYSTECTOMY      COLONOSCOPY  2023 July    COSMETIC SURGERY  2021    EYE SURGERY  2000    HAND SURGERY      x3    HYSTERECTOMY Bilateral 1999    total-benign    LYMPH NODE DISSECTION      back of neck    REDUCTION MAMMAPLASTY Bilateral 2019    TRIGGER FINGER RELEASE       Social History     Substance and Sexual Activity   Alcohol Use Not Currently    Alcohol/week: 3.0 standard drinks of alcohol    Types: 3 Glasses of wine per week    Comment: This is for pain management.     Social History     Tobacco Use   Smoking Status Never   Smokeless Tobacco Never   Tobacco Comments    Both parents smoked     Social History     Substance and Sexual Activity   Drug Use Never     Review of Systems   Constitutional:  Positive for fatigue. Negative for fever.   Respiratory:  Positive for cough and shortness of breath.    Gastrointestinal:  Positive for diarrhea.   Genitourinary:         Malodorous urine with urgency, started yesterday.        Depression Assessment Review:  PHQ-9 Total Score:    Vital Signs & Measurements Taken This Encounter  /82 (BP Location: Left arm, Patient  Position: Sitting, Cuff Size: Large Adult)   Pulse 94   Temp 97.5 °F (36.4 °C) (Temporal)   Wt 96.6 kg (213 lb)   SpO2 98%   BMI 35.45 kg/m²    SpO2 Percentage    01/23/24 1419   SpO2: 98%               Physical Exam  Vitals reviewed.   Constitutional:       General: She is not in acute distress.  HENT:      Head: Normocephalic and atraumatic.      Mouth/Throat:      Mouth: Mucous membranes are moist.      Pharynx: Posterior oropharyngeal erythema present.      Comments: White patches noted on the tongue  Eyes:      General: No scleral icterus.     Extraocular Movements: Extraocular movements intact.      Conjunctiva/sclera: Conjunctivae normal.      Pupils: Pupils are equal, round, and reactive to light.   Cardiovascular:      Rate and Rhythm: Normal rate.   Pulmonary:      Comments: Some mild conversational dyspnea.  Frequent, dry coughing  Skin:     Coloration: Skin is pale. Skin is not jaundiced.   Neurological:      Mental Status: She is alert.   Psychiatric:         Mood and Affect: Mood normal.         Behavior: Behavior normal.              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 Metastatic primary lung cancer, left  C34.92 162.9   2. Acute cystitis with hematuria  N30.01 595.0   3. Thrush, oral  B37.0 112.0   4. Acute pulmonary embolism, unspecified pulmonary embolism type, unspecified whether acute cor pulmonale present  I26.99 415.19     Orders Placed This Encounter   Procedures    Urine Culture - Urine, Urine, Clean Catch     Order Specific Question:    Release to patient     Answer:   Routine Release [[id number]]    POC Urinalysis Dipstick     Order Specific Question:   Release to patient     Answer:   Routine Release [[id number]]       Meds Ordered During Visit:  New Medications Ordered This Visit   Medications    nystatin (MYCOSTATIN) 100,000 unit/mL suspension     Sig: Swish and swallow 5 mL 4 (Four) Times a Day. Use for 10 days.     Dispense:  473 mL     Refill:  2    levoFLOXacin (Levaquin) 500 MG tablet     Sig: Take 1 tablet by mouth Daily.     Dispense:  7 tablet     Refill:  0     I reviewed records from [facility] today I encourage patient to follow-up with her specialist there as planned.  I recommended nystatin swish and swallow.  I will also send in Levaquin for UTI symptoms pending culture.  UA showed large leukocytes and +1 blood.  I recommend patient continue her other medicines as prescribed and continue to hydrate well.  We will plan on a 1 month follow-up, or certainly sooner if needed.    Return in about 1 month (around 2/23/2024), or if symptoms worsen or fail to improve, for Recheck.          Referring Provider (if known): No ref. provider found      This document has been electronically signed by Camila Soto DO  January 23, 2024 16:28 EST    Camila Soto DO, FACOI  [address] [place] [address] [phone] (office)    Part of this note may be an electronic transcription/translation of spoken language to printed text using the Dragon Dictation System.  Answers submitted by the patient for this visit:  Other (Submitted on 1/16/2024)  Please describe your symptoms.: Cancer  Have you had these symptoms before?: Yes  How long have you been having these symptoms?: Greater than 2 weeks  Primary Reason for Visit (Submitted on 1/16/2024)  What is the primary reason for your visit?: Other

## 2024-01-24 LAB — BACTERIA SPEC AEROBE CULT: NORMAL

## 2024-01-26 ENCOUNTER — HOSPITAL ENCOUNTER (INPATIENT)
Facility: HOSPITAL | Age: 69
LOS: 1 days | Discharge: ANOTHER HOSPITAL | DRG: 441 | End: 2024-01-27
Attending: STUDENT IN AN ORGANIZED HEALTH CARE EDUCATION/TRAINING PROGRAM | Admitting: INTERNAL MEDICINE
Payer: MEDICARE

## 2024-01-26 ENCOUNTER — APPOINTMENT (OUTPATIENT)
Dept: CT IMAGING | Facility: HOSPITAL | Age: 69
DRG: 441 | End: 2024-01-26
Payer: MEDICARE

## 2024-01-26 ENCOUNTER — APPOINTMENT (OUTPATIENT)
Dept: GENERAL RADIOLOGY | Facility: HOSPITAL | Age: 69
DRG: 441 | End: 2024-01-26
Payer: MEDICARE

## 2024-01-26 DIAGNOSIS — R42 DIZZINESS: ICD-10-CM

## 2024-01-26 DIAGNOSIS — R11.0 NAUSEA: ICD-10-CM

## 2024-01-26 DIAGNOSIS — R53.1 GENERALIZED WEAKNESS: ICD-10-CM

## 2024-01-26 DIAGNOSIS — C34.92 NON-SMALL CELL CARCINOMA OF LEFT LUNG METASTATIC TO ABDOMEN: ICD-10-CM

## 2024-01-26 DIAGNOSIS — R79.89 ELEVATED LFTS: ICD-10-CM

## 2024-01-26 DIAGNOSIS — N17.9 ACUTE KIDNEY INJURY: Primary | ICD-10-CM

## 2024-01-26 DIAGNOSIS — R79.89 ELEVATED TROPONIN: ICD-10-CM

## 2024-01-26 DIAGNOSIS — C79.89 NON-SMALL CELL CARCINOMA OF LEFT LUNG METASTATIC TO ABDOMEN: ICD-10-CM

## 2024-01-26 DIAGNOSIS — Z85.118 HISTORY OF LUNG CANCER: ICD-10-CM

## 2024-01-26 LAB
ALBUMIN SERPL-MCNC: 3.6 G/DL (ref 3.5–5.2)
ALBUMIN/GLOB SERPL: 2 G/DL
ALP SERPL-CCNC: 93 U/L (ref 39–117)
ALT SERPL W P-5'-P-CCNC: 3123 U/L (ref 1–33)
ANION GAP SERPL CALCULATED.3IONS-SCNC: 15.3 MMOL/L (ref 5–15)
AST SERPL-CCNC: 3318 U/L (ref 1–32)
BASOPHILS # BLD AUTO: 0.01 10*3/MM3 (ref 0–0.2)
BASOPHILS NFR BLD AUTO: 0.2 % (ref 0–1.5)
BILIRUB SERPL-MCNC: 1 MG/DL (ref 0–1.2)
BUN SERPL-MCNC: 27 MG/DL (ref 8–23)
BUN/CREAT SERPL: 12.9 (ref 7–25)
CALCIUM SPEC-SCNC: 8.7 MG/DL (ref 8.6–10.5)
CHLORIDE SERPL-SCNC: 97 MMOL/L (ref 98–107)
CO2 SERPL-SCNC: 22.7 MMOL/L (ref 22–29)
CREAT SERPL-MCNC: 2.1 MG/DL (ref 0.57–1)
DEPRECATED RDW RBC AUTO: 57.4 FL (ref 37–54)
EGFRCR SERPLBLD CKD-EPI 2021: 25.4 ML/MIN/1.73
EOSINOPHIL # BLD AUTO: 0 10*3/MM3 (ref 0–0.4)
EOSINOPHIL NFR BLD AUTO: 0 % (ref 0.3–6.2)
ERYTHROCYTE [DISTWIDTH] IN BLOOD BY AUTOMATED COUNT: 16.9 % (ref 12.3–15.4)
GEN 5 2HR TROPONIN T REFLEX: 114 NG/L
GLOBULIN UR ELPH-MCNC: 1.8 GM/DL
GLUCOSE SERPL-MCNC: 107 MG/DL (ref 65–99)
HCT VFR BLD AUTO: 38.1 % (ref 34–46.6)
HGB BLD-MCNC: 12.2 G/DL (ref 12–15.9)
IMM GRANULOCYTES # BLD AUTO: 0.02 10*3/MM3 (ref 0–0.05)
IMM GRANULOCYTES NFR BLD AUTO: 0.5 % (ref 0–0.5)
LIPASE SERPL-CCNC: 341 U/L (ref 13–60)
LYMPHOCYTES # BLD AUTO: 2.16 10*3/MM3 (ref 0.7–3.1)
LYMPHOCYTES NFR BLD AUTO: 49.4 % (ref 19.6–45.3)
MCH RBC QN AUTO: 29.5 PG (ref 26.6–33)
MCHC RBC AUTO-ENTMCNC: 32 G/DL (ref 31.5–35.7)
MCV RBC AUTO: 92 FL (ref 79–97)
MONOCYTES # BLD AUTO: 0.15 10*3/MM3 (ref 0.1–0.9)
MONOCYTES NFR BLD AUTO: 3.4 % (ref 5–12)
NEUTROPHILS NFR BLD AUTO: 2.03 10*3/MM3 (ref 1.7–7)
NEUTROPHILS NFR BLD AUTO: 46.5 % (ref 42.7–76)
PLATELET # BLD AUTO: 234 10*3/MM3 (ref 140–450)
PMV BLD AUTO: 9.6 FL (ref 6–12)
POTASSIUM SERPL-SCNC: 4.4 MMOL/L (ref 3.5–5.2)
PROT SERPL-MCNC: 5.4 G/DL (ref 6–8.5)
RBC # BLD AUTO: 4.14 10*6/MM3 (ref 3.77–5.28)
SODIUM SERPL-SCNC: 135 MMOL/L (ref 136–145)
TROPONIN T DELTA: -20 NG/L
TROPONIN T SERPL HS-MCNC: 134 NG/L
WBC NRBC COR # BLD AUTO: 4.37 10*3/MM3 (ref 3.4–10.8)

## 2024-01-26 PROCEDURE — 74176 CT ABD & PELVIS W/O CONTRAST: CPT

## 2024-01-26 PROCEDURE — 80074 ACUTE HEPATITIS PANEL: CPT | Performed by: STUDENT IN AN ORGANIZED HEALTH CARE EDUCATION/TRAINING PROGRAM

## 2024-01-26 PROCEDURE — 99285 EMERGENCY DEPT VISIT HI MDM: CPT

## 2024-01-26 PROCEDURE — 80053 COMPREHEN METABOLIC PANEL: CPT | Performed by: STUDENT IN AN ORGANIZED HEALTH CARE EDUCATION/TRAINING PROGRAM

## 2024-01-26 PROCEDURE — 25010000002 HYDROMORPHONE 1 MG/ML SOLUTION: Performed by: STUDENT IN AN ORGANIZED HEALTH CARE EDUCATION/TRAINING PROGRAM

## 2024-01-26 PROCEDURE — 74176 CT ABD & PELVIS W/O CONTRAST: CPT | Performed by: RADIOLOGY

## 2024-01-26 PROCEDURE — 93005 ELECTROCARDIOGRAM TRACING: CPT | Performed by: STUDENT IN AN ORGANIZED HEALTH CARE EDUCATION/TRAINING PROGRAM

## 2024-01-26 PROCEDURE — 25810000003 SODIUM CHLORIDE 0.9 % SOLUTION: Performed by: STUDENT IN AN ORGANIZED HEALTH CARE EDUCATION/TRAINING PROGRAM

## 2024-01-26 PROCEDURE — 71045 X-RAY EXAM CHEST 1 VIEW: CPT

## 2024-01-26 PROCEDURE — 71045 X-RAY EXAM CHEST 1 VIEW: CPT | Performed by: RADIOLOGY

## 2024-01-26 PROCEDURE — 83735 ASSAY OF MAGNESIUM: CPT

## 2024-01-26 PROCEDURE — 85025 COMPLETE CBC W/AUTO DIFF WBC: CPT | Performed by: STUDENT IN AN ORGANIZED HEALTH CARE EDUCATION/TRAINING PROGRAM

## 2024-01-26 PROCEDURE — 36415 COLL VENOUS BLD VENIPUNCTURE: CPT

## 2024-01-26 PROCEDURE — 71250 CT THORAX DX C-: CPT

## 2024-01-26 PROCEDURE — 83690 ASSAY OF LIPASE: CPT | Performed by: STUDENT IN AN ORGANIZED HEALTH CARE EDUCATION/TRAINING PROGRAM

## 2024-01-26 PROCEDURE — 25010000002 ONDANSETRON PER 1 MG: Performed by: STUDENT IN AN ORGANIZED HEALTH CARE EDUCATION/TRAINING PROGRAM

## 2024-01-26 PROCEDURE — 71250 CT THORAX DX C-: CPT | Performed by: RADIOLOGY

## 2024-01-26 PROCEDURE — 93010 ELECTROCARDIOGRAM REPORT: CPT | Performed by: INTERNAL MEDICINE

## 2024-01-26 PROCEDURE — 84484 ASSAY OF TROPONIN QUANT: CPT | Performed by: STUDENT IN AN ORGANIZED HEALTH CARE EDUCATION/TRAINING PROGRAM

## 2024-01-26 RX ORDER — ONDANSETRON 2 MG/ML
4 INJECTION INTRAMUSCULAR; INTRAVENOUS ONCE
Status: COMPLETED | OUTPATIENT
Start: 2024-01-26 | End: 2024-01-26

## 2024-01-26 RX ADMIN — ONDANSETRON 4 MG: 2 INJECTION INTRAMUSCULAR; INTRAVENOUS at 22:44

## 2024-01-26 RX ADMIN — SODIUM CHLORIDE 1000 ML: 9 INJECTION, SOLUTION INTRAVENOUS at 22:43

## 2024-01-26 RX ADMIN — HYDROMORPHONE HYDROCHLORIDE 1 MG: 1 INJECTION, SOLUTION INTRAMUSCULAR; INTRAVENOUS; SUBCUTANEOUS at 22:44

## 2024-01-27 ENCOUNTER — APPOINTMENT (OUTPATIENT)
Dept: ULTRASOUND IMAGING | Facility: HOSPITAL | Age: 69
DRG: 441 | End: 2024-01-27
Payer: MEDICARE

## 2024-01-27 VITALS
OXYGEN SATURATION: 96 % | RESPIRATION RATE: 20 BRPM | HEART RATE: 70 BPM | SYSTOLIC BLOOD PRESSURE: 165 MMHG | DIASTOLIC BLOOD PRESSURE: 98 MMHG | HEIGHT: 65 IN | WEIGHT: 217.15 LBS | BODY MASS INDEX: 36.18 KG/M2 | TEMPERATURE: 96.6 F

## 2024-01-27 PROBLEM — N17.9 AKI (ACUTE KIDNEY INJURY): Status: ACTIVE | Noted: 2024-01-27

## 2024-01-27 LAB
ALBUMIN SERPL-MCNC: 3.4 G/DL (ref 3.5–5.2)
ALBUMIN/GLOB SERPL: 2.1 G/DL
ALP SERPL-CCNC: 138 U/L (ref 39–117)
ALT SERPL W P-5'-P-CCNC: 4439 U/L (ref 1–33)
AMMONIA BLD-SCNC: 37 UMOL/L (ref 11–51)
ANION GAP SERPL CALCULATED.3IONS-SCNC: 12.5 MMOL/L (ref 5–15)
APAP SERPL-MCNC: <5 MCG/ML (ref 0–30)
AST SERPL-CCNC: 5706 U/L (ref 1–32)
BACTERIA UR QL AUTO: ABNORMAL /HPF
BASOPHILS # BLD AUTO: 0.01 10*3/MM3 (ref 0–0.2)
BASOPHILS NFR BLD AUTO: 0.3 % (ref 0–1.5)
BILIRUB SERPL-MCNC: 0.9 MG/DL (ref 0–1.2)
BILIRUB UR QL STRIP: NEGATIVE
BUN SERPL-MCNC: 29 MG/DL (ref 8–23)
BUN/CREAT SERPL: 14.9 (ref 7–25)
CALCIUM SPEC-SCNC: 8.3 MG/DL (ref 8.6–10.5)
CHLORIDE SERPL-SCNC: 99 MMOL/L (ref 98–107)
CK SERPL-CCNC: 651 U/L (ref 20–180)
CLARITY UR: ABNORMAL
CO2 SERPL-SCNC: 23.5 MMOL/L (ref 22–29)
COLOR UR: YELLOW
CREAT SERPL-MCNC: 1.94 MG/DL (ref 0.57–1)
DEPRECATED RDW RBC AUTO: 57.2 FL (ref 37–54)
EGFRCR SERPLBLD CKD-EPI 2021: 27.9 ML/MIN/1.73
EOSINOPHIL # BLD AUTO: 0 10*3/MM3 (ref 0–0.4)
EOSINOPHIL NFR BLD AUTO: 0 % (ref 0.3–6.2)
ERYTHROCYTE [DISTWIDTH] IN BLOOD BY AUTOMATED COUNT: 16.9 % (ref 12.3–15.4)
FERRITIN SERPL-MCNC: ABNORMAL NG/ML (ref 13–150)
GLOBULIN UR ELPH-MCNC: 1.6 GM/DL
GLUCOSE SERPL-MCNC: 83 MG/DL (ref 65–99)
GLUCOSE UR STRIP-MCNC: NEGATIVE MG/DL
GRAN CASTS URNS QL MICRO: ABNORMAL /LPF
HAV IGM SERPL QL IA: NORMAL
HBV CORE IGM SERPL QL IA: NORMAL
HBV SURFACE AG SERPL QL IA: NORMAL
HCT VFR BLD AUTO: 35.3 % (ref 34–46.6)
HCV AB SER DONR QL: NORMAL
HGB BLD-MCNC: 11.4 G/DL (ref 12–15.9)
HGB UR QL STRIP.AUTO: ABNORMAL
HYALINE CASTS UR QL AUTO: ABNORMAL /LPF
IMM GRANULOCYTES # BLD AUTO: 0.01 10*3/MM3 (ref 0–0.05)
IMM GRANULOCYTES NFR BLD AUTO: 0.3 % (ref 0–0.5)
INR PPP: 2.16 (ref 0.9–1.1)
IRON 24H UR-MRATE: 384 MCG/DL (ref 37–145)
IRON SATN MFR SERPL: >101 % (ref 20–50)
KETONES UR QL STRIP: NEGATIVE
LDH SERPL-CCNC: >2500 U/L (ref 135–214)
LEUKOCYTE ESTERASE UR QL STRIP.AUTO: NEGATIVE
LYMPHOCYTES # BLD AUTO: 1.59 10*3/MM3 (ref 0.7–3.1)
LYMPHOCYTES NFR BLD AUTO: 44 % (ref 19.6–45.3)
MAGNESIUM SERPL-MCNC: 2.1 MG/DL (ref 1.6–2.4)
MCH RBC QN AUTO: 29.7 PG (ref 26.6–33)
MCHC RBC AUTO-ENTMCNC: 32.3 G/DL (ref 31.5–35.7)
MCV RBC AUTO: 91.9 FL (ref 79–97)
MONOCYTES # BLD AUTO: 0.09 10*3/MM3 (ref 0.1–0.9)
MONOCYTES NFR BLD AUTO: 2.5 % (ref 5–12)
NEUTROPHILS NFR BLD AUTO: 1.91 10*3/MM3 (ref 1.7–7)
NEUTROPHILS NFR BLD AUTO: 52.9 % (ref 42.7–76)
NITRITE UR QL STRIP: POSITIVE
NRBC BLD AUTO-RTO: 0 /100 WBC (ref 0–0.2)
PH UR STRIP.AUTO: 5.5 [PH] (ref 5–8)
PLATELET # BLD AUTO: 196 10*3/MM3 (ref 140–450)
PMV BLD AUTO: 9.9 FL (ref 6–12)
POTASSIUM SERPL-SCNC: 3.9 MMOL/L (ref 3.5–5.2)
PROT SERPL-MCNC: 5 G/DL (ref 6–8.5)
PROT UR QL STRIP: ABNORMAL
PROTHROMBIN TIME: 24.7 SECONDS (ref 12.1–14.7)
RBC # BLD AUTO: 3.84 10*6/MM3 (ref 3.77–5.28)
RBC # UR STRIP: ABNORMAL /HPF
REF LAB TEST METHOD: ABNORMAL
SALICYLATES SERPL-MCNC: <0.3 MG/DL
SODIUM SERPL-SCNC: 135 MMOL/L (ref 136–145)
SP GR UR STRIP: 1.01 (ref 1–1.03)
SQUAMOUS #/AREA URNS HPF: ABNORMAL /HPF
TIBC SERPL-MCNC: 359 MCG/DL (ref 298–536)
TRANSFERRIN SERPL-MCNC: 241 MG/DL (ref 200–360)
URATE SERPL-MCNC: 8.8 MG/DL (ref 2.4–5.7)
UROBILINOGEN UR QL STRIP: ABNORMAL
WBC # UR STRIP: ABNORMAL /HPF
WBC NRBC COR # BLD AUTO: 3.61 10*3/MM3 (ref 3.4–10.8)

## 2024-01-27 PROCEDURE — 81256 HFE GENE: CPT | Performed by: STUDENT IN AN ORGANIZED HEALTH CARE EDUCATION/TRAINING PROGRAM

## 2024-01-27 PROCEDURE — 83516 IMMUNOASSAY NONANTIBODY: CPT | Performed by: STUDENT IN AN ORGANIZED HEALTH CARE EDUCATION/TRAINING PROGRAM

## 2024-01-27 PROCEDURE — 85025 COMPLETE CBC W/AUTO DIFF WBC: CPT | Performed by: INTERNAL MEDICINE

## 2024-01-27 PROCEDURE — 86235 NUCLEAR ANTIGEN ANTIBODY: CPT | Performed by: STUDENT IN AN ORGANIZED HEALTH CARE EDUCATION/TRAINING PROGRAM

## 2024-01-27 PROCEDURE — 25810000003 SODIUM CHLORIDE 0.9 % SOLUTION: Performed by: STUDENT IN AN ORGANIZED HEALTH CARE EDUCATION/TRAINING PROGRAM

## 2024-01-27 PROCEDURE — 84550 ASSAY OF BLOOD/URIC ACID: CPT | Performed by: STUDENT IN AN ORGANIZED HEALTH CARE EDUCATION/TRAINING PROGRAM

## 2024-01-27 PROCEDURE — 25010000002 METHYLPREDNISOLONE PER 125 MG: Performed by: STUDENT IN AN ORGANIZED HEALTH CARE EDUCATION/TRAINING PROGRAM

## 2024-01-27 PROCEDURE — 25010000002 MORPHINE PER 10 MG

## 2024-01-27 PROCEDURE — 86381 MITOCHONDRIAL ANTIBODY EACH: CPT | Performed by: STUDENT IN AN ORGANIZED HEALTH CARE EDUCATION/TRAINING PROGRAM

## 2024-01-27 PROCEDURE — 83615 LACTATE (LD) (LDH) ENZYME: CPT | Performed by: STUDENT IN AN ORGANIZED HEALTH CARE EDUCATION/TRAINING PROGRAM

## 2024-01-27 PROCEDURE — 86038 ANTINUCLEAR ANTIBODIES: CPT | Performed by: STUDENT IN AN ORGANIZED HEALTH CARE EDUCATION/TRAINING PROGRAM

## 2024-01-27 PROCEDURE — 83540 ASSAY OF IRON: CPT | Performed by: STUDENT IN AN ORGANIZED HEALTH CARE EDUCATION/TRAINING PROGRAM

## 2024-01-27 PROCEDURE — 99236 HOSP IP/OBS SAME DATE HI 85: CPT | Performed by: STUDENT IN AN ORGANIZED HEALTH CARE EDUCATION/TRAINING PROGRAM

## 2024-01-27 PROCEDURE — 94664 DEMO&/EVAL PT USE INHALER: CPT

## 2024-01-27 PROCEDURE — 25010000002 PROCHLORPERAZINE 10 MG/2ML SOLUTION: Performed by: INTERNAL MEDICINE

## 2024-01-27 PROCEDURE — 86036 ANCA SCREEN EACH ANTIBODY: CPT | Performed by: STUDENT IN AN ORGANIZED HEALTH CARE EDUCATION/TRAINING PROGRAM

## 2024-01-27 PROCEDURE — 94799 UNLISTED PULMONARY SVC/PX: CPT

## 2024-01-27 PROCEDURE — 82728 ASSAY OF FERRITIN: CPT | Performed by: STUDENT IN AN ORGANIZED HEALTH CARE EDUCATION/TRAINING PROGRAM

## 2024-01-27 PROCEDURE — 0 DEXTROSE 5 % SOLUTION 250 ML FLEX CONT: Performed by: STUDENT IN AN ORGANIZED HEALTH CARE EDUCATION/TRAINING PROGRAM

## 2024-01-27 PROCEDURE — 86015 ACTIN ANTIBODY EACH: CPT | Performed by: STUDENT IN AN ORGANIZED HEALTH CARE EDUCATION/TRAINING PROGRAM

## 2024-01-27 PROCEDURE — 82550 ASSAY OF CK (CPK): CPT | Performed by: STUDENT IN AN ORGANIZED HEALTH CARE EDUCATION/TRAINING PROGRAM

## 2024-01-27 PROCEDURE — 82140 ASSAY OF AMMONIA: CPT | Performed by: STUDENT IN AN ORGANIZED HEALTH CARE EDUCATION/TRAINING PROGRAM

## 2024-01-27 PROCEDURE — 25010000002 ACETYLCYSTEINE PER 100 MG: Performed by: STUDENT IN AN ORGANIZED HEALTH CARE EDUCATION/TRAINING PROGRAM

## 2024-01-27 PROCEDURE — 81001 URINALYSIS AUTO W/SCOPE: CPT | Performed by: INTERNAL MEDICINE

## 2024-01-27 PROCEDURE — 94640 AIRWAY INHALATION TREATMENT: CPT

## 2024-01-27 PROCEDURE — 80179 DRUG ASSAY SALICYLATE: CPT | Performed by: STUDENT IN AN ORGANIZED HEALTH CARE EDUCATION/TRAINING PROGRAM

## 2024-01-27 PROCEDURE — 25810000003 SODIUM CHLORIDE 0.9 % SOLUTION: Performed by: INTERNAL MEDICINE

## 2024-01-27 PROCEDURE — 94761 N-INVAS EAR/PLS OXIMETRY MLT: CPT

## 2024-01-27 PROCEDURE — 80143 DRUG ASSAY ACETAMINOPHEN: CPT | Performed by: STUDENT IN AN ORGANIZED HEALTH CARE EDUCATION/TRAINING PROGRAM

## 2024-01-27 PROCEDURE — 86225 DNA ANTIBODY NATIVE: CPT | Performed by: STUDENT IN AN ORGANIZED HEALTH CARE EDUCATION/TRAINING PROGRAM

## 2024-01-27 PROCEDURE — 85610 PROTHROMBIN TIME: CPT | Performed by: STUDENT IN AN ORGANIZED HEALTH CARE EDUCATION/TRAINING PROGRAM

## 2024-01-27 PROCEDURE — 84466 ASSAY OF TRANSFERRIN: CPT | Performed by: STUDENT IN AN ORGANIZED HEALTH CARE EDUCATION/TRAINING PROGRAM

## 2024-01-27 PROCEDURE — 0 DEXTROSE 5 % SOLUTION 500 ML FLEX CONT: Performed by: STUDENT IN AN ORGANIZED HEALTH CARE EDUCATION/TRAINING PROGRAM

## 2024-01-27 PROCEDURE — 86376 MICROSOMAL ANTIBODY EACH: CPT | Performed by: STUDENT IN AN ORGANIZED HEALTH CARE EDUCATION/TRAINING PROGRAM

## 2024-01-27 PROCEDURE — 25010000002 PHYTONADIONE 10 MG/ML SOLUTION 1 ML AMPULE: Performed by: STUDENT IN AN ORGANIZED HEALTH CARE EDUCATION/TRAINING PROGRAM

## 2024-01-27 PROCEDURE — 25010000002 ONDANSETRON PER 1 MG: Performed by: INTERNAL MEDICINE

## 2024-01-27 PROCEDURE — 80053 COMPREHEN METABOLIC PANEL: CPT | Performed by: INTERNAL MEDICINE

## 2024-01-27 RX ORDER — BISACODYL 10 MG
10 SUPPOSITORY, RECTAL RECTAL DAILY PRN
Status: DISCONTINUED | OUTPATIENT
Start: 2024-01-27 | End: 2024-01-27 | Stop reason: HOSPADM

## 2024-01-27 RX ORDER — BUDESONIDE AND FORMOTEROL FUMARATE DIHYDRATE 160; 4.5 UG/1; UG/1
2 AEROSOL RESPIRATORY (INHALATION)
Status: DISCONTINUED | OUTPATIENT
Start: 2024-01-27 | End: 2024-01-27 | Stop reason: HOSPADM

## 2024-01-27 RX ORDER — NALOXONE HYDROCHLORIDE 4 MG/.1ML
1 SPRAY NASAL AS NEEDED
COMMUNITY
End: 2024-01-27 | Stop reason: HOSPADM

## 2024-01-27 RX ORDER — METHYLPREDNISOLONE SODIUM SUCCINATE 125 MG/2ML
125 INJECTION, POWDER, LYOPHILIZED, FOR SOLUTION INTRAMUSCULAR; INTRAVENOUS ONCE
Status: COMPLETED | OUTPATIENT
Start: 2024-01-27 | End: 2024-01-27

## 2024-01-27 RX ORDER — SODIUM CHLORIDE 9 MG/ML
40 INJECTION, SOLUTION INTRAVENOUS AS NEEDED
Status: DISCONTINUED | OUTPATIENT
Start: 2024-01-27 | End: 2024-01-27 | Stop reason: HOSPADM

## 2024-01-27 RX ORDER — ONDANSETRON 4 MG/1
8 TABLET, FILM COATED ORAL EVERY 8 HOURS PRN
Status: DISCONTINUED | OUTPATIENT
Start: 2024-01-27 | End: 2024-01-27 | Stop reason: HOSPADM

## 2024-01-27 RX ORDER — IPRATROPIUM BROMIDE AND ALBUTEROL SULFATE 2.5; .5 MG/3ML; MG/3ML
3 SOLUTION RESPIRATORY (INHALATION) EVERY 4 HOURS PRN
Status: DISCONTINUED | OUTPATIENT
Start: 2024-01-27 | End: 2024-01-27 | Stop reason: HOSPADM

## 2024-01-27 RX ORDER — DIPHENHYDRAMINE HYDROCHLORIDE AND LIDOCAINE HYDROCHLORIDE AND ALUMINUM HYDROXIDE AND MAGNESIUM HYDRO
10 KIT EVERY 6 HOURS PRN
Status: DISCONTINUED | OUTPATIENT
Start: 2024-01-27 | End: 2024-01-27

## 2024-01-27 RX ORDER — NALOXONE HYDROCHLORIDE 4 MG/.1ML
1 SPRAY NASAL AS NEEDED
Status: CANCELLED | OUTPATIENT
Start: 2024-01-27

## 2024-01-27 RX ORDER — OXYCODONE HYDROCHLORIDE 10 MG/1
10 TABLET ORAL EVERY 6 HOURS PRN
Start: 2024-01-27 | End: 2024-02-01

## 2024-01-27 RX ORDER — PROCHLORPERAZINE MALEATE 10 MG
10 TABLET ORAL EVERY 6 HOURS PRN
COMMUNITY
End: 2024-01-27 | Stop reason: HOSPADM

## 2024-01-27 RX ORDER — PANTOPRAZOLE SODIUM 40 MG/1
40 TABLET, DELAYED RELEASE ORAL
Status: DISCONTINUED | OUTPATIENT
Start: 2024-01-27 | End: 2024-01-27 | Stop reason: HOSPADM

## 2024-01-27 RX ORDER — BISACODYL 5 MG/1
5 TABLET, DELAYED RELEASE ORAL DAILY PRN
Status: DISCONTINUED | OUTPATIENT
Start: 2024-01-27 | End: 2024-01-27 | Stop reason: HOSPADM

## 2024-01-27 RX ORDER — OXYCODONE HYDROCHLORIDE 10 MG/1
10 TABLET ORAL EVERY 6 HOURS PRN
Status: DISCONTINUED | OUTPATIENT
Start: 2024-01-27 | End: 2024-01-27 | Stop reason: HOSPADM

## 2024-01-27 RX ORDER — SODIUM CHLORIDE 0.9 % (FLUSH) 0.9 %
10 SYRINGE (ML) INJECTION AS NEEDED
Status: DISCONTINUED | OUTPATIENT
Start: 2024-01-27 | End: 2024-01-27 | Stop reason: HOSPADM

## 2024-01-27 RX ORDER — ONDANSETRON 2 MG/ML
4 INJECTION INTRAMUSCULAR; INTRAVENOUS EVERY 6 HOURS PRN
Start: 2024-01-27

## 2024-01-27 RX ORDER — NITROGLYCERIN 0.4 MG/1
0.4 TABLET SUBLINGUAL
Status: DISCONTINUED | OUTPATIENT
Start: 2024-01-27 | End: 2024-01-27 | Stop reason: HOSPADM

## 2024-01-27 RX ORDER — DIPHENHYDRAMINE HYDROCHLORIDE AND LIDOCAINE HYDROCHLORIDE AND ALUMINUM HYDROXIDE AND MAGNESIUM HYDRO
10 KIT EVERY 6 HOURS
Status: DISCONTINUED | OUTPATIENT
Start: 2024-01-27 | End: 2024-01-27 | Stop reason: HOSPADM

## 2024-01-27 RX ORDER — FLUCONAZOLE 200 MG/1
200 TABLET ORAL DAILY
COMMUNITY
Start: 2024-01-24 | End: 2024-01-27 | Stop reason: HOSPADM

## 2024-01-27 RX ORDER — METHYLPREDNISOLONE SODIUM SUCCINATE 125 MG/2ML
125 INJECTION, POWDER, LYOPHILIZED, FOR SOLUTION INTRAMUSCULAR; INTRAVENOUS ONCE
Start: 2024-01-27 | End: 2024-01-27

## 2024-01-27 RX ORDER — SODIUM CHLORIDE 0.9 % (FLUSH) 0.9 %
10 SYRINGE (ML) INJECTION EVERY 12 HOURS SCHEDULED
Status: DISCONTINUED | OUTPATIENT
Start: 2024-01-27 | End: 2024-01-27 | Stop reason: HOSPADM

## 2024-01-27 RX ORDER — POLYETHYLENE GLYCOL 3350 17 G/17G
17 POWDER, FOR SOLUTION ORAL DAILY PRN
Status: DISCONTINUED | OUTPATIENT
Start: 2024-01-27 | End: 2024-01-27 | Stop reason: HOSPADM

## 2024-01-27 RX ORDER — FLUCONAZOLE 200 MG/1
200 TABLET ORAL DAILY
Status: CANCELLED | OUTPATIENT
Start: 2024-01-27 | End: 2024-01-30

## 2024-01-27 RX ORDER — DIPHENHYDRAMINE HYDROCHLORIDE AND LIDOCAINE HYDROCHLORIDE AND ALUMINUM HYDROXIDE AND MAGNESIUM HYDRO
10 KIT EVERY 6 HOURS
Start: 2024-01-27

## 2024-01-27 RX ORDER — ONDANSETRON 2 MG/ML
4 INJECTION INTRAMUSCULAR; INTRAVENOUS EVERY 6 HOURS PRN
Status: DISCONTINUED | OUTPATIENT
Start: 2024-01-27 | End: 2024-01-27 | Stop reason: HOSPADM

## 2024-01-27 RX ORDER — PROCHLORPERAZINE MALEATE 10 MG
10 TABLET ORAL EVERY 6 HOURS PRN
Status: CANCELLED | OUTPATIENT
Start: 2024-01-27

## 2024-01-27 RX ORDER — PANTOPRAZOLE SODIUM 40 MG/1
40 TABLET, DELAYED RELEASE ORAL
Start: 2024-01-28

## 2024-01-27 RX ORDER — PROCHLORPERAZINE EDISYLATE 5 MG/ML
5 INJECTION INTRAMUSCULAR; INTRAVENOUS EVERY 6 HOURS PRN
Status: DISCONTINUED | OUTPATIENT
Start: 2024-01-27 | End: 2024-01-27 | Stop reason: HOSPADM

## 2024-01-27 RX ORDER — SODIUM CHLORIDE 9 MG/ML
100 INJECTION, SOLUTION INTRAVENOUS CONTINUOUS
Status: DISCONTINUED | OUTPATIENT
Start: 2024-01-27 | End: 2024-01-27 | Stop reason: HOSPADM

## 2024-01-27 RX ORDER — MORPHINE SULFATE 2 MG/ML
2 INJECTION, SOLUTION INTRAMUSCULAR; INTRAVENOUS
Status: DISCONTINUED | OUTPATIENT
Start: 2024-01-27 | End: 2024-01-27 | Stop reason: HOSPADM

## 2024-01-27 RX ORDER — AMOXICILLIN 250 MG
2 CAPSULE ORAL 2 TIMES DAILY
Status: DISCONTINUED | OUTPATIENT
Start: 2024-01-27 | End: 2024-01-27 | Stop reason: HOSPADM

## 2024-01-27 RX ORDER — BENZONATATE 100 MG/1
100 CAPSULE ORAL 4 TIMES DAILY PRN
Status: DISCONTINUED | OUTPATIENT
Start: 2024-01-27 | End: 2024-01-27 | Stop reason: HOSPADM

## 2024-01-27 RX ADMIN — DIPHENHYDRAMINE HYDROCHLORIDE AND LIDOCAINE HYDROCHLORIDE AND ALUMINUM HYDROXIDE AND MAGNESIUM HYDRO 10 ML: KIT at 08:47

## 2024-01-27 RX ADMIN — METHYLPREDNISOLONE SODIUM SUCCINATE 125 MG: 125 INJECTION, POWDER, FOR SOLUTION INTRAMUSCULAR; INTRAVENOUS at 15:11

## 2024-01-27 RX ADMIN — ONDANSETRON 4 MG: 2 INJECTION INTRAMUSCULAR; INTRAVENOUS at 09:07

## 2024-01-27 RX ADMIN — DIPHENHYDRAMINE HYDROCHLORIDE AND LIDOCAINE HYDROCHLORIDE AND ALUMINUM HYDROXIDE AND MAGNESIUM HYDRO 10 ML: KIT at 19:57

## 2024-01-27 RX ADMIN — PANTOPRAZOLE SODIUM 40 MG: 40 TABLET, DELAYED RELEASE ORAL at 11:56

## 2024-01-27 RX ADMIN — BUDESONIDE AND FORMOTEROL FUMARATE DIHYDRATE 2 PUFF: 160; 4.5 AEROSOL RESPIRATORY (INHALATION) at 12:02

## 2024-01-27 RX ADMIN — APIXABAN 5 MG: 5 TABLET, FILM COATED ORAL at 20:00

## 2024-01-27 RX ADMIN — PHYTONADIONE 10 MG: 10 INJECTION, EMULSION INTRAMUSCULAR; INTRAVENOUS; SUBCUTANEOUS at 15:11

## 2024-01-27 RX ADMIN — IPRATROPIUM BROMIDE 0.5 MG: 0.5 SOLUTION RESPIRATORY (INHALATION) at 07:24

## 2024-01-27 RX ADMIN — NYSTATIN 500000 UNITS: 100000 SUSPENSION ORAL at 18:08

## 2024-01-27 RX ADMIN — MORPHINE SULFATE 2 MG: 2 INJECTION, SOLUTION INTRAMUSCULAR; INTRAVENOUS at 06:02

## 2024-01-27 RX ADMIN — Medication 10 ML: at 20:03

## 2024-01-27 RX ADMIN — BUDESONIDE AND FORMOTEROL FUMARATE DIHYDRATE 2 PUFF: 160; 4.5 AEROSOL RESPIRATORY (INHALATION) at 19:16

## 2024-01-27 RX ADMIN — ACETYLCYSTEINE 14780 MG: 6 INJECTION, SOLUTION INTRAVENOUS at 16:07

## 2024-01-27 RX ADMIN — OXYCODONE HYDROCHLORIDE 10 MG: 10 TABLET ORAL at 18:07

## 2024-01-27 RX ADMIN — SODIUM CHLORIDE 150 ML/HR: 9 INJECTION, SOLUTION INTRAVENOUS at 05:00

## 2024-01-27 RX ADMIN — SODIUM CHLORIDE 2000 ML: 9 INJECTION, SOLUTION INTRAVENOUS at 13:41

## 2024-01-27 RX ADMIN — NYSTATIN 500000 UNITS: 100000 SUSPENSION ORAL at 11:55

## 2024-01-27 RX ADMIN — IPRATROPIUM BROMIDE 0.5 MG: 0.5 SOLUTION RESPIRATORY (INHALATION) at 19:16

## 2024-01-27 RX ADMIN — APIXABAN 5 MG: 5 TABLET, FILM COATED ORAL at 11:56

## 2024-01-27 RX ADMIN — NYSTATIN 500000 UNITS: 100000 SUSPENSION ORAL at 20:00

## 2024-01-27 RX ADMIN — ACETYLCYSTEINE 4920 MG: 6 INJECTION, SOLUTION INTRAVENOUS at 17:42

## 2024-01-27 RX ADMIN — PROCHLORPERAZINE EDISYLATE 5 MG: 5 INJECTION INTRAMUSCULAR; INTRAVENOUS at 05:12

## 2024-01-27 RX ADMIN — DIPHENHYDRAMINE HYDROCHLORIDE AND LIDOCAINE HYDROCHLORIDE AND ALUMINUM HYDROXIDE AND MAGNESIUM HYDRO 10 ML: KIT at 13:45

## 2024-01-27 RX ADMIN — IPRATROPIUM BROMIDE 0.5 MG: 0.5 SOLUTION RESPIRATORY (INHALATION) at 12:02

## 2024-01-27 NOTE — H&P
[facility] Medicine Services  HISTORY & PHYSICAL    Patient Identification:  Name:  Savi Villalpando  Age:  68 y.o.  Sex:  female  :  1955  MRN:  [id number]   Visit Number:  [id number]  Admit Date: 2024   Primary Care Physician:  Camila Soto DO     Subjective     Chief complaint:   Chief Complaint   Patient presents with    Shortness of Breath    Dizziness    Weakness - Generalized     History of presenting illness:   Patient is a 68 y.o. female with past medical history significant for lung cancer, diverticulitis, obesity, anxiety and depression that presented to the [facility] emergency department for evaluation of dizziness, shortness of breath, and generalized weakness.  Patient advises that once she woke up this morning she started experiencing some shortness of breath, she advises that shortness of breath was occurring both at rest and on exertion.  Patient states that she does use supplemental oxygen at home as needed.  Patient also advises that she was experiencing diarrhea along with generalized weakness.  She advises that the symptoms intensified to the point seeking medical attention in the emergency department today.  Patient is undergoing treatment currently for lung cancer, lung cancer has metastasized to the brain and now it appears to the liver.  Patient's liver function test extremely elevated, CT does note lesions in the liver.  Patient states that the liver metastasis is negative.  Patient advises that she was seen in the [facility] earlier in the week.  Patient denies any abdominal pain or chest pain.  Upon my physical examination patient she is alert and orient x 3, no acute distress noted.  The patient is in stable condition upon admission to the medical surgical unit.Upon arrival to the ED, vitals were temperature 98, /79, heart rate 94, respirations 16, and SpO2 91% Labs obtained on arrival: Initial troponin 124, reflex  troponin 114.  Sodium 135, chloride 97 anion gap 15.3, BUN 27, creatinine 2.10, GFR 25.4, AST 3318, and AST 3123. Patient has been admitted to the medical surgical for further evaluation and treatment.     Present during exam:N/A  ---------------------------------------------------------------------------------------------------------------------   Review of Systems   Constitutional: Negative.  Negative for chills, fatigue and fever.   HENT: Negative.  Negative for congestion, sore throat and trouble swallowing.    Eyes: Negative.    Respiratory: Negative.  Positive for shortness of breath. Negative for wheezing.    Cardiovascular: Negative.  Negative for chest pain, palpitations and leg swelling.   Gastrointestinal:  Positive for diarrhea. Negative for abdominal pain, nausea and vomiting.   Endocrine: Negative.    Genitourinary: Negative.  Negative for dysuria.   Musculoskeletal: Negative.  Negative for neck pain and neck stiffness.   Allergic/Immunologic: Negative.    Neurological: Negative.  Negative for dizziness, tremors, seizures, syncope, facial asymmetry, speech difficulty, weakness, light-headedness, numbness and headaches.   Hematological: Negative.    Psychiatric/Behavioral: Negative.         Otherwise 10-system ROS reviewed and is negative except as mentioned in the HPI.    ---------------------------------------------------------------------------------------------------------------------   Past Medical History:   Diagnosis Date    Allergic 1955    Allergies     Anxiety 1970    Arthritis 2010    Asthma 1956    Cholelithiasis 1976    Depression 1979    Post partum    Diverticulitis     Diverticulitis of colon Nov. 2022    Diverticulosis 2022    Elevated liver enzymes     GERD (gastroesophageal reflux disease) 2023    HL (hearing loss) 2011    Lung cancer     stage 4    Obesity 1997    PONV (postoperative nausea and vomiting) 1990?    Ever since unless given anti-nausea before.    Rectal bleeding Nov.  2022    Rib fracture     Urinary tract infection 2023     Past Surgical History:   Procedure Laterality Date    BILATERAL BREAST REDUCTION      3 lb removal    BRAIN SURGERY N/A     Gamma knife surgery to remove brain mets    BREAST BIOPSY  1990    It was a fibrous mass about golf ball sized    BREAST CYST ASPIRATION  1990    Noted above    BREAST EXCISIONAL BIOPSY Right 1989    benign    BREAST SURGERY  2021    CERVICAL FUSION      C567 partial 8    CHOLECYSTECTOMY      COLONOSCOPY  2023 July    COSMETIC SURGERY  2021    EYE SURGERY  2000    HAND SURGERY      x3    HYSTERECTOMY Bilateral 1999    total-benign    LYMPH NODE DISSECTION      back of neck    REDUCTION MAMMAPLASTY Bilateral 2019    TRIGGER FINGER RELEASE       Family History   Problem Relation Age of Onset    Arthritis Mother     Mental illness Mother     Heart disease Father     Alcohol abuse Sister     Drug abuse Sister     Miscarriages / Stillbirths Daughter         Miscarriage    Asthma Maternal Grandmother     Hearing loss Maternal Grandmother     Cancer Maternal Uncle     Breast cancer Neg Hx      Social History     Socioeconomic History    Marital status:    Tobacco Use    Smoking status: Never    Smokeless tobacco: Never    Tobacco comments:     Both parents smoked   Vaping Use    Vaping Use: Never used   Substance and Sexual Activity    Alcohol use: Not Currently     Alcohol/week: 3.0 standard drinks of alcohol     Types: 3 Glasses of wine per week     Comment: This is for pain management.    Drug use: Never    Sexual activity: Not Currently     Partners: Male     Birth control/protection: Post-menopausal, Hysterectomy     ---------------------------------------------------------------------------------------------------------------------   Allergies:  Fluad quadrivalent [influenza vac a&b sa adj quad], Metronidazole, Multi complete-iron [anti-oxidant], Other, Sertraline, Shellfish-derived products, Sulfa antibiotics, Macrobid  [nitrofurantoin], Morphine, and Sulfate  ---------------------------------------------------------------------------------------------------------------------   Medications below are reported home medications pulling from within the system; at this time, these medications have not been reconciled unless otherwise specified and are in the verification process for further verifcation as current home medications.    Prior to Admission Medications       Prescriptions Last Dose Informant Patient Reported? Taking?    benzonatate (Tessalon Perles) 100 MG capsule   No No    Take 1 capsule by mouth 4 (Four) Times a Day As Needed (cough).    calcium carbonate EX (TUMS EX) 750 MG chewable tablet   Yes No    Chew 1 tablet.    CALCIUM PO   Yes No    Take  by mouth.    Eliquis DVT/PE Starter Pack tablet therapy pack   Yes No    Take two 5 mg tablets by mouth every 12 hours for 7 days. Followed by one 5 mg tablet every 12 hours. (Dispense starter pack if available) Stopped for surgery    Enoxaparin Sodium (LOVENOX) 100 MG/ML solution prefilled syringe syringe   Yes No    Inject 1 mL under the skin into the appropriate area as directed.    famotidine (Pepcid) 40 MG tablet   No No    Take 1 tablet by mouth Daily.    fluticasone-salmeterol (Advair HFA) 230-21 MCG/ACT inhaler   Yes No    Inhale 3 puffs.    levalbuterol (XOPENEX HFA) 45 MCG/ACT inhaler   No No    Inhale 2 puffs Every 4 (Four) Hours As Needed for Shortness of Air.    levocetirizine (Xyzal Allergy 24HR) 5 MG tablet   No No    Take 0.5 tablets by mouth Every Evening.    levoFLOXacin (Levaquin) 500 MG tablet   No No    Take 1 tablet by mouth Daily.    nystatin (MYCOSTATIN) 100,000 unit/mL suspension   No No    Swish and swallow 5 mL 4 (Four) Times a Day. Use for 10 days.    omeprazole (priLOSEC) 20 MG capsule   Yes No    1 capsule 2 (Two) Times a Day.    ondansetron (ZOFRAN) 8 MG tablet   Yes No    Take 1 tablet by mouth.    Pralsetinib 100 MG capsule   Yes No    Take 400  mg by mouth Daily.    promethazine (PHENERGAN) 25 MG tablet   Yes No    1 tablet.          ---------------------------------------------------------------------------------------------------------------------    Objective     Hospital Scheduled Meds:    sodium chloride, 150 mL/hr, Last Rate: 150 mL/hr (01/27/24 0500)        Current listed hospital scheduled medications may not yet reflect those currently placed in orders that are signed and held, awaiting patient's arrival to floor/unit.    ---------------------------------------------------------------------------------------------------------------------   Vital Signs:  Temp:  [98 °F (36.7 °C)-98.2 °F (36.8 °C)] 98.2 °F (36.8 °C)  Heart Rate:  [70-94] 70  Resp:  [16] 16  BP: (105-111)/(73-79) 111/73  Mean Arterial Pressure (Non-Invasive) for the past 24 hrs (Last 3 readings):   Noninvasive MAP (mmHg)   01/27/24 0410 91     SpO2 Percentage    01/26/24 2129 01/27/24 0410   SpO2: 91% 95%     SpO2:  [91 %-95 %] 95 %  on   ;   Device (Oxygen Therapy): room air    Body mass index is 36.14 kg/m².  Wt Readings from Last 3 Encounters:   01/27/24 98.5 kg (217 lb 2.5 oz)   01/23/24 96.6 kg (213 lb)   12/21/23 100 kg (221 lb)       ---------------------------------------------------------------------------------------------------------------------   Physical Exam  Vitals reviewed.   Constitutional:       General: She is awake. She is not in acute distress.     Appearance: Normal appearance. She is obese. She is not ill-appearing or diaphoretic.   HENT:      Head: Normocephalic and atraumatic.      Nose: Nose normal.      Mouth/Throat:      Mouth: Mucous membranes are moist.      Pharynx: Oropharynx is clear.   Eyes:      Extraocular Movements: Extraocular movements intact.      Pupils: Pupils are equal, round, and reactive to light.   Cardiovascular:      Rate and Rhythm: Normal rate and regular rhythm.      Pulses: Normal pulses.           Dorsalis pedis pulses are 2+ on the  right side and 2+ on the left side.      Heart sounds: Normal heart sounds. No murmur heard.     No friction rub.   Pulmonary:      Effort: Pulmonary effort is normal. No accessory muscle usage, respiratory distress or retractions.      Breath sounds: No wheezing, rhonchi or rales.      Comments: Breath sounds decreased in all lobes.  Abdominal:      General: Bowel sounds are normal. There is no distension.      Palpations: Abdomen is soft.      Tenderness: There is no abdominal tenderness. There is no guarding.   Musculoskeletal:         General: Normal range of motion.      Cervical back: Normal range of motion and neck supple. No rigidity.      Right lower leg: No edema.      Left lower leg: No edema.   Skin:     General: Skin is warm and dry.      Capillary Refill: Capillary refill takes 2 to 3 seconds.   Neurological:      General: No focal deficit present.      Mental Status: She is alert and oriented to person, place, and time. Mental status is at baseline.      Cranial Nerves: No dysarthria or facial asymmetry.      Sensory: Sensation is intact.      Motor: No weakness or tremor.   Psychiatric:         Attention and Perception: Attention normal.         Mood and Affect: Mood normal.         Speech: Speech normal.         Behavior: Behavior normal. Behavior is cooperative.         Thought Content: Thought content normal.         Cognition and Memory: Cognition normal.         Judgment: Judgment normal.          ---------------------------------------------------------------------------------------------------------------------  EKG: Sinus rhythm, unconfirmed cardiology      Telemetry:    Normal sinus rhythm    I have personally reviewed the EKG/Telemetry strip  ---------------------------------------------------------------------------------------------------------------------   Results from last 7 days   Lab Units 01/26/24  2323 01/26/24  2149   HSTROP T ng/L 114* 134*           Results from last 7 days  "  Lab Units 01/26/24 2149 01/24/24  0900   WBC 10*3/mm3 4.37 5.31   HEMOGLOBIN g/dL 12.2 12.8   HEMATOCRIT % 38.1 37.8   MCV fL 92.0 89   MCHC g/dL 32.0 33.9   PLATELETS 10*3/mm3 234 418*   INR   --  1.3*     Results from last 7 days   Lab Units 01/26/24 2323 01/26/24 2149 01/24/24  0900   SODIUM mmol/L  --  135*  --    POTASSIUM mmol/L  --  4.4  --    MAGNESIUM mg/dL 2.1  --  1.9   CHLORIDE mmol/L  --  97*  --    CO2 mmol/L  --  22.7  --    BUN mg/dL  --  27*  --    CREATININE mg/dL  --  2.10*  --    CALCIUM mg/dL  --  8.7  --    GLUCOSE mg/dL  --  107*  --    ALBUMIN g/dL  --  3.6  --    BILIRUBIN mg/dL  --  1.0  --    ALK PHOS U/L  --  93  --    AST (SGOT) U/L  --  3,318*  --    ALT (SGPT) U/L  --  3,123*  --    Estimated Creatinine Clearance: 30.2 mL/min (A) (by C-G formula based on SCr of 2.1 mg/dL (H)).  No results found for: \"AMMONIA\"    No results found for: \"HGBA1C\", \"POCGLU\"  No results found for: \"HGBA1C\"  Lab Results   Component Value Date    TSH 0.799 10/25/2022       Microbiology Results (last 10 days)       Procedure Component Value - Date/Time    Urine Culture - Urine, Urine, Clean Catch [[id number]] Collected: 01/23/24 1512    Lab Status: Final result Specimen: Urine, Clean Catch Updated: 01/24/24 1241     Urine Culture >100,000 CFU/mL Mixed Vita Isolated    Narrative:      Specimen contains mixed organisms of questionable pathogenicity suggestive of contamination. If symptoms persist, suggest recollection.  Colonization of the urinary tract without infection is common. Treatment is discouraged unless the patient is symptomatic, pregnant, or undergoing an invasive urologic procedure.           Pain Management Panel          Latest Ref Rng & Units 1/2/2024   Pain Management Panel   Barbiturates Screen, Urine Cutoff: 200 ng/mL Negative       Benzodiazepine Screen, Urine Cutoff: 200 ng/mL Negative       Buprenorphine, Screen, Urine <10 ng/mL  <50 ng/mL <10     <50       Cocaine Screen, Urine Cutoff: " 300 ng/mL  <50 ng/mL Negative     <50       Fentanyl, Urine Cutoff: 1 ng/mL Negative       Hydromorphone <50 ng/mL <50       Methadone Screen , Urine Cutoff: 300 ng/mL Negative          Details          This result is from an external source.    Multiple values from one day are sorted in reverse-chronological order             I have personally reviewed the above laboratory results.   ---------------------------------------------------------------------------------------------------------------------  Imaging Results (Last 7 Days)       Procedure Component Value Units Date/Time    CT Chest Without Contrast Diagnostic [[id number]] Collected: 01/27/24 0303     Updated: 01/27/24 0313    Narrative:      EXAMINATION: CT chest, abdomen and pelvis without contrast     CLINICAL HISTORY: Known lung cancer with metastasis. Shortness of  breath. Abdominal pain.     COMPARISON: None.     TECHNIQUE: Contiguous 3 mm thick slices were obtained through the chest,  abdomen and pelvis without contrast. Coronal and sagittal reformats were  performed.     FINDINGS:     CHEST:  The thoracic aorta is normal in caliber. No aneurysmal dilatation. Large  right and small left pleural effusions are noted. No pericardial  effusion is appreciated. There are innumerable pulmonary nodules  throughout both lungs consistent with metastatic disease. There is  thickening of the interlobular septa within both lungs. This may be  related to interstitial spread of tumor. However, edema could result in  a similar appearance. There is more confluent density within the left  lower lobe as well as within the left perihilar region extending into  the left upper lobe. This may be infectious in etiology representing  pneumonia. However, malignancy cannot be excluded. There are multiple  prominent mediastinal nodes. A right paratracheal node measures 1.7 x  1.4 cm in AP and transverse dimensions. A precarinal node measures  approximately 2.0 x 1.9 cm in AP and  transverse dimensions.     ABDOMEN:  There are multiple intermediate to low-attenuation foci throughout the  liver. The findings are consistent with metastatic disease. This can be  further evaluated with MRI if not characterized previously. The spleen  appears unremarkable. There are multiple retroperitoneal nodes including  within the retrocrural space extending more inferiorly into the  periaortic space. There is a masslike area within the suprarenal area  within the left kidney. This likely involves the left adrenal gland.  This measures 4.6 x 2.7 cm in maximum transverse dimensions. This is  almost certainly related to metastasis. No hydronephrosis either kidney.  No definite ureteral calculus.     PELVIS: Urinary bladder is quite distended. Recommend close correlation  for symptoms of urgency. Scattered colonic diverticula are noted. No  acute diverticulitis. There are multiple borderline dilated loops of  small bowel. No abrupt caliber change is identified to definitively  suggest small bowel obstruction. This may be on the basis of ileus  although partial or early obstruction could result in a similar  appearance. No free air. No significant free or loculated fluid  collection.     Evaluation of the bones demonstrates disc related degenerative changes  throughout the thoracic and lumbar spine.       Impression:      1. Bilateral pleural effusions (right greater than left).  2. Innumerable pulmonary nodules throughout both lungs almost certainly  related to metastasis.  3. Multiple intermediate attenuation liver lesions likely related to  metastasis.  4. Retroperitoneal adenopathy and a mass within the left adrenal gland  likely related to metastasis.  5. Borderline dilated loops of small bowel without abrupt caliber change  to definitively suggest small bowel obstruction. This may be on the  basis of ileus. However, partial or early obstruction would be difficult  to exclude entirely.     This report was  finalized on 1/27/2024 3:11 AM by Rolly Villagomez MD.       CT Abdomen Pelvis Without Contrast [[id number]] Collected: 01/27/24 0303     Updated: 01/27/24 0313    Narrative:      EXAMINATION: CT chest, abdomen and pelvis without contrast     CLINICAL HISTORY: Known lung cancer with metastasis. Shortness of  breath. Abdominal pain.     COMPARISON: None.     TECHNIQUE: Contiguous 3 mm thick slices were obtained through the chest,  abdomen and pelvis without contrast. Coronal and sagittal reformats were  performed.     FINDINGS:     CHEST:  The thoracic aorta is normal in caliber. No aneurysmal dilatation. Large  right and small left pleural effusions are noted. No pericardial  effusion is appreciated. There are innumerable pulmonary nodules  throughout both lungs consistent with metastatic disease. There is  thickening of the interlobular septa within both lungs. This may be  related to interstitial spread of tumor. However, edema could result in  a similar appearance. There is more confluent density within the left  lower lobe as well as within the left perihilar region extending into  the left upper lobe. This may be infectious in etiology representing  pneumonia. However, malignancy cannot be excluded. There are multiple  prominent mediastinal nodes. A right paratracheal node measures 1.7 x  1.4 cm in AP and transverse dimensions. A precarinal node measures  approximately 2.0 x 1.9 cm in AP and transverse dimensions.     ABDOMEN:  There are multiple intermediate to low-attenuation foci throughout the  liver. The findings are consistent with metastatic disease. This can be  further evaluated with MRI if not characterized previously. The spleen  appears unremarkable. There are multiple retroperitoneal nodes including  within the retrocrural space extending more inferiorly into the  periaortic space. There is a masslike area within the suprarenal area  within the left kidney. This likely involves the left adrenal  gland.  This measures 4.6 x 2.7 cm in maximum transverse dimensions. This is  almost certainly related to metastasis. No hydronephrosis either kidney.  No definite ureteral calculus.     PELVIS: Urinary bladder is quite distended. Recommend close correlation  for symptoms of urgency. Scattered colonic diverticula are noted. No  acute diverticulitis. There are multiple borderline dilated loops of  small bowel. No abrupt caliber change is identified to definitively  suggest small bowel obstruction. This may be on the basis of ileus  although partial or early obstruction could result in a similar  appearance. No free air. No significant free or loculated fluid  collection.     Evaluation of the bones demonstrates disc related degenerative changes  throughout the thoracic and lumbar spine.       Impression:      1. Bilateral pleural effusions (right greater than left).  2. Innumerable pulmonary nodules throughout both lungs almost certainly  related to metastasis.  3. Multiple intermediate attenuation liver lesions likely related to  metastasis.  4. Retroperitoneal adenopathy and a mass within the left adrenal gland  likely related to metastasis.  5. Borderline dilated loops of small bowel without abrupt caliber change  to definitively suggest small bowel obstruction. This may be on the  basis of ileus. However, partial or early obstruction would be difficult  to exclude entirely.     This report was finalized on 1/27/2024 3:11 AM by Rolly Villagomez MD.       XR Chest 1 View [[id number]] Collected: 01/26/24 2302     Updated: 01/26/24 2305    Narrative:      INDICATION: Difficulty breathing     TECHNIQUE: Frontal radiograph of the chest.     COMPARISON: None.       Impression:      FINDINGS/IMPRESSION:   Cardiomegaly. Low inspiratory depth.  Bilateral perihilar/lower lobe opacities concerning for atelectasis.  Difficult to exclude left perihilar tumor. Consider CT.  Small left pleural effusion. No pneumothorax.  No acute  fracture.         This report was finalized on 1/26/2024 11:02 PM by Alex Pallas, DO.             I have personally reviewed the above radiology results.     Last Echocardiogram:  10/2023  ---------------------------------------------------------------------------------------------------------------------    Assessment & Plan      ACUTE HOSPITAL PROBLEMS    -Acute kidney injury secondary to dehydration, on admission  -Normal saline at 150 mL/h  -Monitor renal function with a.m. labs  -BMP and CBC in the a.m.    -F/E/N  Normal saline at 150 mL/h.  Replace electrolytes as needed..  Liquid diet.     CHRONIC MEDICAL PROBLEMS    -History of lung cancer with metastasis  -Patient diagnosed in August 2022  -Patient undergoing treatment currently at the [facility]  -Patient states that currently she is undergoing radiation treatments  -Patient states that she to start a new clinical trial  -LFTs are elevated, CT reveals liver metastasis  -Will monitor LFTs with a.m. labs    -GERD  -Aware of history  -Continue Prilosec on pharmacy reconciliation    -Diverticulitis  -Aware of history    -Obesity  -BMI 35.45  ---------------------------------------------------  DVT Prophylaxis: Patient been anticoagulated with Eliquis  Activity: As tolerated  ---------------------------------------------------  The patient is considered to be a high risk patient due to:      INPATIENT status due to the need for care which can only be reasonably provided in an hospital setting such as aggressive/expedited ancillary services and/or consultation services, the necessity for IV medications, close physician monitoring and/or the possible need for procedures.  In such, I feel patient's risk for adverse outcomes and need for care warrant INPATIENT evaluation and predict the patient's care encounter to likely last beyond 2 midnights.     Code Status: Full code  ---------------------------------------------------  Disposition/Discharge  planning: Consult case management for discharge planning.  ---------------------------------------------------  I have discussed the patient's assessment and plan with attending physician Farhana Pringle DO Carl B Gray, APRN     01/27/24  05:03 EST    Attending Physician: Farhana Cali DO

## 2024-01-27 NOTE — DISCHARGE SUMMARY
"    [facility] HOSPITALISTS DISCHARGE SUMMARY    Patient Identification:  Name:  Savi Villalpando  Age:  68 y.o.  Sex:  female  :  1955  MRN:  [id number]  Visit Number:  [id number]    Date of Admission: 2024  Date of Discharge:  2024     PCP: Camila Soto, DO    DISCHARGE DIAGNOSIS  #Acute hepatic injury, suspect drug induced  #Concern for fulminant hepatic failure  #Elevated INR  #Elevated serum iron  #Acute kidney injury, likey d/t prerenal azotemia vs hepatorenal  #Acute nontraumatic rhabdomyolysis  #NSCLC w/ metastasis on Pralsetinib  #Brain metastases status post CyberKnife  #Chronic hypoxic respiratory failure, on 3 L at baseline  #NSTEMI, likely type 2 d/t above  #Suspected oral candidiasis/Candida esophagitis, POA  #GERD  #Possible UTI, POA    CONSULTS   none    PROCEDURES PERFORMED  CT chest: Bilateral effusions, right greater than left, numerous pulmonary nodules  CT A/P: multiple intermediate hepatic lesions consistent with metastases, RP adenopathy, left adrenal gland mass, dilated loops without evidence of SBO    HOSPITAL COURSE  Patient is a 68 y.o. female presented to [facility] complaining of shortness of breath, dizziness and weakness.  Please see the admitting history and physical for further details.  Patient has a history significant for non-small cell lung cancer with metastases, on chemotherapy and follows with [facility].  Patient was hemodynamically stable upon presentation to the ER.  Patient was initially reported to be alert and orient x 3 and workup noted acute hepatitis with suspicion for drug-induced liver injury.  Also noted was acute kidney injury suspected due to prerenal azotemia due to intravascular volume depletion.    On evaluation this morning, patient appeared disoriented, stated she felt \"cloudy headed \"and was having a hard time thinking and answering questions appropriately.  Of note, she does have known brain metastases and " previously underwent CyberKnife.  Repeat labs this morning noted a significant increase in transaminases.  PT/INR increased from 24/2.1 (was 15/1.3 on 1/24).  Further workup noted an undetectably elevated LDH, elevated CK consistent with nontraumatic rhabdomyolysis, serum iron of 384, TSAT>101 and a ferritin of 59 K.  Acetaminophen level was undetectably low, liver ultrasound was ordered to rule out portal vein thrombus.  HFE (unlikely), autoimmune hep panel (unlikely) also ordered.    It was felt patient patient's acute hepatic injury was drug-induced, either fluconazole or more likely d/t Pralsetinib.  Concern for fulminant hepatic failure and need for tertiary care prompted initiation of transfer.  These concerns were discussed with patient at the bedside with her  via telephone.  She was started on N-acetylcysteine, received IV vitamin K 10 mg x 1 and Solu-Medrol 125 mg x 1.  Case was discussed with Dr. Rolon at , patient was accepted as transfer and report arranged via ground EMS.  Manage he was placed on a disc and sent with the patient in transfer.  An attempt was made to arrange air transport but due to weather conditions, flight team was unavailable.    IV Meds:  NAC  vitamin K  Zosyn  Solu-Medrol 125 mg    VITAL SIGNS:  Temp:  [96.4 °F (35.8 °C)-98.4 °F (36.9 °C)] 96.4 °F (35.8 °C)  Heart Rate:  [64-94] 67  Resp:  [16-20] 20  BP: ()/(56-89) 162/89  SpO2:  [91 %-97 %] 96 %  on  Flow (L/min):  [3] 3;   Device (Oxygen Therapy): nasal cannula    Body mass index is 36.14 kg/m².  Wt Readings from Last 3 Encounters:   01/27/24 98.5 kg (217 lb 2.5 oz)   01/23/24 96.6 kg (213 lb)   12/21/23 100 kg (221 lb)       PHYSICAL EXAM:  Constitutional: eldery female, appears ill, confused, nad  HENT:  Head:  Normocephalic and atraumatic.  Mouth: dry mucous membranes.    Eyes:  Conjunctivae and EOM are normal.  No scleral icterus.    Neck:  Neck supple.  No JVD present.    Cardiovascular:  Normal rate,  regular rhythm and normal heart sounds with no murmur.  Pulmonary/Chest:  No respiratory distress, no wheezes, no crackles, with normal breath sounds and good air movement.  Abdominal:  Soft. No distension and generalized tenderness.   Musculoskeletal:  No tenderness, and no deformity.  No red or swollen joints anywhere.    Neurological: intermittently confused, conversational confusion  Skin:  Skin is warm and dry. No rash noted. No pallor.   Peripheral vascular: no clubbing, no cyanosis, no edema.    DISCHARGE DISPOSITION   Stable for transfer, prognosis guarded    DISCHARGE MEDICATIONS:     Discharge Medications        New Medications        Instructions Start Date   dextrose 5 % solution 200 mL with acetylcysteine 200 MG/ML solution 14,780 mg   150 mg/kg (14,780 mg), Intravenous, Once      dextrose 5 % solution 500 mL with acetylcysteine 200 MG/ML solution 4,920 mg   50 mg/kg (4,920 mg), Intravenous, Once      dextrose 5 % solution 1,000 mL with acetylcysteine 200 MG/ML solution 6,000 mg   6.25 mg/kg/hr (615.625 mg/hr), Intravenous, Continuous      First Mouthwash (Magic Mouthwash) suspension   10 mL, Swish & Spit, Every 6 Hours      methylPREDNISolone sodium succinate 125 MG injection  Commonly known as: SOLU-Medrol   125 mg, Intravenous, Once      oxyCODONE 10 MG tablet  Commonly known as: ROXICODONE   10 mg, Oral, Every 6 Hours PRN      pantoprazole 40 MG EC tablet  Commonly known as: PROTONIX   40 mg, Oral, Every Early Morning   Start Date: January 28, 2024     phytonadione 10 mg in sodium chloride 0.9 % 50 mL IVPB   10 mg, Intravenous, Once             Changes to Medications        Instructions Start Date   ondansetron 8 MG tablet  Commonly known as: ZOFRAN  What changed: Another medication with the same name was added. Make sure you understand how and when to take each.   8 mg, Oral, Every 8 Hours PRN      ondansetron 2 mg/mL injection  Commonly known as: ZOFRAN  What changed: You were already taking a  medication with the same name, and this prescription was added. Make sure you understand how and when to take each.   4 mg, Intravenous, Every 6 Hours PRN             Continue These Medications        Instructions Start Date   Advair -21 MCG/ACT inhaler  Generic drug: fluticasone-salmeterol   2 puffs, Inhalation, 2 Times Daily - RT      Eliquis DVT/PE Starter Pack tablet therapy pack  Generic drug: Apixaban Starter Pack   5 mg, Oral, Every 12 Hours      levocetirizine 5 MG tablet  Commonly known as: Xyzal Allergy 24HR   2.5 mg, Oral, Every Evening      nystatin 100,000 unit/mL suspension  Commonly known as: MYCOSTATIN   500,000 Units, Swish & Swallow, 4 Times Daily, Use for 10 days.             Stop These Medications      calcium carbonate  MG chewable tablet  Commonly known as: TUMS EX     CALCIUM PO     Enoxaparin Sodium 100 MG/ML solution prefilled syringe syringe  Commonly known as: LOVENOX     famotidine 40 MG tablet  Commonly known as: Pepcid     fluconazole 200 MG tablet  Commonly known as: DIFLUCAN     levoFLOXacin 500 MG tablet  Commonly known as: Levaquin     naloxone 4 MG/0.1ML nasal spray  Commonly known as: NARCAN     omeprazole 20 MG capsule  Commonly known as: priLOSEC     Pralsetinib 100 MG capsule     prochlorperazine 10 MG tablet  Commonly known as: COMPAZINE     promethazine 25 MG tablet  Commonly known as: PHENERGAN              Diet Instructions       Diet: Nothing By Mouth      Discharge Diet: Nothing By Mouth           Follow-up Information       Camila Soto DO .    Specialties: Family Medicine, Hospitalist  Contact information:  [address]  [facility] [address]  [phone]                              TEST  RESULTS PENDING AT DISCHARGE  Pending Labs       Order Current Status    CHANDLER Comprehensive Panel In process    Anti-Smooth Muscle Antibody Titer In process    Autoimmune Liver Disease Profile (RDL) In process    Hemochromatosis Mutation In process             CODE  STATUS  Code Status and Medical Interventions:   Ordered at: 01/27/24 0358     Code Status (Patient has no pulse and is not breathing):    CPR (Attempt to Resuscitate)     Medical Interventions (Patient has pulse or is breathing):    Full Support       Shaun Finley DO  [facility]  01/27/24  17:50 EST    Please note that this discharge summary required more than 30 minutes to complete.

## 2024-01-27 NOTE — PROGRESS NOTES
Patient seen and evaluated at bedside.  Labs reviewed, additional studies added to a.m. workup.  It appears patient is in fulminant hepatic failure from either fluconazole (had been on it for oral candidiasis/Candida esophagitis) or Pralsetinib-grade 4 hepatic injury.  While she answer some orientations correctly, she is obviously disoriented at times/encephalopathic. AST/ALT significantly increased from yesterday.  INR 2.1 (1.3 on 1/24).  LDH undetectably elevated, ferritin 59k, TSAT >101. Liver US w/ dopplers to r/o PVT ordered but not yet performed.    I did send HFE though unlikely.  Autoimmune hep workup sent also unlikely.  Given fulminant hepatic failure, start N-acetylcysteine non acetaminophen protocol, IV corticosteroids.  Will EK to speak to hematology/oncology, anticipate the need to transfer to tertiary center. In the meantime, move to CCU    Electronically signed by Shaun Finley DO, 01/27/24, 2:08 PM EST.      Discussed with  MDs, discussed with Dr. Rolon.  Patient was accepted for transfer.  Expected bed available to be soon, will arrange for air transport if available.

## 2024-01-27 NOTE — PLAN OF CARE
Goal Outcome Evaluation:           Progress: declining  Outcome Evaluation: Patient resting in bed at this time. A&O x4. VSS. 3L NC. Patient being transferred to [facility]. Report called and given to AKASH Fisher.

## 2024-01-27 NOTE — ED PROVIDER NOTES
Subjective   History of Present Illness  68-year-old female with past medical history of stage IV lung cancer currently undergoing treatment presents to the ED with general weakness, nausea, dizziness, shortness of breath after starting a new medication yesterday.  She states she was told that she may have the side effects.  She is on home O2.  She has pain which she states is the same pain associated with her cancer in her left upper abdomen, left chest region which she states has not significantly changed    History provided by:  Patient      Review of Systems    Past Medical History:   Diagnosis Date    Allergic 1955    Allergies     Anxiety 1970    Arthritis 2010    Asthma 1956    Cholelithiasis 1976    Depression 1979    Post partum    Diverticulitis     Diverticulitis of colon Nov. 2022    Diverticulosis 2022    Elevated liver enzymes     GERD (gastroesophageal reflux disease) 2023    HL (hearing loss) 2011    Lung cancer     stage 4    Obesity 1997    PONV (postoperative nausea and vomiting) 1990?    Ever since unless given anti-nausea before.    Rectal bleeding Nov. 2022    Rib fracture     Urinary tract infection 2023       Allergies   Allergen Reactions    Fluad Quadrivalent [Influenza Vac A&B Sa Adj Quad] Anaphylaxis    Metronidazole Cough and Hives    Multi Complete-Iron [Anti-Oxidant] Anaphylaxis     Cough uncontrollably, all fragrances    Other Anaphylaxis     Cough uncontrollably, all fragrances    Sertraline GI Intolerance, Nausea Only and Other (See Comments)     Severe diarrhea  Severe diarrhea  Severe diarrhea  Severe diarrhea      Shellfish-Derived Products Anaphylaxis    Sulfa Antibiotics Anaphylaxis    Macrobid [Nitrofurantoin] Other (See Comments)     Vision changes    Morphine Nausea And Vomiting and Dizziness    Sulfate Unknown - Low Severity       Past Surgical History:   Procedure Laterality Date    BILATERAL BREAST REDUCTION      3 lb removal    BRAIN SURGERY N/A     Gamma knife surgery  to remove brain mets    BREAST BIOPSY  1990    It was a fibrous mass about golf ball sized    BREAST CYST ASPIRATION  1990    Noted above    BREAST EXCISIONAL BIOPSY Right 1989    benign    BREAST SURGERY  2021    CERVICAL FUSION      C567 partial 8    CHOLECYSTECTOMY      COLONOSCOPY  2023 July    COSMETIC SURGERY  2021    EYE SURGERY  2000    HAND SURGERY      x3    HYSTERECTOMY Bilateral 1999    total-benign    LYMPH NODE DISSECTION      back of neck    REDUCTION MAMMAPLASTY Bilateral 2019    TRIGGER FINGER RELEASE         Family History   Problem Relation Age of Onset    Arthritis Mother     Mental illness Mother     Heart disease Father     Alcohol abuse Sister     Drug abuse Sister     Miscarriages / Stillbirths Daughter         Miscarriage    Asthma Maternal Grandmother     Hearing loss Maternal Grandmother     Cancer Maternal Uncle     Breast cancer Neg Hx        Social History     Socioeconomic History    Marital status:    Tobacco Use    Smoking status: Never    Smokeless tobacco: Never    Tobacco comments:     Both parents smoked   Vaping Use    Vaping Use: Never used   Substance and Sexual Activity    Alcohol use: Not Currently     Alcohol/week: 3.0 standard drinks of alcohol     Types: 3 Glasses of wine per week     Comment: This is for pain management.    Drug use: Never    Sexual activity: Not Currently     Partners: Male     Birth control/protection: Post-menopausal, Hysterectomy           Objective   Physical Exam  Constitutional:       General: She is not in acute distress.     Appearance: She is ill-appearing.   HENT:      Head: Normocephalic and atraumatic.   Eyes:      Conjunctiva/sclera: Conjunctivae normal.   Cardiovascular:      Rate and Rhythm: Normal rate and regular rhythm.   Pulmonary:      Effort: Pulmonary effort is normal.      Breath sounds: Normal breath sounds.   Abdominal:      General: Abdomen is flat.      Palpations: Abdomen is soft.      Tenderness: There is no  abdominal tenderness.   Musculoskeletal:      Right lower leg: No tenderness. No edema.      Left lower leg: No tenderness. No edema.   Neurological:      General: No focal deficit present.      Mental Status: She is alert and oriented to person, place, and time. Mental status is at baseline.         Procedures           ED Course  ED Course as of 01/27/24 0355   Fri Jan 26, 2024   2146 EKG obtained on arrival independently interpreted as sinus rhythm rate 93 with nonspecific T wave abnormalities and low voltage QRS  Electronically signed by Hayden Lerma MD, 01/26/24, 9:46 PM EST.   [AS]      ED Course User Index  [AS] Hayden Lerma MD                                             Medical Decision Making  68-year-old female history of lung cancer presents to the ED with nausea, generalized weakness, dizziness, diarrhea, shortness of breath after starting new medication recently.  EKG with no acute findings.  Labs showed acute kidney injury as well as new increase in LFTs.  CTs were obtained and showed worsening of known metastasis, ileus more likely than small bowel obstruction.  Suspect LFT increase is due to metastasis versus new medication.  ORA likely due to diarrhea and decreased oral intake due to mucositis.  Patient given fluids in the ED and will admit for ORA.  Patient discussed with the hospitalist who will admit the patient.    Problems Addressed:  Acute kidney injury: complicated acute illness or injury  Dizziness: acute illness or injury  Elevated LFTs: acute illness or injury  Elevated troponin: chronic illness or injury  Generalized weakness: acute illness or injury  History of lung cancer: chronic illness or injury  Nausea: acute illness or injury    Amount and/or Complexity of Data Reviewed  Labs: ordered.  Radiology: ordered.  ECG/medicine tests: ordered and independent interpretation performed.    Risk  Prescription drug management.  Decision regarding hospitalization.        Final diagnoses:    History of lung cancer   Nausea   Dizziness   Generalized weakness   Acute kidney injury   Elevated LFTs   Elevated troponin       ED Disposition  ED Disposition       ED Disposition   Decision to Admit    Condition   --    Comment   --               No follow-up provider specified.       Medication List      No changes were made to your prescriptions during this visit.            Hayden Lerma MD  01/27/24 0355

## 2024-01-27 NOTE — PLAN OF CARE
Goal Outcome Evaluation:  Plan of Care Reviewed With: patient        Progress: no change  Outcome Evaluation: Patient arrived to unit from ER this shift. Patient A&O x4. VSS on 3L nc. Patient c/o pain and nausea, see MAR. Patient has no other complaints at this time. Will continue plan of care.

## 2024-01-27 NOTE — ED NOTES
"Pt in wheel chair and pulled to the bedside. Pt asked if she can stand to get over in ED stretcher. Pt states, \"I can't with all this stuff.\" Pt assisted with placing stuff on ED stretcher. Pt then stood and fell to the floor on her bottom softly on my foot. Pt did not hit her head or any other limbs. Pt assisted with ED tech Charmaine back up to ED stretcher. Pt was a little combatitive and upset on assisting her and was informed that it will not be tolerated to hit staff. Pt became cooperative and was able to stand and get back into the ED stretcher.   "

## 2024-01-28 LAB
QT INTERVAL: 376 MS
QTC INTERVAL: 467 MS

## 2024-01-28 NOTE — NURSING NOTE
Patient is being transported by [facility] ALS unit via stretcher to [facility].  at bedside and he took her phone, earrings, blanket, and other personal belongings.

## 2024-01-29 LAB
CENTROMERE B AB SER-ACNC: <0.2 AI (ref 0–0.9)
CHROMATIN AB SERPL-ACNC: <0.2 AI (ref 0–0.9)
DSDNA AB SER-ACNC: <1 IU/ML (ref 0–9)
ENA JO1 AB SER-ACNC: <0.2 AI (ref 0–0.9)
ENA RNP AB SER-ACNC: <0.2 AI (ref 0–0.9)
ENA SCL70 AB SER-ACNC: <0.2 AI (ref 0–0.9)
ENA SM AB SER-ACNC: <0.2 AI (ref 0–0.9)
ENA SS-A AB SER-ACNC: <0.2 AI (ref 0–0.9)
ENA SS-B AB SER-ACNC: <0.2 AI (ref 0–0.9)
Lab: NORMAL
SMA IGG SER-ACNC: 2 UNITS (ref 0–19)

## 2024-02-03 LAB
ANA HOMOGEN TITR SER: ABNORMAL {TITER}
ANA SER QL IF: POSITIVE
ANA SPECKLED TITR SER: ABNORMAL {TITER}
ATYP ANCA SER QL IF: NEGATIVE
CHROMATIN IGG SERPL-ACNC: <20 UNITS
LABORATORY COMMENT REPORT: ABNORMAL
LKM-1 AB SER-ACNC: <20 UNITS
MITOCHONDRIA AB SER-ACNC: <20 UNITS
MITOCHONDRIA AB TITR SER IF: ABNORMAL {TITER}
SMOOTH MUSCLE AB SER QL IF: ABNORMAL
SOLUBLE LIVER AB SER-ACNC: <20 UNITS

## 2024-02-05 LAB
HFE GENE MUT ANL BLD/T: NORMAL
IMP & REVIEW OF LAB RESULTS: NORMAL

## 2024-02-07 ENCOUNTER — TELEPHONE (OUTPATIENT)
Dept: FAMILY MEDICINE CLINIC | Facility: CLINIC | Age: 69
End: 2024-02-07
Payer: MEDICARE

## 2024-02-07 NOTE — TELEPHONE ENCOUNTER
Caller: Savi Villalpando    Relationship: Self    Best call back number: [phone]     What is the best time to reach you: ANY    Who are you requesting to speak with (clinical staff, provider,  specific staff member): DOCTOR        What was the call regarding: REQUEST A CALL BACK, WANTS TO TALK WITH THE DOCTOR, CURRENTLY HOSPITALIZED    Is it okay if the provider responds through MyChart: NO

## 2024-03-05 DIAGNOSIS — J30.2 SEASONAL ALLERGIC RHINITIS, UNSPECIFIED TRIGGER: ICD-10-CM

## 2024-03-05 DIAGNOSIS — R05.9 COUGH, UNSPECIFIED TYPE: ICD-10-CM

## 2024-03-05 RX ORDER — LEVOCETIRIZINE DIHYDROCHLORIDE 5 MG/1
2.5 TABLET, FILM COATED ORAL EVERY EVENING
Qty: 45 TABLET | Refills: 0 | OUTPATIENT
Start: 2024-03-05